# Patient Record
Sex: MALE | NOT HISPANIC OR LATINO | URBAN - METROPOLITAN AREA
[De-identification: names, ages, dates, MRNs, and addresses within clinical notes are randomized per-mention and may not be internally consistent; named-entity substitution may affect disease eponyms.]

---

## 2021-01-01 ENCOUNTER — INPATIENT (INPATIENT)
Facility: HOSPITAL | Age: 86
LOS: 13 days | DRG: 870 | End: 2021-12-15
Attending: INTERNAL MEDICINE | Admitting: INTERNAL MEDICINE
Payer: MEDICARE

## 2021-01-01 VITALS
DIASTOLIC BLOOD PRESSURE: 82 MMHG | SYSTOLIC BLOOD PRESSURE: 169 MMHG | RESPIRATION RATE: 26 BRPM | TEMPERATURE: 95 F | WEIGHT: 186.51 LBS | HEART RATE: 140 BPM | OXYGEN SATURATION: 83 %

## 2021-01-01 VITALS
HEART RATE: 98 BPM | RESPIRATION RATE: 17 BRPM | OXYGEN SATURATION: 76 % | DIASTOLIC BLOOD PRESSURE: 38 MMHG | SYSTOLIC BLOOD PRESSURE: 61 MMHG

## 2021-01-01 DIAGNOSIS — U07.1 COVID-19: ICD-10-CM

## 2021-01-01 DIAGNOSIS — N17.9 ACUTE KIDNEY FAILURE, UNSPECIFIED: ICD-10-CM

## 2021-01-01 DIAGNOSIS — Z71.89 OTHER SPECIFIED COUNSELING: ICD-10-CM

## 2021-01-01 DIAGNOSIS — J96.01 ACUTE RESPIRATORY FAILURE WITH HYPOXIA: ICD-10-CM

## 2021-01-01 DIAGNOSIS — I96 GANGRENE, NOT ELSEWHERE CLASSIFIED: ICD-10-CM

## 2021-01-01 DIAGNOSIS — R06.00 DYSPNEA, UNSPECIFIED: ICD-10-CM

## 2021-01-01 DIAGNOSIS — R53.2 FUNCTIONAL QUADRIPLEGIA: ICD-10-CM

## 2021-01-01 DIAGNOSIS — Z51.5 ENCOUNTER FOR PALLIATIVE CARE: ICD-10-CM

## 2021-01-01 LAB
A1C WITH ESTIMATED AVERAGE GLUCOSE RESULT: 5.8 % — HIGH (ref 4–5.6)
ALBUMIN SERPL ELPH-MCNC: 2.3 G/DL — LOW (ref 3.3–5)
ALBUMIN SERPL ELPH-MCNC: 2.4 G/DL — LOW (ref 3.3–5)
ALBUMIN SERPL ELPH-MCNC: 2.4 G/DL — LOW (ref 3.3–5)
ALBUMIN SERPL ELPH-MCNC: 2.5 G/DL — LOW (ref 3.3–5)
ALBUMIN SERPL ELPH-MCNC: 2.6 G/DL — LOW (ref 3.3–5)
ALBUMIN SERPL ELPH-MCNC: 2.7 G/DL — LOW (ref 3.3–5)
ALBUMIN SERPL ELPH-MCNC: 2.8 G/DL — LOW (ref 3.3–5)
ALBUMIN SERPL ELPH-MCNC: 2.9 G/DL — LOW (ref 3.3–5)
ALBUMIN SERPL ELPH-MCNC: 3.2 G/DL — LOW (ref 3.3–5)
ALP SERPL-CCNC: 100 U/L — SIGNIFICANT CHANGE UP (ref 40–120)
ALP SERPL-CCNC: 101 U/L — SIGNIFICANT CHANGE UP (ref 40–120)
ALP SERPL-CCNC: 109 U/L — SIGNIFICANT CHANGE UP (ref 40–120)
ALP SERPL-CCNC: 111 U/L — SIGNIFICANT CHANGE UP (ref 40–120)
ALP SERPL-CCNC: 115 U/L — SIGNIFICANT CHANGE UP (ref 40–120)
ALP SERPL-CCNC: 124 U/L — HIGH (ref 40–120)
ALP SERPL-CCNC: 127 U/L — HIGH (ref 40–120)
ALP SERPL-CCNC: 130 U/L — HIGH (ref 40–120)
ALP SERPL-CCNC: 157 U/L — HIGH (ref 40–120)
ALP SERPL-CCNC: 88 U/L — SIGNIFICANT CHANGE UP (ref 40–120)
ALP SERPL-CCNC: 93 U/L — SIGNIFICANT CHANGE UP (ref 40–120)
ALP SERPL-CCNC: 95 U/L — SIGNIFICANT CHANGE UP (ref 40–120)
ALP SERPL-CCNC: 96 U/L — SIGNIFICANT CHANGE UP (ref 40–120)
ALP SERPL-CCNC: 98 U/L — SIGNIFICANT CHANGE UP (ref 40–120)
ALT FLD-CCNC: 105 U/L — HIGH (ref 10–45)
ALT FLD-CCNC: 118 U/L — HIGH (ref 10–45)
ALT FLD-CCNC: 120 U/L — HIGH (ref 10–45)
ALT FLD-CCNC: 141 U/L — HIGH (ref 10–45)
ALT FLD-CCNC: 151 U/L — HIGH (ref 10–45)
ALT FLD-CCNC: 188 U/L — HIGH (ref 10–45)
ALT FLD-CCNC: 211 U/L — HIGH (ref 10–45)
ALT FLD-CCNC: 28 U/L — SIGNIFICANT CHANGE UP (ref 10–45)
ALT FLD-CCNC: 295 U/L — HIGH (ref 10–45)
ALT FLD-CCNC: 380 U/L — HIGH (ref 10–45)
ALT FLD-CCNC: 385 U/L — HIGH (ref 10–45)
ALT FLD-CCNC: 395 U/L — HIGH (ref 10–45)
ALT FLD-CCNC: 489 U/L — HIGH (ref 10–45)
ALT FLD-CCNC: 95 U/L — HIGH (ref 10–45)
ANION GAP SERPL CALC-SCNC: 10 MMOL/L — SIGNIFICANT CHANGE UP (ref 5–17)
ANION GAP SERPL CALC-SCNC: 13 MMOL/L — SIGNIFICANT CHANGE UP (ref 5–17)
ANION GAP SERPL CALC-SCNC: 14 MMOL/L — SIGNIFICANT CHANGE UP (ref 5–17)
ANION GAP SERPL CALC-SCNC: 14 MMOL/L — SIGNIFICANT CHANGE UP (ref 5–17)
ANION GAP SERPL CALC-SCNC: 15 MMOL/L — SIGNIFICANT CHANGE UP (ref 5–17)
ANION GAP SERPL CALC-SCNC: 15 MMOL/L — SIGNIFICANT CHANGE UP (ref 5–17)
ANION GAP SERPL CALC-SCNC: 23 MMOL/L — HIGH (ref 5–17)
ANION GAP SERPL CALC-SCNC: 6 MMOL/L — SIGNIFICANT CHANGE UP (ref 5–17)
ANION GAP SERPL CALC-SCNC: 7 MMOL/L — SIGNIFICANT CHANGE UP (ref 5–17)
ANION GAP SERPL CALC-SCNC: 7 MMOL/L — SIGNIFICANT CHANGE UP (ref 5–17)
ANION GAP SERPL CALC-SCNC: 8 MMOL/L — SIGNIFICANT CHANGE UP (ref 5–17)
ANION GAP SERPL CALC-SCNC: 9 MMOL/L — SIGNIFICANT CHANGE UP (ref 5–17)
ANISOCYTOSIS BLD QL: SLIGHT — SIGNIFICANT CHANGE UP
APAP SERPL-MCNC: <5 UG/ML — LOW (ref 10–30)
APPEARANCE UR: CLEAR — SIGNIFICANT CHANGE UP
APPEARANCE UR: CLEAR — SIGNIFICANT CHANGE UP
APTT BLD: 110 SEC — HIGH (ref 27.5–35.5)
APTT BLD: 25.3 SEC — LOW (ref 27.5–35.5)
APTT BLD: 28 SEC — SIGNIFICANT CHANGE UP (ref 27.5–35.5)
APTT BLD: 62 SEC — HIGH (ref 27.5–35.5)
APTT BLD: 79.1 SEC — HIGH (ref 27.5–35.5)
APTT BLD: 84.2 SEC — HIGH (ref 27.5–35.5)
APTT BLD: 85.2 SEC — HIGH (ref 27.5–35.5)
APTT BLD: 88.5 SEC — HIGH (ref 27.5–35.5)
AST SERPL-CCNC: 103 U/L — HIGH (ref 10–40)
AST SERPL-CCNC: 107 U/L — HIGH (ref 10–40)
AST SERPL-CCNC: 124 U/L — HIGH (ref 10–40)
AST SERPL-CCNC: 125 U/L — HIGH (ref 10–40)
AST SERPL-CCNC: 128 U/L — HIGH (ref 10–40)
AST SERPL-CCNC: 174 U/L — HIGH (ref 10–40)
AST SERPL-CCNC: 180 U/L — HIGH (ref 10–40)
AST SERPL-CCNC: 220 U/L — HIGH (ref 10–40)
AST SERPL-CCNC: 553 U/L — HIGH (ref 10–40)
AST SERPL-CCNC: 769 U/L — HIGH (ref 10–40)
AST SERPL-CCNC: 85 U/L — HIGH (ref 10–40)
AST SERPL-CCNC: 89 U/L — HIGH (ref 10–40)
AST SERPL-CCNC: 93 U/L — HIGH (ref 10–40)
AST SERPL-CCNC: 94 U/L — HIGH (ref 10–40)
B-OH-BUTYR SERPL-SCNC: 0.3 MMOL/L — SIGNIFICANT CHANGE UP
BACTERIA # UR AUTO: ABNORMAL /HPF
BACTERIA # UR AUTO: PRESENT /HPF
BASE EXCESS BLDA CALC-SCNC: -13.6 MMOL/L — LOW (ref -2–3)
BASE EXCESS BLDA CALC-SCNC: -2.7 MMOL/L — LOW (ref -2–3)
BASE EXCESS BLDA CALC-SCNC: -3.7 MMOL/L — LOW (ref -2–3)
BASE EXCESS BLDA CALC-SCNC: -5.7 MMOL/L — LOW (ref -2–3)
BASE EXCESS BLDA CALC-SCNC: -6.7 MMOL/L — LOW (ref -2–3)
BASE EXCESS BLDA CALC-SCNC: -7.6 MMOL/L — LOW (ref -2–3)
BASE EXCESS BLDA CALC-SCNC: -7.9 MMOL/L — LOW (ref -2–3)
BASE EXCESS BLDA CALC-SCNC: -8 MMOL/L — LOW (ref -2–3)
BASE EXCESS BLDA CALC-SCNC: -8.1 MMOL/L — LOW (ref -2–3)
BASE EXCESS BLDA CALC-SCNC: -8.2 MMOL/L — LOW (ref -2–3)
BASE EXCESS BLDA CALC-SCNC: -8.3 MMOL/L — LOW (ref -2–3)
BASE EXCESS BLDA CALC-SCNC: -8.4 MMOL/L — LOW (ref -2–3)
BASE EXCESS BLDA CALC-SCNC: -8.6 MMOL/L — LOW (ref -2–3)
BASE EXCESS BLDA CALC-SCNC: -8.7 MMOL/L — LOW (ref -2–3)
BASE EXCESS BLDA CALC-SCNC: 3 MMOL/L — SIGNIFICANT CHANGE UP (ref -2–3)
BASE EXCESS BLDA CALC-SCNC: 5.9 MMOL/L — HIGH (ref -2–3)
BASE EXCESS BLDV CALC-SCNC: -7.4 MMOL/L — LOW (ref -2–3)
BASOPHILS # BLD AUTO: 0 K/UL — SIGNIFICANT CHANGE UP (ref 0–0.2)
BASOPHILS # BLD AUTO: 0.01 K/UL — SIGNIFICANT CHANGE UP (ref 0–0.2)
BASOPHILS # BLD AUTO: 0.02 K/UL — SIGNIFICANT CHANGE UP (ref 0–0.2)
BASOPHILS # BLD AUTO: 0.03 K/UL — SIGNIFICANT CHANGE UP (ref 0–0.2)
BASOPHILS # BLD AUTO: 0.03 K/UL — SIGNIFICANT CHANGE UP (ref 0–0.2)
BASOPHILS # BLD AUTO: 0.07 K/UL — SIGNIFICANT CHANGE UP (ref 0–0.2)
BASOPHILS # BLD AUTO: 0.07 K/UL — SIGNIFICANT CHANGE UP (ref 0–0.2)
BASOPHILS # BLD AUTO: 0.15 K/UL — SIGNIFICANT CHANGE UP (ref 0–0.2)
BASOPHILS NFR BLD AUTO: 0 % — SIGNIFICANT CHANGE UP (ref 0–2)
BASOPHILS NFR BLD AUTO: 0.1 % — SIGNIFICANT CHANGE UP (ref 0–2)
BASOPHILS NFR BLD AUTO: 0.2 % — SIGNIFICANT CHANGE UP (ref 0–2)
BASOPHILS NFR BLD AUTO: 0.2 % — SIGNIFICANT CHANGE UP (ref 0–2)
BASOPHILS NFR BLD AUTO: 0.3 % — SIGNIFICANT CHANGE UP (ref 0–2)
BASOPHILS NFR BLD AUTO: 0.4 % — SIGNIFICANT CHANGE UP (ref 0–2)
BASOPHILS NFR BLD AUTO: 0.5 % — SIGNIFICANT CHANGE UP (ref 0–2)
BASOPHILS NFR BLD AUTO: 0.9 % — SIGNIFICANT CHANGE UP (ref 0–2)
BILIRUB DIRECT SERPL-MCNC: 1.5 MG/DL — HIGH (ref 0–0.3)
BILIRUB INDIRECT FLD-MCNC: 0.2 MG/DL — SIGNIFICANT CHANGE UP (ref 0.2–1)
BILIRUB SERPL-MCNC: 0.4 MG/DL — SIGNIFICANT CHANGE UP (ref 0.2–1.2)
BILIRUB SERPL-MCNC: 0.4 MG/DL — SIGNIFICANT CHANGE UP (ref 0.2–1.2)
BILIRUB SERPL-MCNC: 0.5 MG/DL — SIGNIFICANT CHANGE UP (ref 0.2–1.2)
BILIRUB SERPL-MCNC: 0.6 MG/DL — SIGNIFICANT CHANGE UP (ref 0.2–1.2)
BILIRUB SERPL-MCNC: 0.7 MG/DL — SIGNIFICANT CHANGE UP (ref 0.2–1.2)
BILIRUB SERPL-MCNC: 0.8 MG/DL — SIGNIFICANT CHANGE UP (ref 0.2–1.2)
BILIRUB SERPL-MCNC: 0.9 MG/DL — SIGNIFICANT CHANGE UP (ref 0.2–1.2)
BILIRUB SERPL-MCNC: 1 MG/DL — SIGNIFICANT CHANGE UP (ref 0.2–1.2)
BILIRUB SERPL-MCNC: 1.7 MG/DL — HIGH (ref 0.2–1.2)
BILIRUB UR-MCNC: NEGATIVE — SIGNIFICANT CHANGE UP
BILIRUB UR-MCNC: NEGATIVE — SIGNIFICANT CHANGE UP
BUN SERPL-MCNC: 17 MG/DL — SIGNIFICANT CHANGE UP (ref 7–23)
BUN SERPL-MCNC: 27 MG/DL — HIGH (ref 7–23)
BUN SERPL-MCNC: 35 MG/DL — HIGH (ref 7–23)
BUN SERPL-MCNC: 39 MG/DL — HIGH (ref 7–23)
BUN SERPL-MCNC: 39 MG/DL — HIGH (ref 7–23)
BUN SERPL-MCNC: 41 MG/DL — HIGH (ref 7–23)
BUN SERPL-MCNC: 42 MG/DL — HIGH (ref 7–23)
BUN SERPL-MCNC: 42 MG/DL — HIGH (ref 7–23)
BUN SERPL-MCNC: 44 MG/DL — HIGH (ref 7–23)
BUN SERPL-MCNC: 45 MG/DL — HIGH (ref 7–23)
BUN SERPL-MCNC: 49 MG/DL — HIGH (ref 7–23)
BUN SERPL-MCNC: 51 MG/DL — HIGH (ref 7–23)
BUN SERPL-MCNC: 52 MG/DL — HIGH (ref 7–23)
BUN SERPL-MCNC: 53 MG/DL — HIGH (ref 7–23)
BUN SERPL-MCNC: 55 MG/DL — HIGH (ref 7–23)
BUN SERPL-MCNC: 56 MG/DL — HIGH (ref 7–23)
BUN SERPL-MCNC: 56 MG/DL — HIGH (ref 7–23)
BUN SERPL-MCNC: 57 MG/DL — HIGH (ref 7–23)
BUN SERPL-MCNC: 57 MG/DL — HIGH (ref 7–23)
BUN SERPL-MCNC: 60 MG/DL — HIGH (ref 7–23)
BURR CELLS BLD QL SMEAR: PRESENT — SIGNIFICANT CHANGE UP
BURR CELLS BLD QL SMEAR: PRESENT — SIGNIFICANT CHANGE UP
CA-I SERPL-SCNC: 1.11 MMOL/L — LOW (ref 1.15–1.33)
CALCIUM SERPL-MCNC: 7.6 MG/DL — LOW (ref 8.4–10.5)
CALCIUM SERPL-MCNC: 7.6 MG/DL — LOW (ref 8.4–10.5)
CALCIUM SERPL-MCNC: 7.7 MG/DL — LOW (ref 8.4–10.5)
CALCIUM SERPL-MCNC: 7.7 MG/DL — LOW (ref 8.4–10.5)
CALCIUM SERPL-MCNC: 8 MG/DL — LOW (ref 8.4–10.5)
CALCIUM SERPL-MCNC: 8.1 MG/DL — LOW (ref 8.4–10.5)
CALCIUM SERPL-MCNC: 8.2 MG/DL — LOW (ref 8.4–10.5)
CALCIUM SERPL-MCNC: 8.3 MG/DL — LOW (ref 8.4–10.5)
CALCIUM SERPL-MCNC: 8.3 MG/DL — LOW (ref 8.4–10.5)
CALCIUM SERPL-MCNC: 8.4 MG/DL — SIGNIFICANT CHANGE UP (ref 8.4–10.5)
CALCIUM SERPL-MCNC: 8.4 MG/DL — SIGNIFICANT CHANGE UP (ref 8.4–10.5)
CALCIUM SERPL-MCNC: 8.5 MG/DL — SIGNIFICANT CHANGE UP (ref 8.4–10.5)
CALCIUM SERPL-MCNC: 8.6 MG/DL — SIGNIFICANT CHANGE UP (ref 8.4–10.5)
CALCIUM SERPL-MCNC: 8.8 MG/DL — SIGNIFICANT CHANGE UP (ref 8.4–10.5)
CALCIUM SERPL-MCNC: 8.8 MG/DL — SIGNIFICANT CHANGE UP (ref 8.4–10.5)
CALCIUM SERPL-MCNC: 9 MG/DL — SIGNIFICANT CHANGE UP (ref 8.4–10.5)
CHLORIDE SERPL-SCNC: 104 MMOL/L — SIGNIFICANT CHANGE UP (ref 96–108)
CHLORIDE SERPL-SCNC: 104 MMOL/L — SIGNIFICANT CHANGE UP (ref 96–108)
CHLORIDE SERPL-SCNC: 105 MMOL/L — SIGNIFICANT CHANGE UP (ref 96–108)
CHLORIDE SERPL-SCNC: 108 MMOL/L — SIGNIFICANT CHANGE UP (ref 96–108)
CHLORIDE SERPL-SCNC: 108 MMOL/L — SIGNIFICANT CHANGE UP (ref 96–108)
CHLORIDE SERPL-SCNC: 109 MMOL/L — HIGH (ref 96–108)
CHLORIDE SERPL-SCNC: 110 MMOL/L — HIGH (ref 96–108)
CHLORIDE SERPL-SCNC: 110 MMOL/L — HIGH (ref 96–108)
CHLORIDE SERPL-SCNC: 112 MMOL/L — HIGH (ref 96–108)
CHLORIDE SERPL-SCNC: 112 MMOL/L — HIGH (ref 96–108)
CHLORIDE SERPL-SCNC: 113 MMOL/L — HIGH (ref 96–108)
CHLORIDE SERPL-SCNC: 114 MMOL/L — HIGH (ref 96–108)
CHLORIDE SERPL-SCNC: 115 MMOL/L — HIGH (ref 96–108)
CHLORIDE SERPL-SCNC: 118 MMOL/L — HIGH (ref 96–108)
CHLORIDE SERPL-SCNC: 119 MMOL/L — HIGH (ref 96–108)
CHLORIDE SERPL-SCNC: 96 MMOL/L — SIGNIFICANT CHANGE UP (ref 96–108)
CK MB CFR SERPL CALC: 5.9 NG/ML — SIGNIFICANT CHANGE UP (ref 0–6.7)
CK MB CFR SERPL CALC: 7 NG/ML — HIGH (ref 0–6.7)
CK SERPL-CCNC: 1018 U/L — HIGH (ref 30–200)
CK SERPL-CCNC: 1227 U/L — HIGH (ref 30–200)
CO2 BLDA-SCNC: 13 MMOL/L — LOW (ref 19–24)
CO2 BLDA-SCNC: 20 MMOL/L — SIGNIFICANT CHANGE UP (ref 19–24)
CO2 BLDA-SCNC: 21 MMOL/L — SIGNIFICANT CHANGE UP (ref 19–24)
CO2 BLDA-SCNC: 21 MMOL/L — SIGNIFICANT CHANGE UP (ref 19–24)
CO2 BLDA-SCNC: 22 MMOL/L — SIGNIFICANT CHANGE UP (ref 19–24)
CO2 BLDA-SCNC: 24 MMOL/L — SIGNIFICANT CHANGE UP (ref 19–24)
CO2 BLDA-SCNC: 24 MMOL/L — SIGNIFICANT CHANGE UP (ref 19–24)
CO2 BLDA-SCNC: 28 MMOL/L — HIGH (ref 19–24)
CO2 BLDA-SCNC: 29 MMOL/L — HIGH (ref 19–24)
CO2 BLDV-SCNC: 22.4 MMOL/L — SIGNIFICANT CHANGE UP (ref 22–26)
CO2 SERPL-SCNC: 18 MMOL/L — LOW (ref 22–31)
CO2 SERPL-SCNC: 19 MMOL/L — LOW (ref 22–31)
CO2 SERPL-SCNC: 21 MMOL/L — LOW (ref 22–31)
CO2 SERPL-SCNC: 23 MMOL/L — SIGNIFICANT CHANGE UP (ref 22–31)
CO2 SERPL-SCNC: 24 MMOL/L — SIGNIFICANT CHANGE UP (ref 22–31)
CO2 SERPL-SCNC: 25 MMOL/L — SIGNIFICANT CHANGE UP (ref 22–31)
CO2 SERPL-SCNC: 26 MMOL/L — SIGNIFICANT CHANGE UP (ref 22–31)
CO2 SERPL-SCNC: 27 MMOL/L — SIGNIFICANT CHANGE UP (ref 22–31)
CO2 SERPL-SCNC: 28 MMOL/L — SIGNIFICANT CHANGE UP (ref 22–31)
COLOR SPEC: YELLOW — SIGNIFICANT CHANGE UP
COLOR SPEC: YELLOW — SIGNIFICANT CHANGE UP
COVID-19 NUCLEOCAPSID GAM AB INTERP: POSITIVE
COVID-19 NUCLEOCAPSID TOTAL GAM ANTIBODY RESULT: 31.9 INDEX — HIGH
COVID-19 SPIKE DOMAIN AB INTERP: POSITIVE
COVID-19 SPIKE DOMAIN ANTIBODY RESULT: >250 U/ML — HIGH
CREAT SERPL-MCNC: 0.93 MG/DL — SIGNIFICANT CHANGE UP (ref 0.5–1.3)
CREAT SERPL-MCNC: 0.99 MG/DL — SIGNIFICANT CHANGE UP (ref 0.5–1.3)
CREAT SERPL-MCNC: 0.99 MG/DL — SIGNIFICANT CHANGE UP (ref 0.5–1.3)
CREAT SERPL-MCNC: 1.01 MG/DL — SIGNIFICANT CHANGE UP (ref 0.5–1.3)
CREAT SERPL-MCNC: 1.04 MG/DL — SIGNIFICANT CHANGE UP (ref 0.5–1.3)
CREAT SERPL-MCNC: 1.05 MG/DL — SIGNIFICANT CHANGE UP (ref 0.5–1.3)
CREAT SERPL-MCNC: 1.06 MG/DL — SIGNIFICANT CHANGE UP (ref 0.5–1.3)
CREAT SERPL-MCNC: 1.07 MG/DL — SIGNIFICANT CHANGE UP (ref 0.5–1.3)
CREAT SERPL-MCNC: 1.09 MG/DL — SIGNIFICANT CHANGE UP (ref 0.5–1.3)
CREAT SERPL-MCNC: 1.1 MG/DL — SIGNIFICANT CHANGE UP (ref 0.5–1.3)
CREAT SERPL-MCNC: 1.12 MG/DL — SIGNIFICANT CHANGE UP (ref 0.5–1.3)
CREAT SERPL-MCNC: 1.13 MG/DL — SIGNIFICANT CHANGE UP (ref 0.5–1.3)
CREAT SERPL-MCNC: 1.14 MG/DL — SIGNIFICANT CHANGE UP (ref 0.5–1.3)
CREAT SERPL-MCNC: 1.32 MG/DL — HIGH (ref 0.5–1.3)
CREAT SERPL-MCNC: 1.57 MG/DL — HIGH (ref 0.5–1.3)
CREAT SERPL-MCNC: 1.6 MG/DL — HIGH (ref 0.5–1.3)
CREAT SERPL-MCNC: 1.77 MG/DL — HIGH (ref 0.5–1.3)
CREAT SERPL-MCNC: 2.05 MG/DL — HIGH (ref 0.5–1.3)
CREAT SERPL-MCNC: 2.25 MG/DL — HIGH (ref 0.5–1.3)
CREAT SERPL-MCNC: 2.34 MG/DL — HIGH (ref 0.5–1.3)
CULTURE RESULTS: NO GROWTH — SIGNIFICANT CHANGE UP
CULTURE RESULTS: SIGNIFICANT CHANGE UP
DACRYOCYTES BLD QL SMEAR: SLIGHT — SIGNIFICANT CHANGE UP
DACRYOCYTES BLD QL SMEAR: SLIGHT — SIGNIFICANT CHANGE UP
DIFF PNL FLD: ABNORMAL
DIFF PNL FLD: ABNORMAL
EOSINOPHIL # BLD AUTO: 0 K/UL — SIGNIFICANT CHANGE UP (ref 0–0.5)
EOSINOPHIL # BLD AUTO: 0.03 K/UL — SIGNIFICANT CHANGE UP (ref 0–0.5)
EOSINOPHIL # BLD AUTO: 0.17 K/UL — SIGNIFICANT CHANGE UP (ref 0–0.5)
EOSINOPHIL # BLD AUTO: 0.58 K/UL — HIGH (ref 0–0.5)
EOSINOPHIL # BLD AUTO: 0.58 K/UL — HIGH (ref 0–0.5)
EOSINOPHIL # BLD AUTO: 0.6 K/UL — HIGH (ref 0–0.5)
EOSINOPHIL NFR BLD AUTO: 0 % — SIGNIFICANT CHANGE UP (ref 0–6)
EOSINOPHIL NFR BLD AUTO: 0.2 % — SIGNIFICANT CHANGE UP (ref 0–6)
EOSINOPHIL NFR BLD AUTO: 1.3 % — SIGNIFICANT CHANGE UP (ref 0–6)
EOSINOPHIL NFR BLD AUTO: 3.7 % — SIGNIFICANT CHANGE UP (ref 0–6)
EOSINOPHIL NFR BLD AUTO: 4.2 % — SIGNIFICANT CHANGE UP (ref 0–6)
EOSINOPHIL NFR BLD AUTO: 4.3 % — SIGNIFICANT CHANGE UP (ref 0–6)
EPI CELLS # UR: SIGNIFICANT CHANGE UP /HPF (ref 0–5)
EPI CELLS # UR: SIGNIFICANT CHANGE UP /HPF (ref 0–5)
ESTIMATED AVERAGE GLUCOSE: 120 MG/DL — HIGH (ref 68–114)
ETHANOL SERPL-MCNC: <10 MG/DL — SIGNIFICANT CHANGE UP (ref 0–10)
FUNGITELL: <31 PG/ML — SIGNIFICANT CHANGE UP
GAS PNL BLDA: SIGNIFICANT CHANGE UP
GAS PNL BLDV: 131 MMOL/L — LOW (ref 136–145)
GAS PNL BLDV: SIGNIFICANT CHANGE UP
GIANT PLATELETS BLD QL SMEAR: PRESENT — SIGNIFICANT CHANGE UP
GLUCOSE BLDC GLUCOMTR-MCNC: 103 MG/DL — HIGH (ref 70–99)
GLUCOSE BLDC GLUCOMTR-MCNC: 109 MG/DL — HIGH (ref 70–99)
GLUCOSE BLDC GLUCOMTR-MCNC: 109 MG/DL — HIGH (ref 70–99)
GLUCOSE BLDC GLUCOMTR-MCNC: 110 MG/DL — HIGH (ref 70–99)
GLUCOSE BLDC GLUCOMTR-MCNC: 113 MG/DL — HIGH (ref 70–99)
GLUCOSE BLDC GLUCOMTR-MCNC: 117 MG/DL — HIGH (ref 70–99)
GLUCOSE BLDC GLUCOMTR-MCNC: 119 MG/DL — HIGH (ref 70–99)
GLUCOSE BLDC GLUCOMTR-MCNC: 123 MG/DL — HIGH (ref 70–99)
GLUCOSE BLDC GLUCOMTR-MCNC: 123 MG/DL — HIGH (ref 70–99)
GLUCOSE BLDC GLUCOMTR-MCNC: 125 MG/DL — HIGH (ref 70–99)
GLUCOSE BLDC GLUCOMTR-MCNC: 125 MG/DL — HIGH (ref 70–99)
GLUCOSE BLDC GLUCOMTR-MCNC: 128 MG/DL — HIGH (ref 70–99)
GLUCOSE BLDC GLUCOMTR-MCNC: 129 MG/DL — HIGH (ref 70–99)
GLUCOSE BLDC GLUCOMTR-MCNC: 129 MG/DL — HIGH (ref 70–99)
GLUCOSE BLDC GLUCOMTR-MCNC: 130 MG/DL — HIGH (ref 70–99)
GLUCOSE BLDC GLUCOMTR-MCNC: 132 MG/DL — HIGH (ref 70–99)
GLUCOSE BLDC GLUCOMTR-MCNC: 133 MG/DL — HIGH (ref 70–99)
GLUCOSE BLDC GLUCOMTR-MCNC: 135 MG/DL — HIGH (ref 70–99)
GLUCOSE BLDC GLUCOMTR-MCNC: 137 MG/DL — HIGH (ref 70–99)
GLUCOSE BLDC GLUCOMTR-MCNC: 139 MG/DL — HIGH (ref 70–99)
GLUCOSE BLDC GLUCOMTR-MCNC: 142 MG/DL — HIGH (ref 70–99)
GLUCOSE BLDC GLUCOMTR-MCNC: 145 MG/DL — HIGH (ref 70–99)
GLUCOSE BLDC GLUCOMTR-MCNC: 145 MG/DL — HIGH (ref 70–99)
GLUCOSE BLDC GLUCOMTR-MCNC: 149 MG/DL — HIGH (ref 70–99)
GLUCOSE BLDC GLUCOMTR-MCNC: 153 MG/DL — HIGH (ref 70–99)
GLUCOSE BLDC GLUCOMTR-MCNC: 156 MG/DL — HIGH (ref 70–99)
GLUCOSE BLDC GLUCOMTR-MCNC: 156 MG/DL — HIGH (ref 70–99)
GLUCOSE BLDC GLUCOMTR-MCNC: 158 MG/DL — HIGH (ref 70–99)
GLUCOSE BLDC GLUCOMTR-MCNC: 162 MG/DL — HIGH (ref 70–99)
GLUCOSE BLDC GLUCOMTR-MCNC: 164 MG/DL — HIGH (ref 70–99)
GLUCOSE BLDC GLUCOMTR-MCNC: 164 MG/DL — HIGH (ref 70–99)
GLUCOSE BLDC GLUCOMTR-MCNC: 169 MG/DL — HIGH (ref 70–99)
GLUCOSE BLDC GLUCOMTR-MCNC: 177 MG/DL — HIGH (ref 70–99)
GLUCOSE BLDC GLUCOMTR-MCNC: 182 MG/DL — HIGH (ref 70–99)
GLUCOSE BLDC GLUCOMTR-MCNC: 190 MG/DL — HIGH (ref 70–99)
GLUCOSE BLDC GLUCOMTR-MCNC: 192 MG/DL — HIGH (ref 70–99)
GLUCOSE BLDC GLUCOMTR-MCNC: 193 MG/DL — HIGH (ref 70–99)
GLUCOSE BLDC GLUCOMTR-MCNC: 198 MG/DL — HIGH (ref 70–99)
GLUCOSE BLDC GLUCOMTR-MCNC: 198 MG/DL — HIGH (ref 70–99)
GLUCOSE BLDC GLUCOMTR-MCNC: 206 MG/DL — HIGH (ref 70–99)
GLUCOSE BLDC GLUCOMTR-MCNC: 49 MG/DL — CRITICAL LOW (ref 70–99)
GLUCOSE BLDC GLUCOMTR-MCNC: 89 MG/DL — SIGNIFICANT CHANGE UP (ref 70–99)
GLUCOSE BLDC GLUCOMTR-MCNC: 89 MG/DL — SIGNIFICANT CHANGE UP (ref 70–99)
GLUCOSE BLDC GLUCOMTR-MCNC: 95 MG/DL — SIGNIFICANT CHANGE UP (ref 70–99)
GLUCOSE BLDC GLUCOMTR-MCNC: 96 MG/DL — SIGNIFICANT CHANGE UP (ref 70–99)
GLUCOSE BLDC GLUCOMTR-MCNC: 96 MG/DL — SIGNIFICANT CHANGE UP (ref 70–99)
GLUCOSE BLDC GLUCOMTR-MCNC: 97 MG/DL — SIGNIFICANT CHANGE UP (ref 70–99)
GLUCOSE SERPL-MCNC: 101 MG/DL — HIGH (ref 70–99)
GLUCOSE SERPL-MCNC: 106 MG/DL — HIGH (ref 70–99)
GLUCOSE SERPL-MCNC: 115 MG/DL — HIGH (ref 70–99)
GLUCOSE SERPL-MCNC: 116 MG/DL — HIGH (ref 70–99)
GLUCOSE SERPL-MCNC: 124 MG/DL — HIGH (ref 70–99)
GLUCOSE SERPL-MCNC: 128 MG/DL — HIGH (ref 70–99)
GLUCOSE SERPL-MCNC: 129 MG/DL — HIGH (ref 70–99)
GLUCOSE SERPL-MCNC: 139 MG/DL — HIGH (ref 70–99)
GLUCOSE SERPL-MCNC: 147 MG/DL — HIGH (ref 70–99)
GLUCOSE SERPL-MCNC: 147 MG/DL — HIGH (ref 70–99)
GLUCOSE SERPL-MCNC: 156 MG/DL — HIGH (ref 70–99)
GLUCOSE SERPL-MCNC: 163 MG/DL — HIGH (ref 70–99)
GLUCOSE SERPL-MCNC: 166 MG/DL — HIGH (ref 70–99)
GLUCOSE SERPL-MCNC: 169 MG/DL — HIGH (ref 70–99)
GLUCOSE SERPL-MCNC: 181 MG/DL — HIGH (ref 70–99)
GLUCOSE SERPL-MCNC: 190 MG/DL — HIGH (ref 70–99)
GLUCOSE SERPL-MCNC: 243 MG/DL — HIGH (ref 70–99)
GLUCOSE UR QL: NEGATIVE — SIGNIFICANT CHANGE UP
GLUCOSE UR QL: NEGATIVE — SIGNIFICANT CHANGE UP
GRAM STN FLD: SIGNIFICANT CHANGE UP
GRAM STN FLD: SIGNIFICANT CHANGE UP
HCO3 BLDA-SCNC: 12 MMOL/L — LOW (ref 21–28)
HCO3 BLDA-SCNC: 18 MMOL/L — LOW (ref 21–28)
HCO3 BLDA-SCNC: 19 MMOL/L — LOW (ref 21–28)
HCO3 BLDA-SCNC: 20 MMOL/L — LOW (ref 21–28)
HCO3 BLDA-SCNC: 21 MMOL/L — SIGNIFICANT CHANGE UP (ref 21–28)
HCO3 BLDA-SCNC: 21 MMOL/L — SIGNIFICANT CHANGE UP (ref 21–28)
HCO3 BLDA-SCNC: 23 MMOL/L — SIGNIFICANT CHANGE UP (ref 21–28)
HCO3 BLDA-SCNC: 23 MMOL/L — SIGNIFICANT CHANGE UP (ref 21–28)
HCO3 BLDA-SCNC: 27 MMOL/L — SIGNIFICANT CHANGE UP (ref 21–28)
HCO3 BLDA-SCNC: 28 MMOL/L — SIGNIFICANT CHANGE UP (ref 21–28)
HCO3 BLDV-SCNC: 21 MMOL/L — LOW (ref 22–29)
HCT VFR BLD CALC: 33.5 % — LOW (ref 39–50)
HCT VFR BLD CALC: 34 % — LOW (ref 39–50)
HCT VFR BLD CALC: 34.7 % — LOW (ref 39–50)
HCT VFR BLD CALC: 34.8 % — LOW (ref 39–50)
HCT VFR BLD CALC: 35.3 % — LOW (ref 39–50)
HCT VFR BLD CALC: 36.1 % — LOW (ref 39–50)
HCT VFR BLD CALC: 36.2 % — LOW (ref 39–50)
HCT VFR BLD CALC: 36.5 % — LOW (ref 39–50)
HCT VFR BLD CALC: 37.5 % — LOW (ref 39–50)
HCT VFR BLD CALC: 37.5 % — LOW (ref 39–50)
HCT VFR BLD CALC: 37.8 % — LOW (ref 39–50)
HCT VFR BLD CALC: 38.5 % — LOW (ref 39–50)
HCT VFR BLD CALC: 42 % — SIGNIFICANT CHANGE UP (ref 39–50)
HGB BLD-MCNC: 10.7 G/DL — LOW (ref 13–17)
HGB BLD-MCNC: 10.8 G/DL — LOW (ref 13–17)
HGB BLD-MCNC: 11.1 G/DL — LOW (ref 13–17)
HGB BLD-MCNC: 11.4 G/DL — LOW (ref 13–17)
HGB BLD-MCNC: 11.5 G/DL — LOW (ref 13–17)
HGB BLD-MCNC: 11.5 G/DL — LOW (ref 13–17)
HGB BLD-MCNC: 11.8 G/DL — LOW (ref 13–17)
HGB BLD-MCNC: 11.9 G/DL — LOW (ref 13–17)
HGB BLD-MCNC: 12 G/DL — LOW (ref 13–17)
HGB BLD-MCNC: 12.1 G/DL — LOW (ref 13–17)
HGB BLD-MCNC: 13.8 G/DL — SIGNIFICANT CHANGE UP (ref 13–17)
HYPOCHROMIA BLD QL: SLIGHT — SIGNIFICANT CHANGE UP
IMM GRANULOCYTES NFR BLD AUTO: 1.2 % — SIGNIFICANT CHANGE UP (ref 0–1.5)
IMM GRANULOCYTES NFR BLD AUTO: 1.6 % — HIGH (ref 0–1.5)
IMM GRANULOCYTES NFR BLD AUTO: 1.8 % — HIGH (ref 0–1.5)
IMM GRANULOCYTES NFR BLD AUTO: 1.9 % — HIGH (ref 0–1.5)
IMM GRANULOCYTES NFR BLD AUTO: 2.2 % — HIGH (ref 0–1.5)
IMM GRANULOCYTES NFR BLD AUTO: 3.8 % — HIGH (ref 0–1.5)
INR BLD: 1.2 — HIGH (ref 0.88–1.16)
INR BLD: 1.93 — HIGH (ref 0.88–1.16)
KETONES UR-MCNC: NEGATIVE — SIGNIFICANT CHANGE UP
KETONES UR-MCNC: NEGATIVE — SIGNIFICANT CHANGE UP
LACTATE SERPL-SCNC: 1.5 MMOL/L — SIGNIFICANT CHANGE UP (ref 0.5–2)
LACTATE SERPL-SCNC: 1.7 MMOL/L — SIGNIFICANT CHANGE UP (ref 0.5–2)
LEUKOCYTE ESTERASE UR-ACNC: NEGATIVE — SIGNIFICANT CHANGE UP
LEUKOCYTE ESTERASE UR-ACNC: NEGATIVE — SIGNIFICANT CHANGE UP
LIDOCAIN IGE QN: 18 U/L — SIGNIFICANT CHANGE UP (ref 7–60)
LYMPHOCYTES # BLD AUTO: 0.29 K/UL — LOW (ref 1–3.3)
LYMPHOCYTES # BLD AUTO: 0.39 K/UL — LOW (ref 1–3.3)
LYMPHOCYTES # BLD AUTO: 0.39 K/UL — LOW (ref 1–3.3)
LYMPHOCYTES # BLD AUTO: 0.42 K/UL — LOW (ref 1–3.3)
LYMPHOCYTES # BLD AUTO: 0.46 K/UL — LOW (ref 1–3.3)
LYMPHOCYTES # BLD AUTO: 0.5 K/UL — LOW (ref 1–3.3)
LYMPHOCYTES # BLD AUTO: 0.54 K/UL — LOW (ref 1–3.3)
LYMPHOCYTES # BLD AUTO: 0.55 K/UL — LOW (ref 1–3.3)
LYMPHOCYTES # BLD AUTO: 0.56 K/UL — LOW (ref 1–3.3)
LYMPHOCYTES # BLD AUTO: 0.64 K/UL — LOW (ref 1–3.3)
LYMPHOCYTES # BLD AUTO: 0.97 K/UL — LOW (ref 1–3.3)
LYMPHOCYTES # BLD AUTO: 1.34 K/UL — SIGNIFICANT CHANGE UP (ref 1–3.3)
LYMPHOCYTES # BLD AUTO: 1.8 % — LOW (ref 13–44)
LYMPHOCYTES # BLD AUTO: 2.6 % — LOW (ref 13–44)
LYMPHOCYTES # BLD AUTO: 3.8 % — LOW (ref 13–44)
LYMPHOCYTES # BLD AUTO: 4 % — LOW (ref 13–44)
LYMPHOCYTES # BLD AUTO: 4.1 % — LOW (ref 13–44)
LYMPHOCYTES # BLD AUTO: 4.2 % — LOW (ref 13–44)
LYMPHOCYTES # BLD AUTO: 4.4 % — LOW (ref 13–44)
LYMPHOCYTES # BLD AUTO: 4.4 % — LOW (ref 13–44)
LYMPHOCYTES # BLD AUTO: 4.8 % — LOW (ref 13–44)
LYMPHOCYTES # BLD AUTO: 4.9 % — LOW (ref 13–44)
LYMPHOCYTES # BLD AUTO: 7 % — LOW (ref 13–44)
LYMPHOCYTES # BLD AUTO: 7.4 % — LOW (ref 13–44)
MACROCYTES BLD QL: SLIGHT — SIGNIFICANT CHANGE UP
MAGNESIUM SERPL-MCNC: 1.7 MG/DL — SIGNIFICANT CHANGE UP (ref 1.6–2.6)
MAGNESIUM SERPL-MCNC: 1.8 MG/DL — SIGNIFICANT CHANGE UP (ref 1.6–2.6)
MAGNESIUM SERPL-MCNC: 2.1 MG/DL — SIGNIFICANT CHANGE UP (ref 1.6–2.6)
MAGNESIUM SERPL-MCNC: 2.1 MG/DL — SIGNIFICANT CHANGE UP (ref 1.6–2.6)
MAGNESIUM SERPL-MCNC: 2.3 MG/DL — SIGNIFICANT CHANGE UP (ref 1.6–2.6)
MAGNESIUM SERPL-MCNC: 2.3 MG/DL — SIGNIFICANT CHANGE UP (ref 1.6–2.6)
MAGNESIUM SERPL-MCNC: 2.4 MG/DL — SIGNIFICANT CHANGE UP (ref 1.6–2.6)
MAGNESIUM SERPL-MCNC: 2.4 MG/DL — SIGNIFICANT CHANGE UP (ref 1.6–2.6)
MAGNESIUM SERPL-MCNC: 2.5 MG/DL — SIGNIFICANT CHANGE UP (ref 1.6–2.6)
MAGNESIUM SERPL-MCNC: 2.6 MG/DL — SIGNIFICANT CHANGE UP (ref 1.6–2.6)
MAGNESIUM SERPL-MCNC: 2.8 MG/DL — HIGH (ref 1.6–2.6)
MAGNESIUM SERPL-MCNC: 2.9 MG/DL — HIGH (ref 1.6–2.6)
MANUAL SMEAR VERIFICATION: SIGNIFICANT CHANGE UP
MCHC RBC-ENTMCNC: 27.2 PG — SIGNIFICANT CHANGE UP (ref 27–34)
MCHC RBC-ENTMCNC: 27.3 PG — SIGNIFICANT CHANGE UP (ref 27–34)
MCHC RBC-ENTMCNC: 27.4 PG — SIGNIFICANT CHANGE UP (ref 27–34)
MCHC RBC-ENTMCNC: 27.5 PG — SIGNIFICANT CHANGE UP (ref 27–34)
MCHC RBC-ENTMCNC: 27.6 PG — SIGNIFICANT CHANGE UP (ref 27–34)
MCHC RBC-ENTMCNC: 27.6 PG — SIGNIFICANT CHANGE UP (ref 27–34)
MCHC RBC-ENTMCNC: 27.9 PG — SIGNIFICANT CHANGE UP (ref 27–34)
MCHC RBC-ENTMCNC: 28 PG — SIGNIFICANT CHANGE UP (ref 27–34)
MCHC RBC-ENTMCNC: 28.1 PG — SIGNIFICANT CHANGE UP (ref 27–34)
MCHC RBC-ENTMCNC: 28.2 PG — SIGNIFICANT CHANGE UP (ref 27–34)
MCHC RBC-ENTMCNC: 28.3 PG — SIGNIFICANT CHANGE UP (ref 27–34)
MCHC RBC-ENTMCNC: 28.6 PG — SIGNIFICANT CHANGE UP (ref 27–34)
MCHC RBC-ENTMCNC: 28.9 PG — SIGNIFICANT CHANGE UP (ref 27–34)
MCHC RBC-ENTMCNC: 30.7 GM/DL — LOW (ref 32–36)
MCHC RBC-ENTMCNC: 31.4 GM/DL — LOW (ref 32–36)
MCHC RBC-ENTMCNC: 31.5 GM/DL — LOW (ref 32–36)
MCHC RBC-ENTMCNC: 31.7 GM/DL — LOW (ref 32–36)
MCHC RBC-ENTMCNC: 31.7 GM/DL — LOW (ref 32–36)
MCHC RBC-ENTMCNC: 31.8 GM/DL — LOW (ref 32–36)
MCHC RBC-ENTMCNC: 32.2 GM/DL — SIGNIFICANT CHANGE UP (ref 32–36)
MCHC RBC-ENTMCNC: 32.3 GM/DL — SIGNIFICANT CHANGE UP (ref 32–36)
MCHC RBC-ENTMCNC: 32.6 GM/DL — SIGNIFICANT CHANGE UP (ref 32–36)
MCHC RBC-ENTMCNC: 32.9 GM/DL — SIGNIFICANT CHANGE UP (ref 32–36)
MCHC RBC-ENTMCNC: 32.9 GM/DL — SIGNIFICANT CHANGE UP (ref 32–36)
MCHC RBC-ENTMCNC: 33.1 GM/DL — SIGNIFICANT CHANGE UP (ref 32–36)
MCHC RBC-ENTMCNC: 33.2 GM/DL — SIGNIFICANT CHANGE UP (ref 32–36)
MCV RBC AUTO: 84.8 FL — SIGNIFICANT CHANGE UP (ref 80–100)
MCV RBC AUTO: 84.9 FL — SIGNIFICANT CHANGE UP (ref 80–100)
MCV RBC AUTO: 85 FL — SIGNIFICANT CHANGE UP (ref 80–100)
MCV RBC AUTO: 85.3 FL — SIGNIFICANT CHANGE UP (ref 80–100)
MCV RBC AUTO: 85.7 FL — SIGNIFICANT CHANGE UP (ref 80–100)
MCV RBC AUTO: 85.9 FL — SIGNIFICANT CHANGE UP (ref 80–100)
MCV RBC AUTO: 86.8 FL — SIGNIFICANT CHANGE UP (ref 80–100)
MCV RBC AUTO: 87.1 FL — SIGNIFICANT CHANGE UP (ref 80–100)
MCV RBC AUTO: 87.4 FL — SIGNIFICANT CHANGE UP (ref 80–100)
MCV RBC AUTO: 87.9 FL — SIGNIFICANT CHANGE UP (ref 80–100)
MCV RBC AUTO: 88.4 FL — SIGNIFICANT CHANGE UP (ref 80–100)
MCV RBC AUTO: 89.2 FL — SIGNIFICANT CHANGE UP (ref 80–100)
MCV RBC AUTO: 89.4 FL — SIGNIFICANT CHANGE UP (ref 80–100)
METAMYELOCYTES # FLD: 0.8 % — HIGH (ref 0–0)
METAMYELOCYTES # FLD: 0.9 % — HIGH (ref 0–0)
METAMYELOCYTES # FLD: 0.9 % — HIGH (ref 0–0)
MICROCYTES BLD QL: SLIGHT — SIGNIFICANT CHANGE UP
MONOCYTES # BLD AUTO: 0.08 K/UL — SIGNIFICANT CHANGE UP (ref 0–0.9)
MONOCYTES # BLD AUTO: 0.39 K/UL — SIGNIFICANT CHANGE UP (ref 0–0.9)
MONOCYTES # BLD AUTO: 0.44 K/UL — SIGNIFICANT CHANGE UP (ref 0–0.9)
MONOCYTES # BLD AUTO: 0.52 K/UL — SIGNIFICANT CHANGE UP (ref 0–0.9)
MONOCYTES # BLD AUTO: 0.56 K/UL — SIGNIFICANT CHANGE UP (ref 0–0.9)
MONOCYTES # BLD AUTO: 0.58 K/UL — SIGNIFICANT CHANGE UP (ref 0–0.9)
MONOCYTES # BLD AUTO: 0.64 K/UL — SIGNIFICANT CHANGE UP (ref 0–0.9)
MONOCYTES # BLD AUTO: 0.66 K/UL — SIGNIFICANT CHANGE UP (ref 0–0.9)
MONOCYTES # BLD AUTO: 0.77 K/UL — SIGNIFICANT CHANGE UP (ref 0–0.9)
MONOCYTES # BLD AUTO: 1.06 K/UL — HIGH (ref 0–0.9)
MONOCYTES # BLD AUTO: 1.29 K/UL — HIGH (ref 0–0.9)
MONOCYTES # BLD AUTO: 1.48 K/UL — HIGH (ref 0–0.9)
MONOCYTES NFR BLD AUTO: 0.9 % — LOW (ref 2–14)
MONOCYTES NFR BLD AUTO: 3.5 % — SIGNIFICANT CHANGE UP (ref 2–14)
MONOCYTES NFR BLD AUTO: 3.5 % — SIGNIFICANT CHANGE UP (ref 2–14)
MONOCYTES NFR BLD AUTO: 4.2 % — SIGNIFICANT CHANGE UP (ref 2–14)
MONOCYTES NFR BLD AUTO: 4.3 % — SIGNIFICANT CHANGE UP (ref 2–14)
MONOCYTES NFR BLD AUTO: 4.3 % — SIGNIFICANT CHANGE UP (ref 2–14)
MONOCYTES NFR BLD AUTO: 4.4 % — SIGNIFICANT CHANGE UP (ref 2–14)
MONOCYTES NFR BLD AUTO: 5.7 % — SIGNIFICANT CHANGE UP (ref 2–14)
MONOCYTES NFR BLD AUTO: 7 % — SIGNIFICANT CHANGE UP (ref 2–14)
MONOCYTES NFR BLD AUTO: 7.1 % — SIGNIFICANT CHANGE UP (ref 2–14)
MONOCYTES NFR BLD AUTO: 7.7 % — SIGNIFICANT CHANGE UP (ref 2–14)
MONOCYTES NFR BLD AUTO: 9.1 % — SIGNIFICANT CHANGE UP (ref 2–14)
MYELOCYTES NFR BLD: 0.9 % — HIGH (ref 0–0)
MYELOCYTES NFR BLD: 0.9 % — HIGH (ref 0–0)
MYELOCYTES NFR BLD: 1 % — HIGH (ref 0–0)
MYELOCYTES NFR BLD: 1.7 % — HIGH (ref 0–0)
MYELOCYTES NFR BLD: 1.8 % — HIGH (ref 0–0)
NEUTROPHILS # BLD AUTO: 10.18 K/UL — HIGH (ref 1.8–7.4)
NEUTROPHILS # BLD AUTO: 10.93 K/UL — HIGH (ref 1.8–7.4)
NEUTROPHILS # BLD AUTO: 11.43 K/UL — HIGH (ref 1.8–7.4)
NEUTROPHILS # BLD AUTO: 11.53 K/UL — HIGH (ref 1.8–7.4)
NEUTROPHILS # BLD AUTO: 11.68 K/UL — HIGH (ref 1.8–7.4)
NEUTROPHILS # BLD AUTO: 13.05 K/UL — HIGH (ref 1.8–7.4)
NEUTROPHILS # BLD AUTO: 13.15 K/UL — HIGH (ref 1.8–7.4)
NEUTROPHILS # BLD AUTO: 13.86 K/UL — HIGH (ref 1.8–7.4)
NEUTROPHILS # BLD AUTO: 15.17 K/UL — HIGH (ref 1.8–7.4)
NEUTROPHILS # BLD AUTO: 8.1 K/UL — HIGH (ref 1.8–7.4)
NEUTROPHILS # BLD AUTO: 8.26 K/UL — HIGH (ref 1.8–7.4)
NEUTROPHILS # BLD AUTO: 9.3 K/UL — HIGH (ref 1.8–7.4)
NEUTROPHILS NFR BLD AUTO: 79.4 % — HIGH (ref 43–77)
NEUTROPHILS NFR BLD AUTO: 80.9 % — HIGH (ref 43–77)
NEUTROPHILS NFR BLD AUTO: 83.3 % — HIGH (ref 43–77)
NEUTROPHILS NFR BLD AUTO: 84.2 % — HIGH (ref 43–77)
NEUTROPHILS NFR BLD AUTO: 85.2 % — HIGH (ref 43–77)
NEUTROPHILS NFR BLD AUTO: 87.8 % — HIGH (ref 43–77)
NEUTROPHILS NFR BLD AUTO: 88.2 % — HIGH (ref 43–77)
NEUTROPHILS NFR BLD AUTO: 88.6 % — HIGH (ref 43–77)
NEUTROPHILS NFR BLD AUTO: 89.4 % — HIGH (ref 43–77)
NEUTROPHILS NFR BLD AUTO: 91.2 % — HIGH (ref 43–77)
NEUTROPHILS NFR BLD AUTO: 92.1 % — HIGH (ref 43–77)
NEUTROPHILS NFR BLD AUTO: 93.3 % — HIGH (ref 43–77)
NEUTS BAND # BLD: 0.9 % — SIGNIFICANT CHANGE UP (ref 0–8)
NITRITE UR-MCNC: NEGATIVE — SIGNIFICANT CHANGE UP
NITRITE UR-MCNC: NEGATIVE — SIGNIFICANT CHANGE UP
NRBC # BLD: 0 /100 WBCS — SIGNIFICANT CHANGE UP (ref 0–0)
NRBC # BLD: 2 /100 WBCS — HIGH (ref 0–0)
NRBC # BLD: 2 /100 — HIGH (ref 0–0)
NRBC # BLD: 3 /100 WBCS — HIGH (ref 0–0)
NRBC # BLD: 3 /100 — HIGH (ref 0–0)
NRBC # BLD: 4 /100 — HIGH (ref 0–0)
NRBC # BLD: SIGNIFICANT CHANGE UP /100 WBCS (ref 0–0)
NRBC # BLD: SIGNIFICANT CHANGE UP /100 WBCS (ref 0–0)
NT-PROBNP SERPL-SCNC: 3709 PG/ML — HIGH (ref 0–300)
OVALOCYTES BLD QL SMEAR: SLIGHT — SIGNIFICANT CHANGE UP
PCO2 BLDA: 29 MMHG — LOW (ref 35–48)
PCO2 BLDA: 32 MMHG — LOW (ref 35–48)
PCO2 BLDA: 39 MMHG — SIGNIFICANT CHANGE UP (ref 35–48)
PCO2 BLDA: 40 MMHG — SIGNIFICANT CHANGE UP (ref 35–48)
PCO2 BLDA: 40 MMHG — SIGNIFICANT CHANGE UP (ref 35–48)
PCO2 BLDA: 42 MMHG — SIGNIFICANT CHANGE UP (ref 35–48)
PCO2 BLDA: 43 MMHG — SIGNIFICANT CHANGE UP (ref 35–48)
PCO2 BLDA: 43 MMHG — SIGNIFICANT CHANGE UP (ref 35–48)
PCO2 BLDA: 44 MMHG — SIGNIFICANT CHANGE UP (ref 35–48)
PCO2 BLDA: 44 MMHG — SIGNIFICANT CHANGE UP (ref 35–48)
PCO2 BLDA: 45 MMHG — SIGNIFICANT CHANGE UP (ref 35–48)
PCO2 BLDA: 45 MMHG — SIGNIFICANT CHANGE UP (ref 35–48)
PCO2 BLDA: 46 MMHG — SIGNIFICANT CHANGE UP (ref 35–48)
PCO2 BLDA: 47 MMHG — SIGNIFICANT CHANGE UP (ref 35–48)
PCO2 BLDA: 48 MMHG — SIGNIFICANT CHANGE UP (ref 35–48)
PCO2 BLDA: 51 MMHG — HIGH (ref 35–48)
PCO2 BLDV: 52 MMHG — SIGNIFICANT CHANGE UP (ref 42–55)
PH BLDA: 7.2 — CRITICAL LOW (ref 7.35–7.45)
PH BLDA: 7.21 — LOW (ref 7.35–7.45)
PH BLDA: 7.23 — LOW (ref 7.35–7.45)
PH BLDA: 7.23 — LOW (ref 7.35–7.45)
PH BLDA: 7.24 — LOW (ref 7.35–7.45)
PH BLDA: 7.24 — LOW (ref 7.35–7.45)
PH BLDA: 7.25 — LOW (ref 7.35–7.45)
PH BLDA: 7.26 — LOW (ref 7.35–7.45)
PH BLDA: 7.27 — LOW (ref 7.35–7.45)
PH BLDA: 7.29 — LOW (ref 7.35–7.45)
PH BLDA: 7.31 — LOW (ref 7.35–7.45)
PH BLDA: 7.36 — SIGNIFICANT CHANGE UP (ref 7.35–7.45)
PH BLDA: 7.45 — SIGNIFICANT CHANGE UP (ref 7.35–7.45)
PH BLDA: 7.55 — HIGH (ref 7.35–7.45)
PH BLDV: 7.21 — LOW (ref 7.32–7.43)
PH UR: 5.5 — SIGNIFICANT CHANGE UP (ref 5–8)
PH UR: 7 — SIGNIFICANT CHANGE UP (ref 5–8)
PHOSPHATE SERPL-MCNC: 3 MG/DL — SIGNIFICANT CHANGE UP (ref 2.5–4.5)
PHOSPHATE SERPL-MCNC: 3.1 MG/DL — SIGNIFICANT CHANGE UP (ref 2.5–4.5)
PHOSPHATE SERPL-MCNC: 3.1 MG/DL — SIGNIFICANT CHANGE UP (ref 2.5–4.5)
PHOSPHATE SERPL-MCNC: 3.3 MG/DL — SIGNIFICANT CHANGE UP (ref 2.5–4.5)
PHOSPHATE SERPL-MCNC: 3.7 MG/DL — SIGNIFICANT CHANGE UP (ref 2.5–4.5)
PHOSPHATE SERPL-MCNC: 4.4 MG/DL — SIGNIFICANT CHANGE UP (ref 2.5–4.5)
PHOSPHATE SERPL-MCNC: 4.5 MG/DL — SIGNIFICANT CHANGE UP (ref 2.5–4.5)
PHOSPHATE SERPL-MCNC: 5 MG/DL — HIGH (ref 2.5–4.5)
PHOSPHATE SERPL-MCNC: 5.4 MG/DL — HIGH (ref 2.5–4.5)
PHOSPHATE SERPL-MCNC: 6.9 MG/DL — HIGH (ref 2.5–4.5)
PLAT MORPH BLD: ABNORMAL
PLAT MORPH BLD: NORMAL — SIGNIFICANT CHANGE UP
PLATELET # BLD AUTO: 196 K/UL — SIGNIFICANT CHANGE UP (ref 150–400)
PLATELET # BLD AUTO: 206 K/UL — SIGNIFICANT CHANGE UP (ref 150–400)
PLATELET # BLD AUTO: 207 K/UL — SIGNIFICANT CHANGE UP (ref 150–400)
PLATELET # BLD AUTO: 215 K/UL — SIGNIFICANT CHANGE UP (ref 150–400)
PLATELET # BLD AUTO: 235 K/UL — SIGNIFICANT CHANGE UP (ref 150–400)
PLATELET # BLD AUTO: 257 K/UL — SIGNIFICANT CHANGE UP (ref 150–400)
PLATELET # BLD AUTO: 258 K/UL — SIGNIFICANT CHANGE UP (ref 150–400)
PLATELET # BLD AUTO: 260 K/UL — SIGNIFICANT CHANGE UP (ref 150–400)
PLATELET # BLD AUTO: 267 K/UL — SIGNIFICANT CHANGE UP (ref 150–400)
PLATELET # BLD AUTO: 273 K/UL — SIGNIFICANT CHANGE UP (ref 150–400)
PLATELET # BLD AUTO: 301 K/UL — SIGNIFICANT CHANGE UP (ref 150–400)
PLATELET # BLD AUTO: 307 K/UL — SIGNIFICANT CHANGE UP (ref 150–400)
PLATELET # BLD AUTO: 330 K/UL — SIGNIFICANT CHANGE UP (ref 150–400)
PO2 BLDA: 100 MMHG — SIGNIFICANT CHANGE UP (ref 83–108)
PO2 BLDA: 107 MMHG — SIGNIFICANT CHANGE UP (ref 83–108)
PO2 BLDA: 118 MMHG — HIGH (ref 83–108)
PO2 BLDA: 146 MMHG — HIGH (ref 83–108)
PO2 BLDA: 167 MMHG — HIGH (ref 83–108)
PO2 BLDA: 222 MMHG — HIGH (ref 83–108)
PO2 BLDA: 61 MMHG — LOW (ref 83–108)
PO2 BLDA: 63 MMHG — LOW (ref 83–108)
PO2 BLDA: 69 MMHG — LOW (ref 83–108)
PO2 BLDA: 72 MMHG — LOW (ref 83–108)
PO2 BLDA: 74 MMHG — LOW (ref 83–108)
PO2 BLDA: 79 MMHG — LOW (ref 83–108)
PO2 BLDA: 79 MMHG — LOW (ref 83–108)
PO2 BLDA: 82 MMHG — LOW (ref 83–108)
PO2 BLDA: 88 MMHG — SIGNIFICANT CHANGE UP (ref 83–108)
PO2 BLDA: 98 MMHG — SIGNIFICANT CHANGE UP (ref 83–108)
PO2 BLDV: 21 MMHG — LOW (ref 25–45)
POIKILOCYTOSIS BLD QL AUTO: SLIGHT — SIGNIFICANT CHANGE UP
POLYCHROMASIA BLD QL SMEAR: SIGNIFICANT CHANGE UP
POLYCHROMASIA BLD QL SMEAR: SLIGHT — SIGNIFICANT CHANGE UP
POTASSIUM BLDV-SCNC: 4.3 MMOL/L — SIGNIFICANT CHANGE UP (ref 3.5–5.1)
POTASSIUM SERPL-MCNC: 3.8 MMOL/L — SIGNIFICANT CHANGE UP (ref 3.5–5.3)
POTASSIUM SERPL-MCNC: 3.8 MMOL/L — SIGNIFICANT CHANGE UP (ref 3.5–5.3)
POTASSIUM SERPL-MCNC: 3.9 MMOL/L — SIGNIFICANT CHANGE UP (ref 3.5–5.3)
POTASSIUM SERPL-MCNC: 4 MMOL/L — SIGNIFICANT CHANGE UP (ref 3.5–5.3)
POTASSIUM SERPL-MCNC: 4.1 MMOL/L — SIGNIFICANT CHANGE UP (ref 3.5–5.3)
POTASSIUM SERPL-MCNC: 4.2 MMOL/L — SIGNIFICANT CHANGE UP (ref 3.5–5.3)
POTASSIUM SERPL-MCNC: 4.4 MMOL/L — SIGNIFICANT CHANGE UP (ref 3.5–5.3)
POTASSIUM SERPL-MCNC: 4.5 MMOL/L — SIGNIFICANT CHANGE UP (ref 3.5–5.3)
POTASSIUM SERPL-MCNC: 4.6 MMOL/L — SIGNIFICANT CHANGE UP (ref 3.5–5.3)
POTASSIUM SERPL-MCNC: 4.7 MMOL/L — SIGNIFICANT CHANGE UP (ref 3.5–5.3)
POTASSIUM SERPL-MCNC: 4.8 MMOL/L — SIGNIFICANT CHANGE UP (ref 3.5–5.3)
POTASSIUM SERPL-MCNC: 4.9 MMOL/L — SIGNIFICANT CHANGE UP (ref 3.5–5.3)
POTASSIUM SERPL-MCNC: 5.1 MMOL/L — SIGNIFICANT CHANGE UP (ref 3.5–5.3)
POTASSIUM SERPL-MCNC: 5.5 MMOL/L — HIGH (ref 3.5–5.3)
POTASSIUM SERPL-MCNC: 5.6 MMOL/L — HIGH (ref 3.5–5.3)
POTASSIUM SERPL-MCNC: 5.8 MMOL/L — HIGH (ref 3.5–5.3)
POTASSIUM SERPL-SCNC: 3.8 MMOL/L — SIGNIFICANT CHANGE UP (ref 3.5–5.3)
POTASSIUM SERPL-SCNC: 3.8 MMOL/L — SIGNIFICANT CHANGE UP (ref 3.5–5.3)
POTASSIUM SERPL-SCNC: 3.9 MMOL/L — SIGNIFICANT CHANGE UP (ref 3.5–5.3)
POTASSIUM SERPL-SCNC: 4 MMOL/L — SIGNIFICANT CHANGE UP (ref 3.5–5.3)
POTASSIUM SERPL-SCNC: 4.1 MMOL/L — SIGNIFICANT CHANGE UP (ref 3.5–5.3)
POTASSIUM SERPL-SCNC: 4.2 MMOL/L — SIGNIFICANT CHANGE UP (ref 3.5–5.3)
POTASSIUM SERPL-SCNC: 4.4 MMOL/L — SIGNIFICANT CHANGE UP (ref 3.5–5.3)
POTASSIUM SERPL-SCNC: 4.5 MMOL/L — SIGNIFICANT CHANGE UP (ref 3.5–5.3)
POTASSIUM SERPL-SCNC: 4.6 MMOL/L — SIGNIFICANT CHANGE UP (ref 3.5–5.3)
POTASSIUM SERPL-SCNC: 4.7 MMOL/L — SIGNIFICANT CHANGE UP (ref 3.5–5.3)
POTASSIUM SERPL-SCNC: 4.8 MMOL/L — SIGNIFICANT CHANGE UP (ref 3.5–5.3)
POTASSIUM SERPL-SCNC: 4.9 MMOL/L — SIGNIFICANT CHANGE UP (ref 3.5–5.3)
POTASSIUM SERPL-SCNC: 5.1 MMOL/L — SIGNIFICANT CHANGE UP (ref 3.5–5.3)
POTASSIUM SERPL-SCNC: 5.5 MMOL/L — HIGH (ref 3.5–5.3)
POTASSIUM SERPL-SCNC: 5.6 MMOL/L — HIGH (ref 3.5–5.3)
POTASSIUM SERPL-SCNC: 5.8 MMOL/L — HIGH (ref 3.5–5.3)
PROMYELOCYTES # FLD: 0.9 % — HIGH (ref 0–0)
PROT SERPL-MCNC: 4.8 G/DL — LOW (ref 6–8.3)
PROT SERPL-MCNC: 4.9 G/DL — LOW (ref 6–8.3)
PROT SERPL-MCNC: 5.1 G/DL — LOW (ref 6–8.3)
PROT SERPL-MCNC: 5.2 G/DL — LOW (ref 6–8.3)
PROT SERPL-MCNC: 5.2 G/DL — LOW (ref 6–8.3)
PROT SERPL-MCNC: 5.3 G/DL — LOW (ref 6–8.3)
PROT SERPL-MCNC: 5.4 G/DL — LOW (ref 6–8.3)
PROT SERPL-MCNC: 5.5 G/DL — LOW (ref 6–8.3)
PROT SERPL-MCNC: 5.6 G/DL — LOW (ref 6–8.3)
PROT SERPL-MCNC: 5.6 G/DL — LOW (ref 6–8.3)
PROT SERPL-MCNC: 5.8 G/DL — LOW (ref 6–8.3)
PROT SERPL-MCNC: 7.4 G/DL — SIGNIFICANT CHANGE UP (ref 6–8.3)
PROT UR-MCNC: 100 MG/DL
PROT UR-MCNC: NEGATIVE MG/DL — SIGNIFICANT CHANGE UP
PROTHROM AB SERPL-ACNC: 14.3 SEC — HIGH (ref 10.6–13.6)
PROTHROM AB SERPL-ACNC: 22.4 SEC — HIGH (ref 10.6–13.6)
RBC # BLD: 3.9 M/UL — LOW (ref 4.2–5.8)
RBC # BLD: 3.95 M/UL — LOW (ref 4.2–5.8)
RBC # BLD: 3.95 M/UL — LOW (ref 4.2–5.8)
RBC # BLD: 3.96 M/UL — LOW (ref 4.2–5.8)
RBC # BLD: 4.07 M/UL — LOW (ref 4.2–5.8)
RBC # BLD: 4.17 M/UL — LOW (ref 4.2–5.8)
RBC # BLD: 4.19 M/UL — LOW (ref 4.2–5.8)
RBC # BLD: 4.24 M/UL — SIGNIFICANT CHANGE UP (ref 4.2–5.8)
RBC # BLD: 4.25 M/UL — SIGNIFICANT CHANGE UP (ref 4.2–5.8)
RBC # BLD: 4.29 M/UL — SIGNIFICANT CHANGE UP (ref 4.2–5.8)
RBC # BLD: 4.38 M/UL — SIGNIFICANT CHANGE UP (ref 4.2–5.8)
RBC # BLD: 4.41 M/UL — SIGNIFICANT CHANGE UP (ref 4.2–5.8)
RBC # BLD: 4.94 M/UL — SIGNIFICANT CHANGE UP (ref 4.2–5.8)
RBC # FLD: 13.6 % — SIGNIFICANT CHANGE UP (ref 10.3–14.5)
RBC # FLD: 14.1 % — SIGNIFICANT CHANGE UP (ref 10.3–14.5)
RBC # FLD: 14.8 % — HIGH (ref 10.3–14.5)
RBC # FLD: 15 % — HIGH (ref 10.3–14.5)
RBC # FLD: 15.4 % — HIGH (ref 10.3–14.5)
RBC # FLD: 15.4 % — HIGH (ref 10.3–14.5)
RBC # FLD: 16.1 % — HIGH (ref 10.3–14.5)
RBC # FLD: 17.1 % — HIGH (ref 10.3–14.5)
RBC # FLD: 17.2 % — HIGH (ref 10.3–14.5)
RBC # FLD: 17.3 % — HIGH (ref 10.3–14.5)
RBC # FLD: 18 % — HIGH (ref 10.3–14.5)
RBC # FLD: 18 % — HIGH (ref 10.3–14.5)
RBC # FLD: 18.3 % — HIGH (ref 10.3–14.5)
RBC BLD AUTO: ABNORMAL
RBC CASTS # UR COMP ASSIST: ABNORMAL /HPF
RBC CASTS # UR COMP ASSIST: ABNORMAL /HPF
SALICYLATES SERPL-MCNC: <0.3 MG/DL — LOW (ref 2.8–20)
SAO2 % BLDA: 90.5 % — LOW (ref 94–98)
SAO2 % BLDA: 92.7 % — LOW (ref 94–98)
SAO2 % BLDA: 95.3 % — SIGNIFICANT CHANGE UP (ref 94–98)
SAO2 % BLDA: 95.3 % — SIGNIFICANT CHANGE UP (ref 94–98)
SAO2 % BLDA: 95.9 % — SIGNIFICANT CHANGE UP (ref 94–98)
SAO2 % BLDA: 96.3 % — SIGNIFICANT CHANGE UP (ref 94–98)
SAO2 % BLDA: 96.3 % — SIGNIFICANT CHANGE UP (ref 94–98)
SAO2 % BLDA: 97 % — SIGNIFICANT CHANGE UP (ref 94–98)
SAO2 % BLDA: 97.3 % — SIGNIFICANT CHANGE UP (ref 94–98)
SAO2 % BLDA: 98 % — SIGNIFICANT CHANGE UP (ref 94–98)
SAO2 % BLDA: 98.4 % — HIGH (ref 94–98)
SAO2 % BLDA: 98.5 % — HIGH (ref 94–98)
SAO2 % BLDA: 99 % — HIGH (ref 94–98)
SAO2 % BLDA: 99 % — HIGH (ref 94–98)
SAO2 % BLDA: 99.5 % — HIGH (ref 94–98)
SAO2 % BLDA: 99.8 % — HIGH (ref 94–98)
SAO2 % BLDV: 23.8 % — LOW (ref 67–88)
SARS-COV-2 IGG+IGM SERPL QL IA: 31.9 INDEX — HIGH
SARS-COV-2 IGG+IGM SERPL QL IA: >250 U/ML — HIGH
SARS-COV-2 IGG+IGM SERPL QL IA: POSITIVE
SARS-COV-2 IGG+IGM SERPL QL IA: POSITIVE
SARS-COV-2 RNA SPEC QL NAA+PROBE: DETECTED
SARS-COV-2 RNA SPEC QL NAA+PROBE: POSITIVE
SMUDGE CELLS # BLD: PRESENT — SIGNIFICANT CHANGE UP
SODIUM SERPL-SCNC: 137 MMOL/L — SIGNIFICANT CHANGE UP (ref 135–145)
SODIUM SERPL-SCNC: 138 MMOL/L — SIGNIFICANT CHANGE UP (ref 135–145)
SODIUM SERPL-SCNC: 141 MMOL/L — SIGNIFICANT CHANGE UP (ref 135–145)
SODIUM SERPL-SCNC: 142 MMOL/L — SIGNIFICANT CHANGE UP (ref 135–145)
SODIUM SERPL-SCNC: 143 MMOL/L — SIGNIFICANT CHANGE UP (ref 135–145)
SODIUM SERPL-SCNC: 145 MMOL/L — SIGNIFICANT CHANGE UP (ref 135–145)
SODIUM SERPL-SCNC: 145 MMOL/L — SIGNIFICANT CHANGE UP (ref 135–145)
SODIUM SERPL-SCNC: 146 MMOL/L — HIGH (ref 135–145)
SODIUM SERPL-SCNC: 148 MMOL/L — HIGH (ref 135–145)
SODIUM SERPL-SCNC: 149 MMOL/L — HIGH (ref 135–145)
SODIUM SERPL-SCNC: 150 MMOL/L — HIGH (ref 135–145)
SODIUM SERPL-SCNC: 151 MMOL/L — HIGH (ref 135–145)
SODIUM SERPL-SCNC: 153 MMOL/L — HIGH (ref 135–145)
SODIUM SERPL-SCNC: 154 MMOL/L — HIGH (ref 135–145)
SP GR SPEC: 1.01 — SIGNIFICANT CHANGE UP (ref 1–1.03)
SP GR SPEC: 1.02 — SIGNIFICANT CHANGE UP (ref 1–1.03)
SPECIMEN SOURCE: SIGNIFICANT CHANGE UP
SPHEROCYTES BLD QL SMEAR: SLIGHT — SIGNIFICANT CHANGE UP
TRIGL SERPL-MCNC: 167 MG/DL — HIGH
TROPONIN T SERPL-MCNC: 0.51 NG/ML — CRITICAL HIGH (ref 0–0.01)
TROPONIN T SERPL-MCNC: 1.25 NG/ML — CRITICAL HIGH (ref 0–0.01)
TROPONIN T SERPL-MCNC: 1.36 NG/ML — CRITICAL HIGH (ref 0–0.01)
UROBILINOGEN FLD QL: 0.2 E.U./DL — SIGNIFICANT CHANGE UP
UROBILINOGEN FLD QL: 1 E.U./DL — SIGNIFICANT CHANGE UP
VANCOMYCIN TROUGH SERPL-MCNC: 24.5 UG/ML — HIGH (ref 10–20)
VANCOMYCIN TROUGH SERPL-MCNC: 9.8 UG/ML — LOW (ref 10–20)
VARIANT LYMPHS # BLD: 0.9 % — SIGNIFICANT CHANGE UP (ref 0–6)
VARIANT LYMPHS # BLD: 0.9 % — SIGNIFICANT CHANGE UP (ref 0–6)
WBC # BLD: 11.04 K/UL — HIGH (ref 3.8–10.5)
WBC # BLD: 11.59 K/UL — HIGH (ref 3.8–10.5)
WBC # BLD: 11.65 K/UL — HIGH (ref 3.8–10.5)
WBC # BLD: 12.53 K/UL — HIGH (ref 3.8–10.5)
WBC # BLD: 13.17 K/UL — HIGH (ref 3.8–10.5)
WBC # BLD: 13.54 K/UL — HIGH (ref 3.8–10.5)
WBC # BLD: 13.78 K/UL — HIGH (ref 3.8–10.5)
WBC # BLD: 14.6 K/UL — HIGH (ref 3.8–10.5)
WBC # BLD: 14.9 K/UL — HIGH (ref 3.8–10.5)
WBC # BLD: 16.26 K/UL — HIGH (ref 3.8–10.5)
WBC # BLD: 18.2 K/UL — HIGH (ref 3.8–10.5)
WBC # BLD: 8.68 K/UL — SIGNIFICANT CHANGE UP (ref 3.8–10.5)
WBC # BLD: 9.36 K/UL — SIGNIFICANT CHANGE UP (ref 3.8–10.5)
WBC # FLD AUTO: 11.04 K/UL — HIGH (ref 3.8–10.5)
WBC # FLD AUTO: 11.59 K/UL — HIGH (ref 3.8–10.5)
WBC # FLD AUTO: 11.65 K/UL — HIGH (ref 3.8–10.5)
WBC # FLD AUTO: 12.53 K/UL — HIGH (ref 3.8–10.5)
WBC # FLD AUTO: 13.17 K/UL — HIGH (ref 3.8–10.5)
WBC # FLD AUTO: 13.54 K/UL — HIGH (ref 3.8–10.5)
WBC # FLD AUTO: 13.78 K/UL — HIGH (ref 3.8–10.5)
WBC # FLD AUTO: 14.6 K/UL — HIGH (ref 3.8–10.5)
WBC # FLD AUTO: 14.9 K/UL — HIGH (ref 3.8–10.5)
WBC # FLD AUTO: 16.26 K/UL — HIGH (ref 3.8–10.5)
WBC # FLD AUTO: 18.2 K/UL — HIGH (ref 3.8–10.5)
WBC # FLD AUTO: 8.68 K/UL — SIGNIFICANT CHANGE UP (ref 3.8–10.5)
WBC # FLD AUTO: 9.36 K/UL — SIGNIFICANT CHANGE UP (ref 3.8–10.5)
WBC UR QL: < 5 /HPF — SIGNIFICANT CHANGE UP
WBC UR QL: < 5 /HPF — SIGNIFICANT CHANGE UP

## 2021-01-01 PROCEDURE — 99291 CRITICAL CARE FIRST HOUR: CPT | Mod: CS,25

## 2021-01-01 PROCEDURE — 99497 ADVNCD CARE PLAN 30 MIN: CPT | Mod: 25

## 2021-01-01 PROCEDURE — 71045 X-RAY EXAM CHEST 1 VIEW: CPT | Mod: 26

## 2021-01-01 PROCEDURE — 99291 CRITICAL CARE FIRST HOUR: CPT

## 2021-01-01 PROCEDURE — 72125 CT NECK SPINE W/O DYE: CPT | Mod: 26,MD

## 2021-01-01 PROCEDURE — 74177 CT ABD & PELVIS W/CONTRAST: CPT | Mod: 26,MA

## 2021-01-01 PROCEDURE — 99291 CRITICAL CARE FIRST HOUR: CPT | Mod: 25

## 2021-01-01 PROCEDURE — 99233 SBSQ HOSP IP/OBS HIGH 50: CPT | Mod: GC

## 2021-01-01 PROCEDURE — 99358 PROLONG SERVICE W/O CONTACT: CPT | Mod: NC

## 2021-01-01 PROCEDURE — 36556 INSERT NON-TUNNEL CV CATH: CPT

## 2021-01-01 PROCEDURE — 43752 NASAL/OROGASTRIC W/TUBE PLMT: CPT

## 2021-01-01 PROCEDURE — 36000 PLACE NEEDLE IN VEIN: CPT

## 2021-01-01 PROCEDURE — 71045 X-RAY EXAM CHEST 1 VIEW: CPT | Mod: 26,77

## 2021-01-01 PROCEDURE — 36620 INSERTION CATHETER ARTERY: CPT | Mod: GC

## 2021-01-01 PROCEDURE — 99221 1ST HOSP IP/OBS SF/LOW 40: CPT

## 2021-01-01 PROCEDURE — 73120 X-RAY EXAM OF HAND: CPT | Mod: 26,50

## 2021-01-01 PROCEDURE — 36620 INSERTION CATHETER ARTERY: CPT

## 2021-01-01 PROCEDURE — 93010 ELECTROCARDIOGRAM REPORT: CPT | Mod: 59

## 2021-01-01 PROCEDURE — 70450 CT HEAD/BRAIN W/O DYE: CPT | Mod: 26,MB

## 2021-01-01 PROCEDURE — 71275 CT ANGIOGRAPHY CHEST: CPT | Mod: 26,MA

## 2021-01-01 PROCEDURE — 99233 SBSQ HOSP IP/OBS HIGH 50: CPT

## 2021-01-01 PROCEDURE — 31500 INSERT EMERGENCY AIRWAY: CPT

## 2021-01-01 PROCEDURE — 93010 ELECTROCARDIOGRAM REPORT: CPT

## 2021-01-01 PROCEDURE — 36010 PLACE CATHETER IN VEIN: CPT

## 2021-01-01 PROCEDURE — 76937 US GUIDE VASCULAR ACCESS: CPT | Mod: 26

## 2021-01-01 PROCEDURE — 31624 DX BRONCHOSCOPE/LAVAGE: CPT | Mod: GC

## 2021-01-01 PROCEDURE — 99223 1ST HOSP IP/OBS HIGH 75: CPT

## 2021-01-01 RX ORDER — LEVOTHYROXINE SODIUM 125 MCG
37.5 TABLET ORAL AT BEDTIME
Refills: 0 | Status: DISCONTINUED | OUTPATIENT
Start: 2021-01-01 | End: 2021-01-01

## 2021-01-01 RX ORDER — ACETAMINOPHEN 500 MG
650 TABLET ORAL EVERY 6 HOURS
Refills: 0 | Status: DISCONTINUED | OUTPATIENT
Start: 2021-01-01 | End: 2021-01-01

## 2021-01-01 RX ORDER — HYDROMORPHONE HYDROCHLORIDE 2 MG/ML
4 INJECTION INTRAMUSCULAR; INTRAVENOUS; SUBCUTANEOUS
Refills: 0 | Status: DISCONTINUED | OUTPATIENT
Start: 2021-01-01 | End: 2021-01-01

## 2021-01-01 RX ORDER — HEPARIN SODIUM 5000 [USP'U]/ML
1500 INJECTION INTRAVENOUS; SUBCUTANEOUS
Qty: 25000 | Refills: 0 | Status: DISCONTINUED | OUTPATIENT
Start: 2021-01-01 | End: 2021-01-01

## 2021-01-01 RX ORDER — FENTANYL CITRATE 50 UG/ML
50 INJECTION INTRAVENOUS
Refills: 0 | Status: DISCONTINUED | OUTPATIENT
Start: 2021-01-01 | End: 2021-01-01

## 2021-01-01 RX ORDER — VASOPRESSIN 20 [USP'U]/ML
0.04 INJECTION INTRAVENOUS
Qty: 50 | Refills: 0 | Status: DISCONTINUED | OUTPATIENT
Start: 2021-01-01 | End: 2021-01-01

## 2021-01-01 RX ORDER — REMDESIVIR 5 MG/ML
INJECTION INTRAVENOUS
Refills: 0 | Status: DISCONTINUED | OUTPATIENT
Start: 2021-01-01 | End: 2021-01-01

## 2021-01-01 RX ORDER — SODIUM CHLORIDE 9 MG/ML
1000 INJECTION, SOLUTION INTRAVENOUS
Refills: 0 | Status: DISCONTINUED | OUTPATIENT
Start: 2021-01-01 | End: 2021-01-01

## 2021-01-01 RX ORDER — INSULIN HUMAN 100 [IU]/ML
10 INJECTION, SOLUTION SUBCUTANEOUS ONCE
Refills: 0 | Status: COMPLETED | OUTPATIENT
Start: 2021-01-01 | End: 2021-01-01

## 2021-01-01 RX ORDER — DEXTROSE 50 % IN WATER 50 %
25 SYRINGE (ML) INTRAVENOUS ONCE
Refills: 0 | Status: DISCONTINUED | OUTPATIENT
Start: 2021-01-01 | End: 2021-01-01

## 2021-01-01 RX ORDER — PROPOFOL 10 MG/ML
10 INJECTION, EMULSION INTRAVENOUS
Qty: 1000 | Refills: 0 | Status: DISCONTINUED | OUTPATIENT
Start: 2021-01-01 | End: 2021-01-01

## 2021-01-01 RX ORDER — ENOXAPARIN SODIUM 100 MG/ML
40 INJECTION SUBCUTANEOUS EVERY 24 HOURS
Refills: 0 | Status: DISCONTINUED | OUTPATIENT
Start: 2021-01-01 | End: 2021-01-01

## 2021-01-01 RX ORDER — PROPOFOL 10 MG/ML
5 INJECTION, EMULSION INTRAVENOUS
Qty: 1000 | Refills: 0 | Status: DISCONTINUED | OUTPATIENT
Start: 2021-01-01 | End: 2021-01-01

## 2021-01-01 RX ORDER — MEROPENEM 1 G/30ML
1000 INJECTION INTRAVENOUS EVERY 8 HOURS
Refills: 0 | Status: DISCONTINUED | OUTPATIENT
Start: 2021-01-01 | End: 2021-01-01

## 2021-01-01 RX ORDER — DEXMEDETOMIDINE HYDROCHLORIDE IN 0.9% SODIUM CHLORIDE 4 UG/ML
0.2 INJECTION INTRAVENOUS
Qty: 400 | Refills: 0 | Status: DISCONTINUED | OUTPATIENT
Start: 2021-01-01 | End: 2021-01-01

## 2021-01-01 RX ORDER — SODIUM CHLORIDE 9 MG/ML
1000 INJECTION INTRAMUSCULAR; INTRAVENOUS; SUBCUTANEOUS ONCE
Refills: 0 | Status: COMPLETED | OUTPATIENT
Start: 2021-01-01 | End: 2021-01-01

## 2021-01-01 RX ORDER — FENTANYL CITRATE 50 UG/ML
0.5 INJECTION INTRAVENOUS
Qty: 2500 | Refills: 0 | Status: DISCONTINUED | OUTPATIENT
Start: 2021-01-01 | End: 2021-01-01

## 2021-01-01 RX ORDER — RIVAROXABAN 15 MG-20MG
0 KIT ORAL
Qty: 0 | Refills: 0 | DISCHARGE

## 2021-01-01 RX ORDER — POTASSIUM CHLORIDE 20 MEQ
20 PACKET (EA) ORAL ONCE
Refills: 0 | Status: COMPLETED | OUTPATIENT
Start: 2021-01-01 | End: 2021-01-01

## 2021-01-01 RX ORDER — INSULIN LISPRO 100/ML
VIAL (ML) SUBCUTANEOUS AT BEDTIME
Refills: 0 | Status: DISCONTINUED | OUTPATIENT
Start: 2021-01-01 | End: 2021-01-01

## 2021-01-01 RX ORDER — HEPARIN SODIUM 5000 [USP'U]/ML
1300 INJECTION INTRAVENOUS; SUBCUTANEOUS
Qty: 25000 | Refills: 0 | Status: DISCONTINUED | OUTPATIENT
Start: 2021-01-01 | End: 2021-01-01

## 2021-01-01 RX ORDER — DEXAMETHASONE 0.5 MG/5ML
6 ELIXIR ORAL ONCE
Refills: 0 | Status: COMPLETED | OUTPATIENT
Start: 2021-01-01 | End: 2021-01-01

## 2021-01-01 RX ORDER — PIPERACILLIN AND TAZOBACTAM 4; .5 G/20ML; G/20ML
3.38 INJECTION, POWDER, LYOPHILIZED, FOR SOLUTION INTRAVENOUS ONCE
Refills: 0 | Status: COMPLETED | OUTPATIENT
Start: 2021-01-01 | End: 2021-01-01

## 2021-01-01 RX ORDER — INSULIN LISPRO 100/ML
VIAL (ML) SUBCUTANEOUS EVERY 6 HOURS
Refills: 0 | Status: DISCONTINUED | OUTPATIENT
Start: 2021-01-01 | End: 2021-01-01

## 2021-01-01 RX ORDER — MIDAZOLAM HYDROCHLORIDE 1 MG/ML
4 INJECTION, SOLUTION INTRAMUSCULAR; INTRAVENOUS ONCE
Refills: 0 | Status: DISCONTINUED | OUTPATIENT
Start: 2021-01-01 | End: 2021-01-01

## 2021-01-01 RX ORDER — MIDAZOLAM HYDROCHLORIDE 1 MG/ML
2 INJECTION, SOLUTION INTRAMUSCULAR; INTRAVENOUS ONCE
Refills: 0 | Status: DISCONTINUED | OUTPATIENT
Start: 2021-01-01 | End: 2021-01-01

## 2021-01-01 RX ORDER — VANCOMYCIN HCL 1 G
1500 VIAL (EA) INTRAVENOUS ONCE
Refills: 0 | Status: COMPLETED | OUTPATIENT
Start: 2021-01-01 | End: 2021-01-01

## 2021-01-01 RX ORDER — HYDROMORPHONE HYDROCHLORIDE 2 MG/ML
5 INJECTION INTRAMUSCULAR; INTRAVENOUS; SUBCUTANEOUS
Refills: 0 | Status: DISCONTINUED | OUTPATIENT
Start: 2021-01-01 | End: 2021-01-01

## 2021-01-01 RX ORDER — REMDESIVIR 5 MG/ML
100 INJECTION INTRAVENOUS EVERY 24 HOURS
Refills: 0 | Status: DISCONTINUED | OUTPATIENT
Start: 2021-01-01 | End: 2021-01-01

## 2021-01-01 RX ORDER — FENTANYL CITRATE 50 UG/ML
100 INJECTION INTRAVENOUS ONCE
Refills: 0 | Status: DISCONTINUED | OUTPATIENT
Start: 2021-01-01 | End: 2021-01-01

## 2021-01-01 RX ORDER — ROBINUL 0.2 MG/ML
0.4 INJECTION INTRAMUSCULAR; INTRAVENOUS EVERY 4 HOURS
Refills: 0 | Status: DISCONTINUED | OUTPATIENT
Start: 2021-01-01 | End: 2021-01-01

## 2021-01-01 RX ORDER — FUROSEMIDE 40 MG
40 TABLET ORAL ONCE
Refills: 0 | Status: COMPLETED | OUTPATIENT
Start: 2021-01-01 | End: 2021-01-01

## 2021-01-01 RX ORDER — NOREPINEPHRINE BITARTRATE/D5W 8 MG/250ML
0.05 PLASTIC BAG, INJECTION (ML) INTRAVENOUS
Qty: 8 | Refills: 0 | Status: DISCONTINUED | OUTPATIENT
Start: 2021-01-01 | End: 2021-01-01

## 2021-01-01 RX ORDER — TOCILIZUMAB 20 MG/ML
700 INJECTION, SOLUTION, CONCENTRATE INTRAVENOUS ONCE
Refills: 0 | Status: COMPLETED | OUTPATIENT
Start: 2021-01-01 | End: 2021-01-01

## 2021-01-01 RX ORDER — SODIUM ZIRCONIUM CYCLOSILICATE 10 G/10G
10 POWDER, FOR SUSPENSION ORAL EVERY 8 HOURS
Refills: 0 | Status: COMPLETED | OUTPATIENT
Start: 2021-01-01 | End: 2021-01-01

## 2021-01-01 RX ORDER — GLUCAGON INJECTION, SOLUTION 0.5 MG/.1ML
1 INJECTION, SOLUTION SUBCUTANEOUS ONCE
Refills: 0 | Status: DISCONTINUED | OUTPATIENT
Start: 2021-01-01 | End: 2021-01-01

## 2021-01-01 RX ORDER — IPRATROPIUM/ALBUTEROL SULFATE 18-103MCG
3 AEROSOL WITH ADAPTER (GRAM) INHALATION EVERY 6 HOURS
Refills: 0 | Status: DISCONTINUED | OUTPATIENT
Start: 2021-01-01 | End: 2021-01-01

## 2021-01-01 RX ORDER — PROPOFOL 10 MG/ML
10 INJECTION, EMULSION INTRAVENOUS
Qty: 500 | Refills: 0 | Status: DISCONTINUED | OUTPATIENT
Start: 2021-01-01 | End: 2021-01-01

## 2021-01-01 RX ORDER — CHLORHEXIDINE GLUCONATE 213 G/1000ML
1 SOLUTION TOPICAL
Refills: 0 | Status: DISCONTINUED | OUTPATIENT
Start: 2021-01-01 | End: 2021-01-01

## 2021-01-01 RX ORDER — ATORVASTATIN CALCIUM 80 MG/1
0 TABLET, FILM COATED ORAL
Qty: 0 | Refills: 0 | DISCHARGE

## 2021-01-01 RX ORDER — CHLORHEXIDINE GLUCONATE 213 G/1000ML
15 SOLUTION TOPICAL EVERY 12 HOURS
Refills: 0 | Status: DISCONTINUED | OUTPATIENT
Start: 2021-01-01 | End: 2021-01-01

## 2021-01-01 RX ORDER — LISINOPRIL 2.5 MG/1
10 TABLET ORAL EVERY 24 HOURS
Refills: 0 | Status: DISCONTINUED | OUTPATIENT
Start: 2021-01-01 | End: 2021-01-01

## 2021-01-01 RX ORDER — SCOPALAMINE 1 MG/3D
1 PATCH, EXTENDED RELEASE TRANSDERMAL ONCE
Refills: 0 | Status: COMPLETED | OUTPATIENT
Start: 2021-01-01 | End: 2021-01-01

## 2021-01-01 RX ORDER — LEVOTHYROXINE SODIUM 125 MCG
0 TABLET ORAL
Qty: 0 | Refills: 0 | DISCHARGE

## 2021-01-01 RX ORDER — SODIUM ZIRCONIUM CYCLOSILICATE 10 G/10G
10 POWDER, FOR SUSPENSION ORAL ONCE
Refills: 0 | Status: COMPLETED | OUTPATIENT
Start: 2021-01-01 | End: 2021-01-01

## 2021-01-01 RX ORDER — PANTOPRAZOLE SODIUM 20 MG/1
40 TABLET, DELAYED RELEASE ORAL EVERY 24 HOURS
Refills: 0 | Status: DISCONTINUED | OUTPATIENT
Start: 2021-01-01 | End: 2021-01-01

## 2021-01-01 RX ORDER — DEXTROSE 50 % IN WATER 50 %
12.5 SYRINGE (ML) INTRAVENOUS ONCE
Refills: 0 | Status: DISCONTINUED | OUTPATIENT
Start: 2021-01-01 | End: 2021-01-01

## 2021-01-01 RX ORDER — SUCCINYLCHOLINE CHLORIDE 100 MG/5ML
150 SYRINGE (ML) INTRAVENOUS ONCE
Refills: 0 | Status: COMPLETED | OUTPATIENT
Start: 2021-01-01 | End: 2021-01-01

## 2021-01-01 RX ORDER — FENTANYL CITRATE 50 UG/ML
50 INJECTION INTRAVENOUS ONCE
Refills: 0 | Status: DISCONTINUED | OUTPATIENT
Start: 2021-01-01 | End: 2021-01-01

## 2021-01-01 RX ORDER — NOREPINEPHRINE BITARTRATE/D5W 8 MG/250ML
0.05 PLASTIC BAG, INJECTION (ML) INTRAVENOUS
Qty: 16 | Refills: 0 | Status: DISCONTINUED | OUTPATIENT
Start: 2021-01-01 | End: 2021-01-01

## 2021-01-01 RX ORDER — CISATRACURIUM BESYLATE 2 MG/ML
10 INJECTION INTRAVENOUS ONCE
Refills: 0 | Status: COMPLETED | OUTPATIENT
Start: 2021-01-01 | End: 2021-01-01

## 2021-01-01 RX ORDER — MIDODRINE HYDROCHLORIDE 2.5 MG/1
5 TABLET ORAL EVERY 8 HOURS
Refills: 0 | Status: DISCONTINUED | OUTPATIENT
Start: 2021-01-01 | End: 2021-01-01

## 2021-01-01 RX ORDER — NOREPINEPHRINE BITARTRATE/D5W 8 MG/250ML
0.05 PLASTIC BAG, INJECTION (ML) INTRAVENOUS
Qty: 32 | Refills: 0 | Status: DISCONTINUED | OUTPATIENT
Start: 2021-01-01 | End: 2021-01-01

## 2021-01-01 RX ORDER — CISATRACURIUM BESYLATE 2 MG/ML
3 INJECTION INTRAVENOUS
Qty: 200 | Refills: 0 | Status: DISCONTINUED | OUTPATIENT
Start: 2021-01-01 | End: 2021-01-01

## 2021-01-01 RX ORDER — ETOMIDATE 2 MG/ML
30 INJECTION INTRAVENOUS ONCE
Refills: 0 | Status: COMPLETED | OUTPATIENT
Start: 2021-01-01 | End: 2021-01-01

## 2021-01-01 RX ORDER — SENNA PLUS 8.6 MG/1
2 TABLET ORAL AT BEDTIME
Refills: 0 | Status: DISCONTINUED | OUTPATIENT
Start: 2021-01-01 | End: 2021-01-01

## 2021-01-01 RX ORDER — DEXTROSE 50 % IN WATER 50 %
50 SYRINGE (ML) INTRAVENOUS ONCE
Refills: 0 | Status: COMPLETED | OUTPATIENT
Start: 2021-01-01 | End: 2021-01-01

## 2021-01-01 RX ORDER — MIDAZOLAM HYDROCHLORIDE 1 MG/ML
5 INJECTION, SOLUTION INTRAMUSCULAR; INTRAVENOUS ONCE
Refills: 0 | Status: DISCONTINUED | OUTPATIENT
Start: 2021-01-01 | End: 2021-01-01

## 2021-01-01 RX ORDER — VANCOMYCIN HCL 1 G
1000 VIAL (EA) INTRAVENOUS ONCE
Refills: 0 | Status: DISCONTINUED | OUTPATIENT
Start: 2021-01-01 | End: 2021-01-01

## 2021-01-01 RX ORDER — NOREPINEPHRINE BITARTRATE/D5W 8 MG/250ML
0.02 PLASTIC BAG, INJECTION (ML) INTRAVENOUS
Qty: 8 | Refills: 0 | Status: DISCONTINUED | OUTPATIENT
Start: 2021-01-01 | End: 2021-01-01

## 2021-01-01 RX ORDER — LABETALOL HCL 100 MG
10 TABLET ORAL ONCE
Refills: 0 | Status: COMPLETED | OUTPATIENT
Start: 2021-01-01 | End: 2021-01-01

## 2021-01-01 RX ORDER — PIPERACILLIN AND TAZOBACTAM 4; .5 G/20ML; G/20ML
4.5 INJECTION, POWDER, LYOPHILIZED, FOR SOLUTION INTRAVENOUS EVERY 6 HOURS
Refills: 0 | Status: DISCONTINUED | OUTPATIENT
Start: 2021-01-01 | End: 2021-01-01

## 2021-01-01 RX ORDER — VANCOMYCIN HCL 1 G
1250 VIAL (EA) INTRAVENOUS EVERY 24 HOURS
Refills: 0 | Status: COMPLETED | OUTPATIENT
Start: 2021-01-01 | End: 2021-01-01

## 2021-01-01 RX ORDER — PIPERACILLIN AND TAZOBACTAM 4; .5 G/20ML; G/20ML
3.38 INJECTION, POWDER, LYOPHILIZED, FOR SOLUTION INTRAVENOUS EVERY 8 HOURS
Refills: 0 | Status: DISCONTINUED | OUTPATIENT
Start: 2021-01-01 | End: 2021-01-01

## 2021-01-01 RX ORDER — PIPERACILLIN AND TAZOBACTAM 4; .5 G/20ML; G/20ML
3.38 INJECTION, POWDER, LYOPHILIZED, FOR SOLUTION INTRAVENOUS EVERY 6 HOURS
Refills: 0 | Status: DISCONTINUED | OUTPATIENT
Start: 2021-01-01 | End: 2021-01-01

## 2021-01-01 RX ORDER — DEXTROSE 50 % IN WATER 50 %
15 SYRINGE (ML) INTRAVENOUS ONCE
Refills: 0 | Status: DISCONTINUED | OUTPATIENT
Start: 2021-01-01 | End: 2021-01-01

## 2021-01-01 RX ORDER — LISINOPRIL 2.5 MG/1
0 TABLET ORAL
Qty: 0 | Refills: 0 | DISCHARGE

## 2021-01-01 RX ORDER — METOCLOPRAMIDE HCL 10 MG
5 TABLET ORAL EVERY 8 HOURS
Refills: 0 | Status: COMPLETED | OUTPATIENT
Start: 2021-01-01 | End: 2021-01-01

## 2021-01-01 RX ORDER — DEXAMETHASONE 0.5 MG/5ML
6 ELIXIR ORAL EVERY 24 HOURS
Refills: 0 | Status: COMPLETED | OUTPATIENT
Start: 2021-01-01 | End: 2021-01-01

## 2021-01-01 RX ORDER — PROPOFOL 10 MG/ML
10.01 INJECTION, EMULSION INTRAVENOUS
Qty: 1000 | Refills: 0 | Status: DISCONTINUED | OUTPATIENT
Start: 2021-01-01 | End: 2021-01-01

## 2021-01-01 RX ORDER — ALLOPURINOL 300 MG
0 TABLET ORAL
Qty: 0 | Refills: 0 | DISCHARGE

## 2021-01-01 RX ORDER — REMDESIVIR 5 MG/ML
200 INJECTION INTRAVENOUS EVERY 24 HOURS
Refills: 0 | Status: COMPLETED | OUTPATIENT
Start: 2021-01-01 | End: 2021-01-01

## 2021-01-01 RX ORDER — SCOPALAMINE 1 MG/3D
1 PATCH, EXTENDED RELEASE TRANSDERMAL ONCE
Refills: 0 | Status: DISCONTINUED | OUTPATIENT
Start: 2021-01-01 | End: 2021-01-01

## 2021-01-01 RX ORDER — CARVEDILOL PHOSPHATE 80 MG/1
6.25 CAPSULE, EXTENDED RELEASE ORAL EVERY 12 HOURS
Refills: 0 | Status: DISCONTINUED | OUTPATIENT
Start: 2021-01-01 | End: 2021-01-01

## 2021-01-01 RX ORDER — VANCOMYCIN HCL 1 G
1500 VIAL (EA) INTRAVENOUS EVERY 12 HOURS
Refills: 0 | Status: DISCONTINUED | OUTPATIENT
Start: 2021-01-01 | End: 2021-01-01

## 2021-01-01 RX ORDER — HYDROMORPHONE HYDROCHLORIDE 2 MG/ML
2.5 INJECTION INTRAMUSCULAR; INTRAVENOUS; SUBCUTANEOUS
Qty: 100 | Refills: 0 | Status: DISCONTINUED | OUTPATIENT
Start: 2021-01-01 | End: 2021-01-01

## 2021-01-01 RX ORDER — CALCIUM GLUCONATE 100 MG/ML
2 VIAL (ML) INTRAVENOUS ONCE
Refills: 0 | Status: COMPLETED | OUTPATIENT
Start: 2021-01-01 | End: 2021-01-01

## 2021-01-01 RX ADMIN — Medication 2: at 17:52

## 2021-01-01 RX ADMIN — PROPOFOL 5.08 MICROGRAM(S)/KG/MIN: 10 INJECTION, EMULSION INTRAVENOUS at 09:28

## 2021-01-01 RX ADMIN — HEPARIN SODIUM 13 UNIT(S)/HR: 5000 INJECTION INTRAVENOUS; SUBCUTANEOUS at 07:36

## 2021-01-01 RX ADMIN — PROPOFOL 2.54 MICROGRAM(S)/KG/MIN: 10 INJECTION, EMULSION INTRAVENOUS at 14:00

## 2021-01-01 RX ADMIN — CHLORHEXIDINE GLUCONATE 15 MILLILITER(S): 213 SOLUTION TOPICAL at 22:39

## 2021-01-01 RX ADMIN — FENTANYL CITRATE 50 MICROGRAM(S): 50 INJECTION INTRAVENOUS at 03:50

## 2021-01-01 RX ADMIN — CHLORHEXIDINE GLUCONATE 15 MILLILITER(S): 213 SOLUTION TOPICAL at 22:32

## 2021-01-01 RX ADMIN — PIPERACILLIN AND TAZOBACTAM 200 GRAM(S): 4; .5 INJECTION, POWDER, LYOPHILIZED, FOR SOLUTION INTRAVENOUS at 22:57

## 2021-01-01 RX ADMIN — CHLORHEXIDINE GLUCONATE 15 MILLILITER(S): 213 SOLUTION TOPICAL at 10:38

## 2021-01-01 RX ADMIN — Medication 3 MILLILITER(S): at 13:25

## 2021-01-01 RX ADMIN — CHLORHEXIDINE GLUCONATE 15 MILLILITER(S): 213 SOLUTION TOPICAL at 22:58

## 2021-01-01 RX ADMIN — MEROPENEM 100 MILLIGRAM(S): 1 INJECTION INTRAVENOUS at 13:17

## 2021-01-01 RX ADMIN — FENTANYL CITRATE 50 MICROGRAM(S): 50 INJECTION INTRAVENOUS at 19:28

## 2021-01-01 RX ADMIN — SODIUM CHLORIDE 150 MILLILITER(S): 9 INJECTION, SOLUTION INTRAVENOUS at 12:46

## 2021-01-01 RX ADMIN — PROPOFOL 2.54 MICROGRAM(S)/KG/MIN: 10 INJECTION, EMULSION INTRAVENOUS at 18:05

## 2021-01-01 RX ADMIN — LISINOPRIL 10 MILLIGRAM(S): 2.5 TABLET ORAL at 11:39

## 2021-01-01 RX ADMIN — PROPOFOL 5.08 MICROGRAM(S)/KG/MIN: 10 INJECTION, EMULSION INTRAVENOUS at 06:02

## 2021-01-01 RX ADMIN — Medication 2: at 11:12

## 2021-01-01 RX ADMIN — Medication 2: at 00:19

## 2021-01-01 RX ADMIN — FENTANYL CITRATE 50 MICROGRAM(S): 50 INJECTION INTRAVENOUS at 16:59

## 2021-01-01 RX ADMIN — DEXMEDETOMIDINE HYDROCHLORIDE IN 0.9% SODIUM CHLORIDE 4.23 MICROGRAM(S)/KG/HR: 4 INJECTION INTRAVENOUS at 19:10

## 2021-01-01 RX ADMIN — FENTANYL CITRATE 4.23 MICROGRAM(S)/KG/HR: 50 INJECTION INTRAVENOUS at 21:02

## 2021-01-01 RX ADMIN — CHLORHEXIDINE GLUCONATE 15 MILLILITER(S): 213 SOLUTION TOPICAL at 23:37

## 2021-01-01 RX ADMIN — MEROPENEM 100 MILLIGRAM(S): 1 INJECTION INTRAVENOUS at 22:15

## 2021-01-01 RX ADMIN — CHLORHEXIDINE GLUCONATE 15 MILLILITER(S): 213 SOLUTION TOPICAL at 21:59

## 2021-01-01 RX ADMIN — Medication 2: at 00:40

## 2021-01-01 RX ADMIN — SODIUM CHLORIDE 1000 MILLILITER(S): 9 INJECTION INTRAMUSCULAR; INTRAVENOUS; SUBCUTANEOUS at 13:12

## 2021-01-01 RX ADMIN — ENOXAPARIN SODIUM 40 MILLIGRAM(S): 100 INJECTION SUBCUTANEOUS at 21:56

## 2021-01-01 RX ADMIN — TOCILIZUMAB 100 MILLIGRAM(S): 20 INJECTION, SOLUTION, CONCENTRATE INTRAVENOUS at 19:59

## 2021-01-01 RX ADMIN — HEPARIN SODIUM 13 UNIT(S)/HR: 5000 INJECTION INTRAVENOUS; SUBCUTANEOUS at 15:43

## 2021-01-01 RX ADMIN — PIPERACILLIN AND TAZOBACTAM 200 GRAM(S): 4; .5 INJECTION, POWDER, LYOPHILIZED, FOR SOLUTION INTRAVENOUS at 11:09

## 2021-01-01 RX ADMIN — Medication 6 MILLIGRAM(S): at 12:57

## 2021-01-01 RX ADMIN — FENTANYL CITRATE 4.23 MICROGRAM(S)/KG/HR: 50 INJECTION INTRAVENOUS at 08:52

## 2021-01-01 RX ADMIN — MEROPENEM 100 MILLIGRAM(S): 1 INJECTION INTRAVENOUS at 22:32

## 2021-01-01 RX ADMIN — Medication 2: at 00:00

## 2021-01-01 RX ADMIN — Medication 3.17 MICROGRAM(S)/KG/MIN: at 06:00

## 2021-01-01 RX ADMIN — Medication 3 MILLILITER(S): at 06:31

## 2021-01-01 RX ADMIN — FENTANYL CITRATE 4.23 MICROGRAM(S)/KG/HR: 50 INJECTION INTRAVENOUS at 19:46

## 2021-01-01 RX ADMIN — SCOPALAMINE 1 PATCH: 1 PATCH, EXTENDED RELEASE TRANSDERMAL at 22:41

## 2021-01-01 RX ADMIN — CHLORHEXIDINE GLUCONATE 15 MILLILITER(S): 213 SOLUTION TOPICAL at 22:00

## 2021-01-01 RX ADMIN — Medication 37.5 MICROGRAM(S): at 21:04

## 2021-01-01 RX ADMIN — PROPOFOL 5.08 MICROGRAM(S)/KG/MIN: 10 INJECTION, EMULSION INTRAVENOUS at 22:34

## 2021-01-01 RX ADMIN — CARVEDILOL PHOSPHATE 6.25 MILLIGRAM(S): 80 CAPSULE, EXTENDED RELEASE ORAL at 11:23

## 2021-01-01 RX ADMIN — CHLORHEXIDINE GLUCONATE 15 MILLILITER(S): 213 SOLUTION TOPICAL at 12:50

## 2021-01-01 RX ADMIN — Medication 650 MILLIGRAM(S): at 07:31

## 2021-01-01 RX ADMIN — Medication 7.93 MICROGRAM(S)/KG/MIN: at 02:30

## 2021-01-01 RX ADMIN — HYDROMORPHONE HYDROCHLORIDE 4 MILLIGRAM(S): 2 INJECTION INTRAMUSCULAR; INTRAVENOUS; SUBCUTANEOUS at 11:49

## 2021-01-01 RX ADMIN — Medication 6 MILLIGRAM(S): at 12:27

## 2021-01-01 RX ADMIN — Medication 650 MILLIGRAM(S): at 14:30

## 2021-01-01 RX ADMIN — FENTANYL CITRATE 4.23 MICROGRAM(S)/KG/HR: 50 INJECTION INTRAVENOUS at 08:27

## 2021-01-01 RX ADMIN — HEPARIN SODIUM 13 UNIT(S)/HR: 5000 INJECTION INTRAVENOUS; SUBCUTANEOUS at 22:33

## 2021-01-01 RX ADMIN — ENOXAPARIN SODIUM 40 MILLIGRAM(S): 100 INJECTION SUBCUTANEOUS at 22:00

## 2021-01-01 RX ADMIN — PROPOFOL 2.54 MICROGRAM(S)/KG/MIN: 10 INJECTION, EMULSION INTRAVENOUS at 00:19

## 2021-01-01 RX ADMIN — MEROPENEM 100 MILLIGRAM(S): 1 INJECTION INTRAVENOUS at 13:07

## 2021-01-01 RX ADMIN — MEROPENEM 100 MILLIGRAM(S): 1 INJECTION INTRAVENOUS at 13:05

## 2021-01-01 RX ADMIN — PROPOFOL 5.08 MICROGRAM(S)/KG/MIN: 10 INJECTION, EMULSION INTRAVENOUS at 22:10

## 2021-01-01 RX ADMIN — Medication 40 MILLIGRAM(S): at 16:19

## 2021-01-01 RX ADMIN — Medication 3 MILLILITER(S): at 11:29

## 2021-01-01 RX ADMIN — PIPERACILLIN AND TAZOBACTAM 200 GRAM(S): 4; .5 INJECTION, POWDER, LYOPHILIZED, FOR SOLUTION INTRAVENOUS at 23:41

## 2021-01-01 RX ADMIN — Medication 3 MILLILITER(S): at 17:00

## 2021-01-01 RX ADMIN — CHLORHEXIDINE GLUCONATE 1 APPLICATION(S): 213 SOLUTION TOPICAL at 06:03

## 2021-01-01 RX ADMIN — CHLORHEXIDINE GLUCONATE 1 APPLICATION(S): 213 SOLUTION TOPICAL at 06:20

## 2021-01-01 RX ADMIN — CHLORHEXIDINE GLUCONATE 1 APPLICATION(S): 213 SOLUTION TOPICAL at 06:04

## 2021-01-01 RX ADMIN — CHLORHEXIDINE GLUCONATE 15 MILLILITER(S): 213 SOLUTION TOPICAL at 21:11

## 2021-01-01 RX ADMIN — CHLORHEXIDINE GLUCONATE 1 APPLICATION(S): 213 SOLUTION TOPICAL at 07:04

## 2021-01-01 RX ADMIN — Medication 40 MILLIGRAM(S): at 09:18

## 2021-01-01 RX ADMIN — PROPOFOL 2.54 MICROGRAM(S)/KG/MIN: 10 INJECTION, EMULSION INTRAVENOUS at 13:54

## 2021-01-01 RX ADMIN — PIPERACILLIN AND TAZOBACTAM 200 GRAM(S): 4; .5 INJECTION, POWDER, LYOPHILIZED, FOR SOLUTION INTRAVENOUS at 17:42

## 2021-01-01 RX ADMIN — DEXMEDETOMIDINE HYDROCHLORIDE IN 0.9% SODIUM CHLORIDE 4.23 MICROGRAM(S)/KG/HR: 4 INJECTION INTRAVENOUS at 18:40

## 2021-01-01 RX ADMIN — PIPERACILLIN AND TAZOBACTAM 200 GRAM(S): 4; .5 INJECTION, POWDER, LYOPHILIZED, FOR SOLUTION INTRAVENOUS at 19:59

## 2021-01-01 RX ADMIN — PROPOFOL 5.08 MICROGRAM(S)/KG/MIN: 10 INJECTION, EMULSION INTRAVENOUS at 18:59

## 2021-01-01 RX ADMIN — SENNA PLUS 2 TABLET(S): 8.6 TABLET ORAL at 22:32

## 2021-01-01 RX ADMIN — PIPERACILLIN AND TAZOBACTAM 200 GRAM(S): 4; .5 INJECTION, POWDER, LYOPHILIZED, FOR SOLUTION INTRAVENOUS at 00:26

## 2021-01-01 RX ADMIN — DEXMEDETOMIDINE HYDROCHLORIDE IN 0.9% SODIUM CHLORIDE 4.23 MICROGRAM(S)/KG/HR: 4 INJECTION INTRAVENOUS at 15:42

## 2021-01-01 RX ADMIN — PROPOFOL 2.54 MICROGRAM(S)/KG/MIN: 10 INJECTION, EMULSION INTRAVENOUS at 09:14

## 2021-01-01 RX ADMIN — Medication 3 MILLILITER(S): at 06:38

## 2021-01-01 RX ADMIN — PIPERACILLIN AND TAZOBACTAM 200 GRAM(S): 4; .5 INJECTION, POWDER, LYOPHILIZED, FOR SOLUTION INTRAVENOUS at 11:03

## 2021-01-01 RX ADMIN — PROPOFOL 5.08 MICROGRAM(S)/KG/MIN: 10 INJECTION, EMULSION INTRAVENOUS at 21:13

## 2021-01-01 RX ADMIN — ENOXAPARIN SODIUM 40 MILLIGRAM(S): 100 INJECTION SUBCUTANEOUS at 21:03

## 2021-01-01 RX ADMIN — PANTOPRAZOLE SODIUM 40 MILLIGRAM(S): 20 TABLET, DELAYED RELEASE ORAL at 00:26

## 2021-01-01 RX ADMIN — PROPOFOL 5.08 MICROGRAM(S)/KG/MIN: 10 INJECTION, EMULSION INTRAVENOUS at 23:58

## 2021-01-01 RX ADMIN — SENNA PLUS 2 TABLET(S): 8.6 TABLET ORAL at 22:00

## 2021-01-01 RX ADMIN — PROPOFOL 2.54 MICROGRAM(S)/KG/MIN: 10 INJECTION, EMULSION INTRAVENOUS at 23:42

## 2021-01-01 RX ADMIN — CHLORHEXIDINE GLUCONATE 15 MILLILITER(S): 213 SOLUTION TOPICAL at 11:29

## 2021-01-01 RX ADMIN — MEROPENEM 100 MILLIGRAM(S): 1 INJECTION INTRAVENOUS at 13:54

## 2021-01-01 RX ADMIN — PROPOFOL 5.08 MICROGRAM(S)/KG/MIN: 10 INJECTION, EMULSION INTRAVENOUS at 15:33

## 2021-01-01 RX ADMIN — FENTANYL CITRATE 4.23 MICROGRAM(S)/KG/HR: 50 INJECTION INTRAVENOUS at 04:05

## 2021-01-01 RX ADMIN — PANTOPRAZOLE SODIUM 40 MILLIGRAM(S): 20 TABLET, DELAYED RELEASE ORAL at 22:15

## 2021-01-01 RX ADMIN — SODIUM ZIRCONIUM CYCLOSILICATE 10 GRAM(S): 10 POWDER, FOR SUSPENSION ORAL at 09:47

## 2021-01-01 RX ADMIN — Medication 37.5 MICROGRAM(S): at 22:00

## 2021-01-01 RX ADMIN — CHLORHEXIDINE GLUCONATE 1 APPLICATION(S): 213 SOLUTION TOPICAL at 06:19

## 2021-01-01 RX ADMIN — CHLORHEXIDINE GLUCONATE 15 MILLILITER(S): 213 SOLUTION TOPICAL at 09:47

## 2021-01-01 RX ADMIN — FENTANYL CITRATE 50 MICROGRAM(S): 50 INJECTION INTRAVENOUS at 16:37

## 2021-01-01 RX ADMIN — Medication 37.5 MICROGRAM(S): at 22:28

## 2021-01-01 RX ADMIN — ENOXAPARIN SODIUM 40 MILLIGRAM(S): 100 INJECTION SUBCUTANEOUS at 21:36

## 2021-01-01 RX ADMIN — FENTANYL CITRATE 50 MICROGRAM(S): 50 INJECTION INTRAVENOUS at 02:10

## 2021-01-01 RX ADMIN — Medication 6 MILLIGRAM(S): at 13:31

## 2021-01-01 RX ADMIN — Medication 2: at 00:37

## 2021-01-01 RX ADMIN — Medication 2: at 17:41

## 2021-01-01 RX ADMIN — Medication 2: at 17:23

## 2021-01-01 RX ADMIN — CHLORHEXIDINE GLUCONATE 15 MILLILITER(S): 213 SOLUTION TOPICAL at 21:37

## 2021-01-01 RX ADMIN — FENTANYL CITRATE 50 MICROGRAM(S): 50 INJECTION INTRAVENOUS at 10:41

## 2021-01-01 RX ADMIN — Medication 650 MILLIGRAM(S): at 13:06

## 2021-01-01 RX ADMIN — CHLORHEXIDINE GLUCONATE 1 APPLICATION(S): 213 SOLUTION TOPICAL at 07:33

## 2021-01-01 RX ADMIN — SCOPALAMINE 1 PATCH: 1 PATCH, EXTENDED RELEASE TRANSDERMAL at 19:29

## 2021-01-01 RX ADMIN — CHLORHEXIDINE GLUCONATE 15 MILLILITER(S): 213 SOLUTION TOPICAL at 11:00

## 2021-01-01 RX ADMIN — PANTOPRAZOLE SODIUM 40 MILLIGRAM(S): 20 TABLET, DELAYED RELEASE ORAL at 22:27

## 2021-01-01 RX ADMIN — Medication 6 MILLIGRAM(S): at 11:07

## 2021-01-01 RX ADMIN — Medication 37.5 MICROGRAM(S): at 21:37

## 2021-01-01 RX ADMIN — CHLORHEXIDINE GLUCONATE 15 MILLILITER(S): 213 SOLUTION TOPICAL at 05:54

## 2021-01-01 RX ADMIN — PIPERACILLIN AND TAZOBACTAM 200 GRAM(S): 4; .5 INJECTION, POWDER, LYOPHILIZED, FOR SOLUTION INTRAVENOUS at 11:24

## 2021-01-01 RX ADMIN — PROPOFOL 2.54 MICROGRAM(S)/KG/MIN: 10 INJECTION, EMULSION INTRAVENOUS at 10:59

## 2021-01-01 RX ADMIN — ENOXAPARIN SODIUM 40 MILLIGRAM(S): 100 INJECTION SUBCUTANEOUS at 21:40

## 2021-01-01 RX ADMIN — Medication 650 MILLIGRAM(S): at 11:23

## 2021-01-01 RX ADMIN — PROPOFOL 5.08 MICROGRAM(S)/KG/MIN: 10 INJECTION, EMULSION INTRAVENOUS at 11:07

## 2021-01-01 RX ADMIN — PIPERACILLIN AND TAZOBACTAM 200 GRAM(S): 4; .5 INJECTION, POWDER, LYOPHILIZED, FOR SOLUTION INTRAVENOUS at 11:39

## 2021-01-01 RX ADMIN — MIDAZOLAM HYDROCHLORIDE 5 MILLIGRAM(S): 1 INJECTION, SOLUTION INTRAMUSCULAR; INTRAVENOUS at 13:29

## 2021-01-01 RX ADMIN — Medication 37.5 MICROGRAM(S): at 21:56

## 2021-01-01 RX ADMIN — MEROPENEM 100 MILLIGRAM(S): 1 INJECTION INTRAVENOUS at 05:15

## 2021-01-01 RX ADMIN — Medication 10 MILLIGRAM(S): at 07:32

## 2021-01-01 RX ADMIN — PROPOFOL 5.08 MICROGRAM(S)/KG/MIN: 10 INJECTION, EMULSION INTRAVENOUS at 05:44

## 2021-01-01 RX ADMIN — FENTANYL CITRATE 4.23 MICROGRAM(S)/KG/HR: 50 INJECTION INTRAVENOUS at 21:32

## 2021-01-01 RX ADMIN — CHLORHEXIDINE GLUCONATE 15 MILLILITER(S): 213 SOLUTION TOPICAL at 09:33

## 2021-01-01 RX ADMIN — Medication 3 MILLILITER(S): at 01:05

## 2021-01-01 RX ADMIN — CHLORHEXIDINE GLUCONATE 15 MILLILITER(S): 213 SOLUTION TOPICAL at 09:21

## 2021-01-01 RX ADMIN — Medication 2: at 18:01

## 2021-01-01 RX ADMIN — Medication 650 MILLIGRAM(S): at 08:34

## 2021-01-01 RX ADMIN — FENTANYL CITRATE 50 MICROGRAM(S): 50 INJECTION INTRAVENOUS at 02:47

## 2021-01-01 RX ADMIN — SODIUM ZIRCONIUM CYCLOSILICATE 10 GRAM(S): 10 POWDER, FOR SUSPENSION ORAL at 01:51

## 2021-01-01 RX ADMIN — Medication 2: at 17:27

## 2021-01-01 RX ADMIN — MIDAZOLAM HYDROCHLORIDE 2 MILLIGRAM(S): 1 INJECTION, SOLUTION INTRAMUSCULAR; INTRAVENOUS at 13:25

## 2021-01-01 RX ADMIN — PIPERACILLIN AND TAZOBACTAM 200 GRAM(S): 4; .5 INJECTION, POWDER, LYOPHILIZED, FOR SOLUTION INTRAVENOUS at 12:57

## 2021-01-01 RX ADMIN — SODIUM CHLORIDE 150 MILLILITER(S): 9 INJECTION, SOLUTION INTRAVENOUS at 10:56

## 2021-01-01 RX ADMIN — Medication 300 MILLIGRAM(S): at 03:56

## 2021-01-01 RX ADMIN — MEROPENEM 100 MILLIGRAM(S): 1 INJECTION INTRAVENOUS at 13:27

## 2021-01-01 RX ADMIN — FENTANYL CITRATE 4.23 MICROGRAM(S)/KG/HR: 50 INJECTION INTRAVENOUS at 23:12

## 2021-01-01 RX ADMIN — PROPOFOL 2.54 MICROGRAM(S)/KG/MIN: 10 INJECTION, EMULSION INTRAVENOUS at 14:13

## 2021-01-01 RX ADMIN — FENTANYL CITRATE 50 MICROGRAM(S): 50 INJECTION INTRAVENOUS at 00:59

## 2021-01-01 RX ADMIN — SODIUM CHLORIDE 150 MILLILITER(S): 9 INJECTION, SOLUTION INTRAVENOUS at 00:27

## 2021-01-01 RX ADMIN — MEROPENEM 100 MILLIGRAM(S): 1 INJECTION INTRAVENOUS at 13:58

## 2021-01-01 RX ADMIN — Medication 6 MILLIGRAM(S): at 11:04

## 2021-01-01 RX ADMIN — CARVEDILOL PHOSPHATE 6.25 MILLIGRAM(S): 80 CAPSULE, EXTENDED RELEASE ORAL at 22:01

## 2021-01-01 RX ADMIN — Medication 166.67 MILLIGRAM(S): at 12:19

## 2021-01-01 RX ADMIN — CHLORHEXIDINE GLUCONATE 15 MILLILITER(S): 213 SOLUTION TOPICAL at 09:14

## 2021-01-01 RX ADMIN — FENTANYL CITRATE 4.23 MICROGRAM(S)/KG/HR: 50 INJECTION INTRAVENOUS at 13:51

## 2021-01-01 RX ADMIN — PANTOPRAZOLE SODIUM 40 MILLIGRAM(S): 20 TABLET, DELAYED RELEASE ORAL at 01:03

## 2021-01-01 RX ADMIN — PROPOFOL 2.54 MICROGRAM(S)/KG/MIN: 10 INJECTION, EMULSION INTRAVENOUS at 18:38

## 2021-01-01 RX ADMIN — Medication 37.5 MICROGRAM(S): at 21:11

## 2021-01-01 RX ADMIN — FENTANYL CITRATE 4.23 MICROGRAM(S)/KG/HR: 50 INJECTION INTRAVENOUS at 01:07

## 2021-01-01 RX ADMIN — PANTOPRAZOLE SODIUM 40 MILLIGRAM(S): 20 TABLET, DELAYED RELEASE ORAL at 21:36

## 2021-01-01 RX ADMIN — Medication 5 MILLIGRAM(S): at 08:59

## 2021-01-01 RX ADMIN — Medication 5 MILLIGRAM(S): at 15:42

## 2021-01-01 RX ADMIN — MIDAZOLAM HYDROCHLORIDE 2 MILLIGRAM(S): 1 INJECTION, SOLUTION INTRAMUSCULAR; INTRAVENOUS at 11:32

## 2021-01-01 RX ADMIN — MEROPENEM 100 MILLIGRAM(S): 1 INJECTION INTRAVENOUS at 22:00

## 2021-01-01 RX ADMIN — HYDROMORPHONE HYDROCHLORIDE 2 MG/HR: 2 INJECTION INTRAMUSCULAR; INTRAVENOUS; SUBCUTANEOUS at 11:58

## 2021-01-01 RX ADMIN — CHLORHEXIDINE GLUCONATE 15 MILLILITER(S): 213 SOLUTION TOPICAL at 09:19

## 2021-01-01 RX ADMIN — PIPERACILLIN AND TAZOBACTAM 200 GRAM(S): 4; .5 INJECTION, POWDER, LYOPHILIZED, FOR SOLUTION INTRAVENOUS at 05:19

## 2021-01-01 RX ADMIN — CHLORHEXIDINE GLUCONATE 1 APPLICATION(S): 213 SOLUTION TOPICAL at 05:22

## 2021-01-01 RX ADMIN — MEROPENEM 100 MILLIGRAM(S): 1 INJECTION INTRAVENOUS at 21:36

## 2021-01-01 RX ADMIN — FENTANYL CITRATE 50 MICROGRAM(S): 50 INJECTION INTRAVENOUS at 03:20

## 2021-01-01 RX ADMIN — PANTOPRAZOLE SODIUM 40 MILLIGRAM(S): 20 TABLET, DELAYED RELEASE ORAL at 23:40

## 2021-01-01 RX ADMIN — CHLORHEXIDINE GLUCONATE 15 MILLILITER(S): 213 SOLUTION TOPICAL at 17:49

## 2021-01-01 RX ADMIN — SCOPALAMINE 1 PATCH: 1 PATCH, EXTENDED RELEASE TRANSDERMAL at 07:23

## 2021-01-01 RX ADMIN — PROPOFOL 2.54 MICROGRAM(S)/KG/MIN: 10 INJECTION, EMULSION INTRAVENOUS at 06:00

## 2021-01-01 RX ADMIN — FENTANYL CITRATE 4.23 MICROGRAM(S)/KG/HR: 50 INJECTION INTRAVENOUS at 04:03

## 2021-01-01 RX ADMIN — Medication 300 MILLIGRAM(S): at 15:43

## 2021-01-01 RX ADMIN — Medication 650 MILLIGRAM(S): at 16:15

## 2021-01-01 RX ADMIN — Medication 6 MILLIGRAM(S): at 12:51

## 2021-01-01 RX ADMIN — Medication 650 MILLIGRAM(S): at 19:36

## 2021-01-01 RX ADMIN — ENOXAPARIN SODIUM 40 MILLIGRAM(S): 100 INJECTION SUBCUTANEOUS at 21:11

## 2021-01-01 RX ADMIN — PIPERACILLIN AND TAZOBACTAM 200 GRAM(S): 4; .5 INJECTION, POWDER, LYOPHILIZED, FOR SOLUTION INTRAVENOUS at 17:45

## 2021-01-01 RX ADMIN — Medication 37.5 MICROGRAM(S): at 22:02

## 2021-01-01 RX ADMIN — MIDAZOLAM HYDROCHLORIDE 5 MILLIGRAM(S): 1 INJECTION, SOLUTION INTRAMUSCULAR; INTRAVENOUS at 14:07

## 2021-01-01 RX ADMIN — DEXMEDETOMIDINE HYDROCHLORIDE IN 0.9% SODIUM CHLORIDE 4.23 MICROGRAM(S)/KG/HR: 4 INJECTION INTRAVENOUS at 06:03

## 2021-01-01 RX ADMIN — Medication 37.5 MICROGRAM(S): at 22:19

## 2021-01-01 RX ADMIN — Medication 650 MILLIGRAM(S): at 12:20

## 2021-01-01 RX ADMIN — Medication 166.67 MILLIGRAM(S): at 12:27

## 2021-01-01 RX ADMIN — FENTANYL CITRATE 50 MICROGRAM(S): 50 INJECTION INTRAVENOUS at 19:59

## 2021-01-01 RX ADMIN — Medication 650 MILLIGRAM(S): at 22:54

## 2021-01-01 RX ADMIN — PIPERACILLIN AND TAZOBACTAM 200 GRAM(S): 4; .5 INJECTION, POWDER, LYOPHILIZED, FOR SOLUTION INTRAVENOUS at 00:59

## 2021-01-01 RX ADMIN — Medication 650 MILLIGRAM(S): at 14:00

## 2021-01-01 RX ADMIN — PROPOFOL 5.08 MICROGRAM(S)/KG/MIN: 10 INJECTION, EMULSION INTRAVENOUS at 06:28

## 2021-01-01 RX ADMIN — Medication 3.97 MICROGRAM(S)/KG/MIN: at 18:59

## 2021-01-01 RX ADMIN — DEXMEDETOMIDINE HYDROCHLORIDE IN 0.9% SODIUM CHLORIDE 4.23 MICROGRAM(S)/KG/HR: 4 INJECTION INTRAVENOUS at 01:06

## 2021-01-01 RX ADMIN — PROPOFOL 5.08 MICROGRAM(S)/KG/MIN: 10 INJECTION, EMULSION INTRAVENOUS at 01:47

## 2021-01-01 RX ADMIN — PANTOPRAZOLE SODIUM 40 MILLIGRAM(S): 20 TABLET, DELAYED RELEASE ORAL at 22:20

## 2021-01-01 RX ADMIN — FENTANYL CITRATE 100 MICROGRAM(S): 50 INJECTION INTRAVENOUS at 12:45

## 2021-01-01 RX ADMIN — Medication 40 MILLIGRAM(S): at 17:49

## 2021-01-01 RX ADMIN — SENNA PLUS 2 TABLET(S): 8.6 TABLET ORAL at 21:37

## 2021-01-01 RX ADMIN — FENTANYL CITRATE 50 MICROGRAM(S): 50 INJECTION INTRAVENOUS at 15:30

## 2021-01-01 RX ADMIN — Medication 20 MILLIEQUIVALENT(S): at 07:11

## 2021-01-01 RX ADMIN — Medication 3.17 MICROGRAM(S)/KG/MIN: at 01:07

## 2021-01-01 RX ADMIN — CHLORHEXIDINE GLUCONATE 15 MILLILITER(S): 213 SOLUTION TOPICAL at 22:15

## 2021-01-01 RX ADMIN — PIPERACILLIN AND TAZOBACTAM 200 GRAM(S): 4; .5 INJECTION, POWDER, LYOPHILIZED, FOR SOLUTION INTRAVENOUS at 07:36

## 2021-01-01 RX ADMIN — PROPOFOL 5.08 MICROGRAM(S)/KG/MIN: 10 INJECTION, EMULSION INTRAVENOUS at 13:51

## 2021-01-01 RX ADMIN — SCOPALAMINE 1 PATCH: 1 PATCH, EXTENDED RELEASE TRANSDERMAL at 06:46

## 2021-01-01 RX ADMIN — PROPOFOL 5.08 MICROGRAM(S)/KG/MIN: 10 INJECTION, EMULSION INTRAVENOUS at 19:00

## 2021-01-01 RX ADMIN — PROPOFOL 5.08 MICROGRAM(S)/KG/MIN: 10 INJECTION, EMULSION INTRAVENOUS at 21:40

## 2021-01-01 RX ADMIN — VASOPRESSIN 2.4 UNIT(S)/MIN: 20 INJECTION INTRAVENOUS at 09:24

## 2021-01-01 RX ADMIN — Medication 650 MILLIGRAM(S): at 23:00

## 2021-01-01 RX ADMIN — CISATRACURIUM BESYLATE 10 MILLIGRAM(S): 2 INJECTION INTRAVENOUS at 11:31

## 2021-01-01 RX ADMIN — FENTANYL CITRATE 50 MICROGRAM(S): 50 INJECTION INTRAVENOUS at 16:49

## 2021-01-01 RX ADMIN — PROPOFOL 2.54 MICROGRAM(S)/KG/MIN: 10 INJECTION, EMULSION INTRAVENOUS at 19:45

## 2021-01-01 RX ADMIN — PIPERACILLIN AND TAZOBACTAM 200 GRAM(S): 4; .5 INJECTION, POWDER, LYOPHILIZED, FOR SOLUTION INTRAVENOUS at 06:04

## 2021-01-01 RX ADMIN — FENTANYL CITRATE 50 MICROGRAM(S): 50 INJECTION INTRAVENOUS at 05:20

## 2021-01-01 RX ADMIN — PIPERACILLIN AND TAZOBACTAM 200 GRAM(S): 4; .5 INJECTION, POWDER, LYOPHILIZED, FOR SOLUTION INTRAVENOUS at 06:18

## 2021-01-01 RX ADMIN — PANTOPRAZOLE SODIUM 40 MILLIGRAM(S): 20 TABLET, DELAYED RELEASE ORAL at 22:57

## 2021-01-01 RX ADMIN — FENTANYL CITRATE 50 MICROGRAM(S): 50 INJECTION INTRAVENOUS at 05:06

## 2021-01-01 RX ADMIN — Medication 2: at 18:00

## 2021-01-01 RX ADMIN — FENTANYL CITRATE 50 MICROGRAM(S): 50 INJECTION INTRAVENOUS at 16:22

## 2021-01-01 RX ADMIN — PROPOFOL 5.08 MICROGRAM(S)/KG/MIN: 10 INJECTION, EMULSION INTRAVENOUS at 02:14

## 2021-01-01 RX ADMIN — FENTANYL CITRATE 50 MICROGRAM(S): 50 INJECTION INTRAVENOUS at 22:50

## 2021-01-01 RX ADMIN — PANTOPRAZOLE SODIUM 40 MILLIGRAM(S): 20 TABLET, DELAYED RELEASE ORAL at 23:11

## 2021-01-01 RX ADMIN — MEROPENEM 100 MILLIGRAM(S): 1 INJECTION INTRAVENOUS at 07:05

## 2021-01-01 RX ADMIN — SENNA PLUS 2 TABLET(S): 8.6 TABLET ORAL at 22:40

## 2021-01-01 RX ADMIN — Medication 37.5 MICROGRAM(S): at 21:42

## 2021-01-01 RX ADMIN — PROPOFOL 2.54 MICROGRAM(S)/KG/MIN: 10 INJECTION, EMULSION INTRAVENOUS at 15:51

## 2021-01-01 RX ADMIN — PROPOFOL 2.54 MICROGRAM(S)/KG/MIN: 10 INJECTION, EMULSION INTRAVENOUS at 21:40

## 2021-01-01 RX ADMIN — HEPARIN SODIUM 13 UNIT(S)/HR: 5000 INJECTION INTRAVENOUS; SUBCUTANEOUS at 06:25

## 2021-01-01 RX ADMIN — Medication 3 MILLILITER(S): at 01:00

## 2021-01-01 RX ADMIN — HEPARIN SODIUM 15 UNIT(S)/HR: 5000 INJECTION INTRAVENOUS; SUBCUTANEOUS at 13:56

## 2021-01-01 RX ADMIN — Medication 4: at 12:49

## 2021-01-01 RX ADMIN — FENTANYL CITRATE 100 MICROGRAM(S): 50 INJECTION INTRAVENOUS at 15:24

## 2021-01-01 RX ADMIN — PIPERACILLIN AND TAZOBACTAM 200 GRAM(S): 4; .5 INJECTION, POWDER, LYOPHILIZED, FOR SOLUTION INTRAVENOUS at 17:14

## 2021-01-01 RX ADMIN — PIPERACILLIN AND TAZOBACTAM 200 GRAM(S): 4; .5 INJECTION, POWDER, LYOPHILIZED, FOR SOLUTION INTRAVENOUS at 07:18

## 2021-01-01 RX ADMIN — PANTOPRAZOLE SODIUM 40 MILLIGRAM(S): 20 TABLET, DELAYED RELEASE ORAL at 22:32

## 2021-01-01 RX ADMIN — CHLORHEXIDINE GLUCONATE 15 MILLILITER(S): 213 SOLUTION TOPICAL at 21:56

## 2021-01-01 RX ADMIN — Medication 37.5 MICROGRAM(S): at 22:53

## 2021-01-01 RX ADMIN — Medication 2: at 18:28

## 2021-01-01 RX ADMIN — PIPERACILLIN AND TAZOBACTAM 200 GRAM(S): 4; .5 INJECTION, POWDER, LYOPHILIZED, FOR SOLUTION INTRAVENOUS at 17:09

## 2021-01-01 RX ADMIN — PIPERACILLIN AND TAZOBACTAM 200 GRAM(S): 4; .5 INJECTION, POWDER, LYOPHILIZED, FOR SOLUTION INTRAVENOUS at 23:12

## 2021-01-01 RX ADMIN — Medication 3.97 MICROGRAM(S)/KG/MIN: at 08:27

## 2021-01-01 RX ADMIN — FENTANYL CITRATE 4.23 MICROGRAM(S)/KG/HR: 50 INJECTION INTRAVENOUS at 18:59

## 2021-01-01 RX ADMIN — Medication 3.17 MICROGRAM(S)/KG/MIN: at 14:09

## 2021-01-01 RX ADMIN — CHLORHEXIDINE GLUCONATE 15 MILLILITER(S): 213 SOLUTION TOPICAL at 10:09

## 2021-01-01 RX ADMIN — MIDAZOLAM HYDROCHLORIDE 4 MILLIGRAM(S): 1 INJECTION, SOLUTION INTRAMUSCULAR; INTRAVENOUS at 19:00

## 2021-01-01 RX ADMIN — FENTANYL CITRATE 4.23 MICROGRAM(S)/KG/HR: 50 INJECTION INTRAVENOUS at 21:10

## 2021-01-01 RX ADMIN — PANTOPRAZOLE SODIUM 40 MILLIGRAM(S): 20 TABLET, DELAYED RELEASE ORAL at 22:40

## 2021-01-01 RX ADMIN — PIPERACILLIN AND TAZOBACTAM 200 GRAM(S): 4; .5 INJECTION, POWDER, LYOPHILIZED, FOR SOLUTION INTRAVENOUS at 01:06

## 2021-01-01 RX ADMIN — Medication 5 MILLIGRAM(S): at 22:14

## 2021-01-01 RX ADMIN — DEXMEDETOMIDINE HYDROCHLORIDE IN 0.9% SODIUM CHLORIDE 4.23 MICROGRAM(S)/KG/HR: 4 INJECTION INTRAVENOUS at 12:00

## 2021-01-01 RX ADMIN — FENTANYL CITRATE 50 MICROGRAM(S): 50 INJECTION INTRAVENOUS at 19:29

## 2021-01-01 RX ADMIN — CHLORHEXIDINE GLUCONATE 1 APPLICATION(S): 213 SOLUTION TOPICAL at 05:15

## 2021-01-01 RX ADMIN — CHLORHEXIDINE GLUCONATE 15 MILLILITER(S): 213 SOLUTION TOPICAL at 11:07

## 2021-01-01 RX ADMIN — Medication 3.97 MICROGRAM(S)/KG/MIN: at 21:29

## 2021-01-01 RX ADMIN — FENTANYL CITRATE 50 MICROGRAM(S): 50 INJECTION INTRAVENOUS at 11:54

## 2021-01-01 RX ADMIN — PIPERACILLIN AND TAZOBACTAM 200 GRAM(S): 4; .5 INJECTION, POWDER, LYOPHILIZED, FOR SOLUTION INTRAVENOUS at 17:50

## 2021-01-01 RX ADMIN — Medication 50 MILLILITER(S): at 11:39

## 2021-01-01 RX ADMIN — MEROPENEM 100 MILLIGRAM(S): 1 INJECTION INTRAVENOUS at 06:03

## 2021-01-01 RX ADMIN — PROPOFOL 2.54 MICROGRAM(S)/KG/MIN: 10 INJECTION, EMULSION INTRAVENOUS at 01:51

## 2021-01-01 RX ADMIN — SENNA PLUS 2 TABLET(S): 8.6 TABLET ORAL at 22:15

## 2021-01-01 RX ADMIN — ETOMIDATE 30 MILLIGRAM(S): 2 INJECTION INTRAVENOUS at 12:40

## 2021-01-01 RX ADMIN — FENTANYL CITRATE 4.23 MICROGRAM(S)/KG/HR: 50 INJECTION INTRAVENOUS at 18:23

## 2021-01-01 RX ADMIN — Medication 6 MILLIGRAM(S): at 11:23

## 2021-01-01 RX ADMIN — DEXMEDETOMIDINE HYDROCHLORIDE IN 0.9% SODIUM CHLORIDE 4.23 MICROGRAM(S)/KG/HR: 4 INJECTION INTRAVENOUS at 01:00

## 2021-01-01 RX ADMIN — PROPOFOL 2.54 MICROGRAM(S)/KG/MIN: 10 INJECTION, EMULSION INTRAVENOUS at 03:54

## 2021-01-01 RX ADMIN — Medication 3.17 MICROGRAM(S)/KG/MIN: at 12:27

## 2021-01-01 RX ADMIN — Medication 200 GRAM(S): at 11:40

## 2021-01-01 RX ADMIN — Medication 10 MILLIGRAM(S): at 22:46

## 2021-01-01 RX ADMIN — FENTANYL CITRATE 50 MICROGRAM(S): 50 INJECTION INTRAVENOUS at 10:25

## 2021-01-01 RX ADMIN — CHLORHEXIDINE GLUCONATE 1 APPLICATION(S): 213 SOLUTION TOPICAL at 05:45

## 2021-01-01 RX ADMIN — CHLORHEXIDINE GLUCONATE 1 APPLICATION(S): 213 SOLUTION TOPICAL at 06:01

## 2021-01-01 RX ADMIN — Medication 6 MILLIGRAM(S): at 11:42

## 2021-01-01 RX ADMIN — HEPARIN SODIUM 13 UNIT(S)/HR: 5000 INJECTION INTRAVENOUS; SUBCUTANEOUS at 14:13

## 2021-01-01 RX ADMIN — Medication 7.93 MICROGRAM(S)/KG/MIN: at 13:52

## 2021-01-01 RX ADMIN — MEROPENEM 100 MILLIGRAM(S): 1 INJECTION INTRAVENOUS at 06:10

## 2021-01-01 RX ADMIN — Medication 3 MILLILITER(S): at 23:48

## 2021-01-01 RX ADMIN — Medication 2: at 23:40

## 2021-01-01 RX ADMIN — CHLORHEXIDINE GLUCONATE 1 APPLICATION(S): 213 SOLUTION TOPICAL at 06:18

## 2021-01-01 RX ADMIN — ENOXAPARIN SODIUM 40 MILLIGRAM(S): 100 INJECTION SUBCUTANEOUS at 22:27

## 2021-01-01 RX ADMIN — Medication 3 MILLILITER(S): at 17:27

## 2021-01-01 RX ADMIN — PIPERACILLIN AND TAZOBACTAM 200 GRAM(S): 4; .5 INJECTION, POWDER, LYOPHILIZED, FOR SOLUTION INTRAVENOUS at 14:10

## 2021-01-01 RX ADMIN — PIPERACILLIN AND TAZOBACTAM 200 GRAM(S): 4; .5 INJECTION, POWDER, LYOPHILIZED, FOR SOLUTION INTRAVENOUS at 12:50

## 2021-01-01 RX ADMIN — PIPERACILLIN AND TAZOBACTAM 200 GRAM(S): 4; .5 INJECTION, POWDER, LYOPHILIZED, FOR SOLUTION INTRAVENOUS at 11:42

## 2021-01-01 RX ADMIN — SODIUM CHLORIDE 1000 MILLILITER(S): 9 INJECTION INTRAMUSCULAR; INTRAVENOUS; SUBCUTANEOUS at 13:20

## 2021-01-01 RX ADMIN — Medication 2: at 11:59

## 2021-01-01 RX ADMIN — CHLORHEXIDINE GLUCONATE 15 MILLILITER(S): 213 SOLUTION TOPICAL at 21:40

## 2021-01-01 RX ADMIN — FENTANYL CITRATE 4.23 MICROGRAM(S)/KG/HR: 50 INJECTION INTRAVENOUS at 21:49

## 2021-01-01 RX ADMIN — CHLORHEXIDINE GLUCONATE 15 MILLILITER(S): 213 SOLUTION TOPICAL at 11:23

## 2021-01-01 RX ADMIN — PROPOFOL 5.08 MICROGRAM(S)/KG/MIN: 10 INJECTION, EMULSION INTRAVENOUS at 18:03

## 2021-01-01 RX ADMIN — FENTANYL CITRATE 4.23 MICROGRAM(S)/KG/HR: 50 INJECTION INTRAVENOUS at 10:25

## 2021-01-01 RX ADMIN — Medication 37.5 MICROGRAM(S): at 00:59

## 2021-01-01 RX ADMIN — VASOPRESSIN 2.4 UNIT(S)/MIN: 20 INJECTION INTRAVENOUS at 19:00

## 2021-01-01 RX ADMIN — PROPOFOL 5.08 MICROGRAM(S)/KG/MIN: 10 INJECTION, EMULSION INTRAVENOUS at 05:20

## 2021-01-01 RX ADMIN — FENTANYL CITRATE 50 MICROGRAM(S): 50 INJECTION INTRAVENOUS at 15:03

## 2021-01-01 RX ADMIN — Medication 6 MILLIGRAM(S): at 11:39

## 2021-01-01 RX ADMIN — VASOPRESSIN 2.4 UNIT(S)/MIN: 20 INJECTION INTRAVENOUS at 16:00

## 2021-01-01 RX ADMIN — CHLORHEXIDINE GLUCONATE 1 APPLICATION(S): 213 SOLUTION TOPICAL at 05:11

## 2021-01-01 RX ADMIN — SCOPALAMINE 1 PATCH: 1 PATCH, EXTENDED RELEASE TRANSDERMAL at 17:50

## 2021-01-01 RX ADMIN — Medication 3.17 MICROGRAM(S)/KG/MIN: at 18:05

## 2021-01-01 RX ADMIN — PROPOFOL 5.08 MICROGRAM(S)/KG/MIN: 10 INJECTION, EMULSION INTRAVENOUS at 10:54

## 2021-01-01 RX ADMIN — PIPERACILLIN AND TAZOBACTAM 200 GRAM(S): 4; .5 INJECTION, POWDER, LYOPHILIZED, FOR SOLUTION INTRAVENOUS at 06:00

## 2021-01-01 RX ADMIN — Medication 37.5 MICROGRAM(S): at 23:28

## 2021-01-01 RX ADMIN — Medication 650 MILLIGRAM(S): at 13:37

## 2021-01-01 RX ADMIN — Medication 150 MILLIGRAM(S): at 12:41

## 2021-01-01 RX ADMIN — FENTANYL CITRATE 50 MICROGRAM(S): 50 INJECTION INTRAVENOUS at 21:36

## 2021-01-01 RX ADMIN — FENTANYL CITRATE 50 MICROGRAM(S): 50 INJECTION INTRAVENOUS at 10:56

## 2021-01-01 RX ADMIN — PIPERACILLIN AND TAZOBACTAM 200 GRAM(S): 4; .5 INJECTION, POWDER, LYOPHILIZED, FOR SOLUTION INTRAVENOUS at 23:50

## 2021-01-01 RX ADMIN — Medication 40 MILLIGRAM(S): at 11:02

## 2021-01-01 RX ADMIN — CHLORHEXIDINE GLUCONATE 15 MILLILITER(S): 213 SOLUTION TOPICAL at 11:02

## 2021-01-01 RX ADMIN — PROPOFOL 5.08 MICROGRAM(S)/KG/MIN: 10 INJECTION, EMULSION INTRAVENOUS at 11:45

## 2021-01-01 RX ADMIN — MEROPENEM 100 MILLIGRAM(S): 1 INJECTION INTRAVENOUS at 22:40

## 2021-01-01 RX ADMIN — Medication 6 MILLIGRAM(S): at 12:45

## 2021-01-01 RX ADMIN — Medication 650 MILLIGRAM(S): at 15:18

## 2021-01-01 RX ADMIN — SODIUM ZIRCONIUM CYCLOSILICATE 10 GRAM(S): 10 POWDER, FOR SUSPENSION ORAL at 18:14

## 2021-01-01 RX ADMIN — Medication 20 MILLIEQUIVALENT(S): at 23:20

## 2021-01-01 RX ADMIN — Medication 3 MILLILITER(S): at 12:27

## 2021-01-01 RX ADMIN — INSULIN HUMAN 10 UNIT(S): 100 INJECTION, SOLUTION SUBCUTANEOUS at 11:39

## 2021-01-01 RX ADMIN — REMDESIVIR 200 MILLIGRAM(S): 5 INJECTION INTRAVENOUS at 18:59

## 2021-01-01 RX ADMIN — Medication 650 MILLIGRAM(S): at 23:55

## 2021-01-01 RX ADMIN — Medication 3 MILLILITER(S): at 05:35

## 2021-01-01 RX ADMIN — FENTANYL CITRATE 50 MICROGRAM(S): 50 INJECTION INTRAVENOUS at 20:14

## 2021-01-01 RX ADMIN — SODIUM CHLORIDE 150 MILLILITER(S): 9 INJECTION, SOLUTION INTRAVENOUS at 05:44

## 2021-01-01 RX ADMIN — Medication 6 MILLIGRAM(S): at 12:00

## 2021-01-01 RX ADMIN — LISINOPRIL 10 MILLIGRAM(S): 2.5 TABLET ORAL at 09:21

## 2021-01-01 RX ADMIN — FENTANYL CITRATE 4.23 MICROGRAM(S)/KG/HR: 50 INJECTION INTRAVENOUS at 12:28

## 2021-01-01 RX ADMIN — MEROPENEM 100 MILLIGRAM(S): 1 INJECTION INTRAVENOUS at 05:45

## 2021-01-01 RX ADMIN — PIPERACILLIN AND TAZOBACTAM 200 GRAM(S): 4; .5 INJECTION, POWDER, LYOPHILIZED, FOR SOLUTION INTRAVENOUS at 17:10

## 2021-01-01 RX ADMIN — PANTOPRAZOLE SODIUM 40 MILLIGRAM(S): 20 TABLET, DELAYED RELEASE ORAL at 22:01

## 2021-12-01 NOTE — H&P ADULT - NSHPLABSRESULTS_GEN_ALL_CORE
LABS:                         13.8   14.60 )-----------( 330      ( 01 Dec 2021 13:02 )             42.0         137  |  96  |  17  ----------------------------<  169<H>  4.0   |  18<L>  |  1.07    Ca    9.0      01 Dec 2021 13:02  Mg     1.7         TPro  7.4  /  Alb  3.2<L>  /  TBili  1.0  /  DBili  x   /  AST  89<H>  /  ALT  28  /  AlkPhos  157<H>      PT/INR - ( 01 Dec 2021 13:02 )   PT: 22.4 sec;   INR: 1.93          PTT - ( 01 Dec 2021 13:02 )  PTT:28.0 sec  Urinalysis Basic - ( 01 Dec 2021 13:41 )    Color: Yellow / Appearance: Clear / S.025 / pH: x  Gluc: x / Ketone: NEGATIVE  / Bili: Negative / Urobili: 1.0 E.U./dL   Blood: x / Protein: 100 mg/dL / Nitrite: NEGATIVE   Leuk Esterase: NEGATIVE / RBC: 5-10 /HPF / WBC < 5 /HPF   Sq Epi: x / Non Sq Epi: 0-5 /HPF / Bacteria: Many /HPF    CARDIAC MARKERS ( 01 Dec 2021 13:02 )  x     / 0.51 ng/mL / x     / x     / x        Serum Pro-Brain Natriuretic Peptide: 3709 pg/mL ( @ 13:02)    Lactate, Blood: 1.7 mmol/L ( @ 15:37)  Lactate, Blood: 10.4 mmol/L ( @ 13:04)    RADIOLOGY, EKG & ADDITIONAL TESTS:

## 2021-12-01 NOTE — ED PROVIDER NOTE - CLINICAL SUMMARY MEDICAL DECISION MAKING FREE TEXT BOX
86 y/o M BIBEMS for respiratory distress with hypoxia and agitation.   ED Course: Pt intubated on ED arrival and placed on ventilator. Sepsis workup initiated with base IV fluids. Pt given broad spectrum IV abx and Decadron. chest xray noted with b/l diffuse infiltrates, ET tube pulled back after xray. EKG noted sinus tachycardia and nonspecific ST/T changes. Labs noted; Pt with elevated WBC 14 with shift and lactate of 10.4 . Pt is positive for Covid-19 in ED, also noted trop 0.51. Ct head and, chest, abdomen and pelvis obtained; will be followed by MICU team. MICU consulted in ED to see Pt, Pt to be admitted to MICU. 86 y/o M BIBEMS for respiratory distress with hypoxia and agitation.   ED Course: Pt intubated on ED arrival and placed on ventilator. Placed on Propofol and Fentanyl gtt. Sepsis workup initiated. Pt given broad spectrum IV abx and Decadron. chest xray noted with b/l diffuse infiltrates. ET tube pulled back after xray. EKG noted and with sinus tachycardia and nonspecific ST/T changes. Labs noted; Pt with elevated WBC 14 with shift and lactate of 10.4. Pt is positive for Covid-19 in ED, also noted trop 0.51. Ct head and, chest, abdomen and pelvis obtained and noted. Pt with hypotension in ED post intubation started on Levophed gtt. Propofol dcd and pt started on versed. MICU consulted and in ED to see Pt.  Pt admitted to MICU.

## 2021-12-01 NOTE — ED PROVIDER NOTE - OBJECTIVE STATEMENT
History obtained from Pt's assistant and EMS as Pt is confused.   86 y/o M, on "blood thinners", recent diagnosis of Covid-19 2 days ago, with unknown medical history. Per assistant who is at bedside, she was called by the building's super and Pt's wife as they were unable to get in touch with Pt. She arrived this morning at his apartment to find him naked on the floor with a broken vase next to him and overturned furniture. Pt was agitated and confused. EMS was called and brought Pt to the ED. Assistant thinks Pt is on a blood thinner but does not know which kind. She is unsure of his medical history but reports that he tested positive for Covid-19 2 days ago. She think he may be vaccinated for Covid-19 but is unsure. Pt's wife is staying in [facility] and is on her way back to [facility]. More history can be obtained from wife. Wife is aware of situation. In ED, Pt is agitated, pulling on his arms, and fighting EMS. He is extremely confused and is not oriented to person; cannot provide any history. Per EMS, Pt was very agitated and O2 sat was in 70's upon their arrival and he was hypertensive to 170's/80's. History obtained from Pt's assistant and EMS as Pt is in respiratory distress and is agitated.   86 y/o M, on "blood thinners", recent diagnosis of Covid-19, 2 days ago, with unknown medical history p/w respiratory distress and agitation. Per assistant who is at bedside states that she was called by the building's super and Pt's wife as they were unable to get in touch with Pt. She arrived this morning at his apartment to find him naked on the floor with a broken vase next to him and overturned furniture. Pt was agitated and confused. EMS was called and brought Pt to the ED. Assistant thinks Pt is on a blood thinner but does not know which kind. She is unsure of his medical history but reports that he tested positive for Covid-19 2 days ago. She think he may be vaccinated for Covid-19 but is unsure. Pt's wife is staying in [facility] and is on her way back to [facility]. Wife is aware of situation. In ED, Pt is agitated, pulling on his arms, and fighting EMS. He is extremely confused and is not oriented to person; cannot provide any history. Per EMS, Pt was very agitated and O2 sat was in 70's upon their arrival and he was hypertensive to 170's/80's.

## 2021-12-01 NOTE — H&P ADULT - ATTENDING COMMENTS
Acute hypoxemic respiratory failure 2/2 COVID 19 pneumonia in severe ARDS with shock. Patient vaccinated with Pfizer x 2. Prone ventilation; low tidal volumes. Broad spectrum abx. Pan culture. Will send for Omicron sequencing. Clarify need for blood thinners; will hold for now.

## 2021-12-01 NOTE — PATIENT PROFILE ADULT - MEDICATIONS/VISITS
Quality 226: Preventive Care And Screening: Tobacco Use: Screening And Cessation Intervention: Patient screened for tobacco and never smoked Quality 128: Preventive Care And Screening: Body Mass Index (Bmi) Screening And Follow-Up Plan: BMI is documented within normal parameters and no follow-up plan is required. Quality 431: Preventive Care And Screening: Unhealthy Alcohol Use - Screening: Patient screened for unhealthy alcohol use using a single question and scores less than 2 times per year Quality 110: Preventive Care And Screening: Influenza Immunization: Influenza immunization was not ordered or administered, reason not given Detail Level: Detailed Quality 130: Documentation Of Current Medications In The Medical Record: Current Medications Documented no

## 2021-12-01 NOTE — PATIENT PROFILE ADULT - FALL HARM RISK - HARM RISK INTERVENTIONS
Communicate Risk of Fall with Harm to all staff/Monitor for mental status changes/Reinforce activity limits and safety measures with patient and family/Review medications for side effects contributing to fall risk/Tailored Fall Risk Interventions/Visual Cue: Yellow wristband and red socks/Bed in lowest position, wheels locked, appropriate side rails in place/Call bell, personal items and telephone in reach/Instruct patient to call for assistance before getting out of bed or chair/Non-slip footwear when patient is out of bed/[place] to call system/Physically safe environment - no spills, clutter or unnecessary equipment/Purposeful Proactive Rounding/Room/bathroom lighting operational, light cord in reach/Unable to comprehend Communicate Risk of Fall with Harm to all staff/Reinforce activity limits and safety measures with patient and family/Tailored Fall Risk Interventions/Visual Cue: Yellow wristband and red socks/Bed in lowest position, wheels locked, appropriate side rails in place/Call bell, personal items and telephone in reach/Instruct patient to call for assistance before getting out of bed or chair/Non-slip footwear when patient is out of bed/[place] to call system/Physically safe environment - no spills, clutter or unnecessary equipment/Purposeful Proactive Rounding/Room/bathroom lighting operational, light cord in reach/Unable to comprehend

## 2021-12-01 NOTE — H&P ADULT - NSHPPHYSICALEXAM_GEN_ALL_CORE
------------------INCOMPLETE-----------------------  VITAL SIGNS:  T(C): 34.5 (12-01-21 @ 15:00), Max: 35.2 (12-01-21 @ 12:33)  T(F): 94.1 (12-01-21 @ 15:00), Max: 95.3 (12-01-21 @ 12:33)  HR: 65 (12-01-21 @ 16:32) (63 - 140)  BP: 118/71 (12-01-21 @ 16:32) (68/39 - 180/84)  BP(mean): 69 (12-01-21 @ 16:01) (69 - 69)  RR: 26 (12-01-21 @ 16:32) (18 - 26)  SpO2: 96% (12-01-21 @ 16:32) (83% - 96%)  Wt(kg): --    PHYSICAL EXAM:    Constitutional: WDWN resting comfortably in bed; NAD  HEENT: NC/AT, PERRL, EOMI, anicteric sclera, no nasal discharge; uvula midline, no oropharyngeal erythema or exudates, MMM; no thyromegaly or anterior/posterior or submental RIA; neck supple  Respiratory: CTA B/L; no W/R/R, no retractions  Cardiac: +S1/S2; RRR; no M/R/G appreciated  Gastrointestinal: abdomen soft, NT/ND, +BS, no rebound tenderness or guarding  Back: no CVAT B/L  Extremities: legs WWP, no clubbing or cyanosis; no peripheral edema  Vascular: 2+ distal pedal pulses B/L  Dermatologic: skin warm, dry and intact; no rashes, wounds, or scars  Neurologic: AAOx3; CNII-XII grossly intact; strength 5/5 and sensation intact in all 4 extremities; no focal deficits  Psychiatric: affect and characteristics of appearance, verbalizations, behaviors are appropriate

## 2021-12-01 NOTE — H&P ADULT - HISTORY OF PRESENT ILLNESS
88 y/o male with unknown pmh but known to be on "blood thinners", recent diagnosis of Covid-19 2 days ago, who presented after being found altered, naked, and confused in his apartment, admitted for AHRF with sats in the 70s, patient was hypotensive, placed on Levo and intubated and transferred to MICU. Per assistant who is at bedside, she was called by the building's super and Pt's wife as they were unable to get in touch with Pt. She arrived this morning at his apartment to find him naked on the floor with a broken vase next to him and overturned furniture. Pt was agitated and confused. EMS was called and brought Pt to the ED. Assistant thinks Pt is on a blood thinner but does not know which kind. According to outpatient pharmacy patient takes xarelto 20, allopurinol 300mg, statin 10 mg, synthroid 50 mcg, lisinopril 10mg. She is unsure of his medical history but reports that he tested positive for Covid-19 2 days ago. She think he may be vaccinated for Covid-19 but is unsure. In ED, Pt is agitated, pulling on his arms, and fighting EMS. He is extremely confused and is not oriented to person; cannot provide any history, O2 sat was in 70's upon their arrival and he was hypertensive to 170's/80's. ICU was consulted and patient was intubated and transferred to MICU for further monitoring.           - Abx  - remdesivir   - RVP  - Decadron   - DVT ppx  - Levophed  - Bcx. urine, sputum cx  - Prone  86 y/o male with unknown pmh but known to be on "blood thinners", recent diagnosis of Covid-19 2 days ago, who presented after being found altered, naked, and confused in his apartment, admitted for AHRF with sats in the 70s, patient was hypotensive, placed on Levo and intubated and transferred to MICU. Per assistant who is at bedside, she was called by the building's super and Pt's wife as they were unable to get in touch with Pt. She arrived this morning at his apartment to find him naked on the floor with a broken vase next to him and overturned furniture. Pt was agitated and confused. EMS was called and brought Pt to the ED. Assistant thinks Pt is on a blood thinner but does not know which kind. According to outpatient pharmacy patient takes xarelto 20, allopurinol 300mg, statin 10 mg, synthroid 50 mcg, lisinopril 10mg. She is unsure of his medical history but reports that he tested positive for Covid-19 2 days ago. She think he may be vaccinated for Covid-19 but is unsure. In ED, Pt is agitated, pulling on his arms, and fighting EMS. He is extremely confused and is not oriented to person; cannot provide any history, O2 sat was in 70's upon their arrival and he was hypertensive to 170's/80's. ICU was consulted and patient was intubated and transferred to MICU for further monitoring.     ED Vitals: 95.3, initialled SBPs in 180s--> 60s-80s, HR 140s--> 60s, 26 RR and 83% on nonrebreather  ED Labs: WBC 14.6, Hgb 13, Na and K wnl, Bicarb 18, AG 23, lactate 10.4--->1.7, Cr 1.1, mild transaminitis, 0.5 Troponin T, AB.31/39/113/20. CTH neg, CT chest with b/l pulmonary consolidations, CT A/P neg, CT PE neg   ED Interventions: Initially given 10 mg IV versed. Patient intubated and sedated, started on levophed and antibiotics, A line and central line placed and MICU consulted         - Abx  - remdesivir   - RVP  - Decadron   - DVT ppx  - Levophed  - Bcx. urine, sputum cx  - Prone  88 y/o male with unknown pmh but known to be on "blood thinners", recent diagnosis of Covid-19 2 days ago, who presented after being found altered, naked, and confused in his apartment, admitted for AHRF with sats in the 70s, patient was hypotensive, placed on Levo and intubated and transferred to MICU. Per assistant who is at bedside, she was called by the building's super and Pt's wife as they were unable to get in touch with Pt. She arrived this morning at his apartment to find him naked on the floor with a broken vase next to him and overturned furniture. Pt was agitated and confused. EMS was called and brought Pt to the ED. Assistant thinks Pt is on a blood thinner but does not know which kind. According to outpatient pharmacy patient takes xarelto 20, allopurinol 300mg, statin 10 mg, synthroid 50 mcg, lisinopril 10mg. She is unsure of his medical history but reports that he tested positive for Covid-19 2 days ago. She think he may be vaccinated for Covid-19 but is unsure. In ED, Pt is agitated, pulling on his arms, and fighting EMS. He is extremely confused and is not oriented to person; cannot provide any history, O2 sat was in 70's upon their arrival and he was hypertensive to 170's/80's. ICU was consulted and patient was intubated and transferred to MICU for further monitoring.     ED Vitals: 95.3, initialled SBPs in 180s--> 60s-80s, HR 140s--> 60s, 26 RR and 83% on nonrebreather  ED Labs: WBC 14.6, Hgb 13, Na and K wnl, Bicarb 18, AG 23, lactate 10.4--->1.7, Cr 1.1, mild transaminitis, 0.5 Troponin T, AB.31/39/113/20. CTH neg, CT chest with b/l pulmonary consolidations, CT A/P neg, CT PE neg   ED Interventions: Initially given 10 mg IV versed. Patient intubated and sedated, started on levophed and antibiotics, A line and central line placed and MICU consulted

## 2021-12-01 NOTE — ED ADULT NURSE NOTE - NSIMPLEMENTINTERV_GEN_ALL_ED
Implemented All Fall with Harm Risk Interventions:  [place] to call system. Call bell, personal items and telephone within reach. Instruct patient to call for assistance. Room bathroom lighting operational. Non-slip footwear when patient is off stretcher. Physically safe environment: no spills, clutter or unnecessary equipment. Stretcher in lowest position, wheels locked, appropriate side rails in place. Provide visual cue, wrist band, yellow gown, etc. Monitor gait and stability. Monitor for mental status changes and reorient to person, place, and time. Review medications for side effects contributing to fall risk. Reinforce activity limits and safety measures with patient and family. Provide visual clues: red socks.

## 2021-12-01 NOTE — H&P ADULT - ASSESSMENT
86 y/o male with unknown pmh but known to be on "blood thinners" and with a recent diagnosis of Covid-19 2 days ago who is presenting with acute altered mental status change and HD instability, found to have b/l pulmonary consolidations on CT scan, now intubated for respiratory distress, started on levophed and antibiotics    NEURO      CARDS      PULM    GI      RENAL      HEME    F: None   E: Replete as necessary K>4 Mg>2  N: DASH diet   DVT Prophylaxis: Lovenox 40mg daily   GI prophylaxis: None   CODE STATUS: FULL  88 y/o male with unknown pmh but known to be on "blood thinners" and with a recent diagnosis of Covid-19 2 days ago who is presenting with acute altered mental status change and HD instability, found to have b/l pulmonary consolidations on CT scan, now intubated for respiratory distress, started on levophed and antibiotics    NEURO  Patient AOx0 in the ED, aggitated, acute mental status change per coworkers  Patient initially hypertensive and tachycardic but later became profoundly hypotensive   Presumed to be due to sepsis in the setting of covid pneumonia and possible super-imposed bacterial infection  - Patient now intubated and sedated and on pressors     CARDS  #Shock 2/2 sepsis   Meeting 4/4 SIRS criteria in the ED  Hypotensive with MAPS in 50s  - Levo and vasopressin with MAP goals 60-65, wean as tolerated   - Holding home lisinopril     #Patient on blood thinners  Patient reportedly taking blood thinners, per out patient pharmacy on xarelto  - Will hold until we gain further collateral and in case he may need procedure     #HLD  - holding home statin    PULM  #AHRF in the setting of COVID PNA  CT scan with b/l consolidations  Hypoxic into the 70s, altered, meeting 4/4 sirs criteria  Intubated   Patient tested + 2 days ago and apparently is vaccinated   - RVP  - Prone  - Remdesivir  - Decadron   - CW Vanco/zosyn    GI  # Mild transaminitis   In the setting of sepsis  - ctm     RENAL  Renal function stable, Cr 1.1     HEME  No active issues    Endo  Takes home synthroid  - c/w this med     F: None   E: Replete as necessary K>4 Mg>2  N: DASH diet   DVT Prophylaxis: Lovenox 40mg daily   GI prophylaxis: None   CODE STATUS: FULL  88 y/o male with unknown pmh but known to be on "blood thinners" and with a recent diagnosis of Covid-19 2 days ago who is presenting with acute altered mental status change and HD instability, found to have b/l pulmonary consolidations on CT scan, now intubated for respiratory distress, started on levophed and antibiotics    NEURO  Patient AOx0 in the ED, aggitated, acute mental status change per coworkers  Patient initially hypertensive and tachycardic but later became profoundly hypotensive   Presumed to be due to sepsis in the setting of covid pneumonia and possible super-imposed bacterial infection  - Patient now intubated and sedated and on pressors     CARDS  #Shock 2/2 sepsis   Meeting 4/4 SIRS criteria in the ED  Hypotensive with MAPS in 50s  - Levo and vasopressin with MAP goals 60-65, wean as tolerated   - Holding home lisinopril     #Patient on blood thinners  Patient reportedly taking blood thinners, per out patient pharmacy on xarelto  - Will hold until we gain further collateral and in case he may need procedure     #HLD  - holding home statin    PULM  #AHRF in the setting of COVID PNA  CT scan with b/l consolidations  Hypoxic into the 70s, altered, meeting 4/4 sirs criteria  Intubated   Patient tested + 2 days ago and apparently is vaccinated   - RVP  - Prone  - Remdesivir  - Decadron   - CW Vanco/zosyn  - Toculizumab     GI  # Mild transaminitis   In the setting of sepsis  - ctm     RENAL  Renal function stable, Cr 1.1     HEME  No active issues    Endo  Takes home synthroid  - c/w this med     F: None   E: Replete as necessary K>4 Mg>2  N: NPO  DVT Prophylaxis: Lovenox 40mg daily   GI prophylaxis: None   CODE STATUS: FULL

## 2021-12-01 NOTE — ED ADULT NURSE REASSESSMENT NOTE - NS ED NURSE REASSESS COMMENT FT1
Patient intubated by Dr. Meehan. RT, RNx 2, ED Tech and pharmacist at bedside. ET size 7.5, 25 at lip line. Patient tolerated procedure.

## 2021-12-01 NOTE — ED ADULT NURSE REASSESSMENT NOTE - NS ED NURSE REASSESS COMMENT FT1
2 RN & respiratory therapist accompanied patient to CT scan.  CT done, repeat rectal temp 94.1 chetna hugger in place, handoff given to SAVANNAH Copeland RN, ICU team at the bedside.

## 2021-12-01 NOTE — ED ADULT TRIAGE NOTE - CHIEF COMPLAINT QUOTE
pt BIBA EMS for AMS, pt was found naked and confused on the floor by an employee today, last known normal yesterday evening, per employee pt vaccinated for covid but told her on Monday that he tested positive, FS en route 145, pt awake but combative and non verbal, placed in resus, Dr. Meehan at bedside pt BIBA EMS for AMS, pt was found naked and confused on the floor by an employee today, last known normal yesterday evening, per employee pt vaccinated for covid but told her on Monday that he tested positive, FS en route 145, pt awake but combative and non verbal, placed in resus, Dr. Meehan at bedside, employee info: Dai Moyer, [phone]

## 2021-12-01 NOTE — CONSULT NOTE ADULT - SUBJECTIVE AND OBJECTIVE BOX
Patient is a 87y old  Male who presents with a chief complaint of AMS    Consult reason: Hypoxic respiratory failure  ED vitals:   T 95.3    RR 26  /82  SpO2 83% on NRB  Labs: WBC 14.6, INR 1.93, Lactate 10.4, bicarb 18, AG 23, BUN 17, Cr 1.07, AST 89, trop 0.51, ABG   CXR: bilateral consolidations/opacities consistent with COVID  CT C/A/P w/ IV contrast: Large bilateral pulmonary groundglass and solid alveolar opacities throughout both lungs worst in the lower lobes. Patent bronchi. Small bilateral pleural effusions.   CT Head/c-spine: No acute hemorrhage or fracture. Advanced multilevel disc degeneration. Greatest spinal stenosis at C5-6.   EKG: Sinus Tachycardia  Interventions: S/p intubation, fentanyl gtt, propofol gtt, levophed gtt, vasopressin gtt, Vancomycin, Zosyn, dexamethasone 6mg IV, 2L NS  Consults: ICU    HPI:  87M w/ unknown PMHx, BIBEMS after being found naked and confused at home. Of note, patient tested positive for COVID on , although was vaccinated Pfizer x2 in May 2021, unknown if patient received booster dose.  History gathered from patient's , who reports that patient is on a blood-thinner but not sure the reason. Patient last known well yesterday. EMS noted patient to be awake but non-verbal and combative in transit.     ROS: Unable to obtain due to sedation.     PAST MEDICAL & SURGICAL HISTORY:  Unknown    Home Medications:  ALLOPURINOL  300 MG TABS:  (01 Dec 2021 15:42)  ATORVASTATIN CALCIUM  10 MG TABS:  (01 Dec 2021 15:42)  LEVOTHYROXINE SODIUM  50 MCG TABS:  (01 Dec 2021 15:42)  LISINOPRIL  10 MG TABS:  (01 Dec 2021 15:42)  XARELTO  20 MG TABS:  (01 Dec 2021 15:42)    MEDICATIONS  (STANDING):  chlorhexidine 4% Liquid 1 Application(s) Topical <User Schedule>  enoxaparin Injectable 40 milliGRAM(s) SubCutaneous at bedtime  fentaNYL   Infusion. 0.5 MICROgram(s)/kG/Hr (4.23 mL/Hr) IV Continuous <Continuous>  norepinephrine Infusion 0.05 MICROgram(s)/kG/Min (7.93 mL/Hr) IV Continuous <Continuous>  propofol Infusion 10 MICROgram(s)/kG/Min (5.08 mL/Hr) IV Continuous <Continuous>  remdesivir  IVPB   IV Intermittent   remdesivir  IVPB 200 milliGRAM(s) IV Intermittent every 24 hours  vasopressin Infusion 0.04 Unit(s)/Min (2.4 mL/Hr) IV Continuous <Continuous>    MEDICATIONS  (PRN):      Allergies    Allergy Status Unknown    Intolerances        SOCIAL HX:       FAMILY HISTORY:  FAMILY HISTORY:  :      PHYSICAL EXAM:    ICU Vital Signs Last 24 Hrs  T(C): 34.5 (01 Dec 2021 15:00), Max: 35.2 (01 Dec 2021 12:33)  T(F): 94.1 (01 Dec 2021 15:00), Max: 95.3 (01 Dec 2021 12:33)  HR: 67 (01 Dec 2021 16:01) (65 - 140)  BP: 90/56 (01 Dec 2021 16:01) (68/39 - 180/84)  BP(mean): 69 (01 Dec 2021 16:) (69 - 69)  ABP: --  ABP(mean): --  RR: 26 (01 Dec 2021 16:01) (18 - 26)  SpO2: 96% (01 Dec 2021 16:01) (83% - 96%)      General: NAD; +intubated and sedated  HEENT:  NC/AT, EOMI, sclera anicteric, PERRL       Lymphatic system: No LN  Lungs: Decreased breath sounds bilaterally  Cardiovascular: +Tachycardic, regular rhythm, nl s1, s2, no m/r/g appreciated  Gastrointestinal: abdomen soft, NTND, bowel sounds present  Musculoskeletal: No clubbing.  Skin: Warm, dry, intact. No rashes noted. +Skin pale  Neurological: +Sedated        LABS:                          13.8   14.60 )-----------( 330      ( 01 Dec 2021 13:02 )             42.0                                               12-    137  |  96  |  17  ----------------------------<  169<H>  4.0   |  18<L>  |  1.07    Ca    9.0      01 Dec 2021 13:02  Mg     1.7     12    TPro  7.4  /  Alb  3.2<L>  /  TBili  1.0  /  DBili  x   /  AST  89<H>  /  ALT  28  /  AlkPhos  157<H>  12-      PT/INR - ( 01 Dec 2021 13:02 )   PT: 22.4 sec;   INR: 1.93          PTT - ( 01 Dec 2021 13:02 )  PTT:28.0 sec                                       Urinalysis Basic - ( 01 Dec 2021 13:41 )    Color: Yellow / Appearance: Clear / S.025 / pH: x  Gluc: x / Ketone: NEGATIVE  / Bili: Negative / Urobili: 1.0 E.U./dL   Blood: x / Protein: 100 mg/dL / Nitrite: NEGATIVE   Leuk Esterase: NEGATIVE / RBC: 5-10 /HPF / WBC < 5 /HPF   Sq Epi: x / Non Sq Epi: 0-5 /HPF / Bacteria: Many /HPF        CARDIAC MARKERS ( 01 Dec 2021 13:02 )  x     / 0.51 ng/mL / x     / x     / x                                                LIVER FUNCTIONS - ( 01 Dec 2021 13:02 )  Alb: 3.2 g/dL / Pro: 7.4 g/dL / ALK PHOS: 157 U/L / ALT: 28 U/L / AST: 89 U/L / GGT: x                                                                                               Mode: AC/ CMV (Assist Control/ Continuous Mandatory Ventilation)  RR (machine): 16  TV (machine): 450  FiO2: 100  PEEP: 5  ITime: 1  MAP: 9  PIP: 20                                      ABG - ( 01 Dec 2021 15:36 )  pH, Arterial: 7.28  pH, Blood: x     /  pCO2: 46    /  pO2: 70    / HCO3: 22    / Base Excess: -5.2  /  SaO2: 93.8

## 2021-12-01 NOTE — ED ADULT NURSE REASSESSMENT NOTE - NS ED NURSE REASSESS COMMENT FT1
received pt from RN Binu @4438. BP 90/52, HR 67bpm, 96% on vent. zosyn and fluids running. pt intubated and sedated. propofol currently NOT running. levo running @ .15mcg/kg/min. fentanyl running @ 3.5mcg/kg/hr. 3 PIVs in place and flushed without difficulty. MICU team at bedside performing arterial stick. awaiting vanco from pharmacy and MICI bed placement. karimi in place. received pt from RN Binu @7526. BP 90/52, HR 67bpm, 96% on vent. zosyn and fluids running. pt intubated and sedated. propofol currently NOT running. levo running @ .15mcg/kg/min. fentanyl running @ 3.5mcg/kg/hr. 3 PIVs in place and flushed without difficulty. MICU team at bedside performing arterial stick. awaiting vanco from pharmacy and MICI bed placement. pt on chetna hugger because of reported low rectal temp. karimi in place.

## 2021-12-01 NOTE — ED PROVIDER NOTE - PHYSICAL EXAMINATION
CONSTITUTIONAL: Arrived naked, agitated, confused, unable to provide history. Extremities are cold to touch.   ENMT: Airway patent.  EYES: Clear bilaterally.  CARDIAC: Tachycardic regular rhythm.  Heart sounds S1, S2.   RESPIRATORY: Mildly tachypneic, b/l rales and rhonchi.  GASTROINTESTINAL: Soft, obese abdomen.  MUSCULOSKELETAL: Spine appears normal, range of motion is not limited, no muscle or joint tenderness  NEUROLOGICAL: Unable to obtain as Pt is agitated.  SKIN: Extremities cold to touch. Skin normal color for race, dry and intact. No evidence of rash.  PSYCHIATRIC: Unable to obtain as Pt is agitated.

## 2021-12-01 NOTE — CONSULT NOTE ADULT - ASSESSMENT
87M w/ positive COVID test on 11/29, presented after being found naked and confused at home, found to have acute hypoxic respiratory failure likely 2/2 COVID PNA, now s/p intubation.    Neuro:  #Toxic-Metabolic encephalopathy  - 2/2 COVID encephalopathy vs. hypoxia  - Found confused and non-verbal at home  - Intubated and sedated on propofol   - RASS goal -2    CVS:  #Hypotension  - Likely 2/2 sedation vs. septic shock  - C/w levophed and vasopressin  - S/p 2L NS in ED  - Hold off on further fluids    #HLD  - Hold home atorvastatin 10    #HTN  - Hold home lisinopril 10    Pulm:  #Acute hypoxic respiratory failure   - Likely 2/2 COVID  - CT chest with ground glass and solid alveolar opacities  - ABG in ED 7.31/39/113/20  - S/p Intubation on ventilator in the ED  - Trial of extubation in the AM  - C/w Vanc and Zosyn  - Prone patient in the ICU  - C/w dexamethasone 6mg x10 days  - Remdesivir x5 days  - F/u RVP  - F/u procal    GI:  - Pantoprazole 40mg GI ppx while on steroids  - NG tube in place    /Renal:  - Mitchell in place  - Strict Is&Os    Heme:  - Hole home Xarelto 20  - Lovenox DVT ppx    Endocrine:  #Hypothyroidism  - C/w Home synthroid 50 daily    ID:  #Severe sepsis  - Known COVID positive 2 days prior to admission  - F/u repeat COVID swab  - F/u BCx  - C/w Vanc/Zosyn  - Obtain Sputum culture  - F/u RVP  - F/u procal    Rheum:  #Gout  - Hold home allopurinol 300    Prophylaxis:  F: None   E: Replete for K<4, Mag<2  N: NPO  AC: Lovenox  GI ppx: Protonix  Code status: Full  Dispo: MICU

## 2021-12-01 NOTE — ED ADULT NURSE REASSESSMENT NOTE - NS ED NURSE REASSESS COMMENT FT1
50fentanyl push given @1430. MICU NP and PA at bedside placing a line and central line. VS asper flow sheet.

## 2021-12-01 NOTE — ED ADULT NURSE NOTE - CHIEF COMPLAINT QUOTE
pt BIBA EMS for AMS, pt was found naked and confused on the floor by an employee today, last known normal yesterday evening, per employee pt vaccinated for covid but told her on Monday that he tested positive, FS en route 145, pt awake but combative and non verbal, placed in resus, Dr. Meehan at bedside, employee info: Dai Moyer, [phone]

## 2021-12-01 NOTE — PATIENT PROFILE ADULT - FUNCTIONAL ASSESSMENT - BASIC MOBILITY 2.
Short term disability forms have the incorrect RTW date of 5/16/19   Pt and Dr. Jackson discussed her RTW date of 6/18/19  Pt will have her insurance company refax her forms for correction.   Sierra Flores RN on 4/25/2019 at 10:52 AM      
4 = No assist / stand by assistance

## 2021-12-02 NOTE — DIETITIAN INITIAL EVALUATION ADULT. - ENTERAL
At current propofol rate, recommend starting Nepro @ 44ml/hr x 24hrs plus 1 Liquid Protein Supplement (100kcal, 15g pro) via NG. Provides: 1056ml free H2O, 2001kcal/2466kcal w/propofol, 101g pro, 768ml free H2O, 1.2g/kg IBW pro. Recommend starting at 10mL/hr and advancing by [id number] to goal as tolerated. Additional free H2O per team. Monitor for s/s intolerance; maintain aspiration precautions at all times.

## 2021-12-02 NOTE — PROGRESS NOTE ADULT - ASSESSMENT
86 y/o male with unknown pmh but known to be on "blood thinners" and with a recent diagnosis of Covid-19 2 days ago who is presenting with acute altered mental status change and HD instability, found to have b/l pulmonary consolidations on CT scan, now intubated for respiratory distress, started on levophed and antibiotics    NEURO  Patient AOx0 in the ED, aggitated, acute mental status change per coworkers  Patient initially hypertensive and tachycardic but later became profoundly hypotensive   Presumed to be due to sepsis in the setting of covid pneumonia and possible super-imposed bacterial infection  - Patient now intubated and sedated and on pressors     CARDS  #Shock 2/2 sepsis   Meeting 4/4 SIRS criteria in the ED  Hypotensive with MAPS in 50s  - Levo and vasopressin with MAP goals 60-65, wean as tolerated  - Increased pressor requirements this afternoon   - Holding home lisinopril     #Patient on blood thinners  Patient reportedly taking blood thinners, per out patient pharmacy on xarelto  - Will hold until we gain further collateral and in case he may need procedure     #HLD  - holding home statin    PULM  #AHRF in the setting of COVID PNA  CT scan with b/l consolidations  Hypoxic into the 70s, altered, meeting 4/4 sirs criteria  Intubated and sedated on propofol and fentanyl  Patient tested + for covid on Monday and apparently is vaccinated   LFTs elevated today   Good UOP, roughly net even ~50-60cc/hr overnight  AB.27/40/167/18 w/ vent settings ACVC/480/RR18/MmO071%/PEEP12  - Patients FiO2 down to 60%  - patient was turned today no longer prone  - Remdesivir held today in setting of transaminitis   - CW Decadron   - CW Vanco/zosyn  - FU vanc level at 3  - Will repeat ABG on new vent settings if clinically worsens   - Toculizumab x1 on     GI  # transaminitis   In the setting of sepsis  Worse today, initiation of remdesivir may be contributing  - hold remdesivir   - ctm     RENAL  Renal function stable, Cr 1.1     HEME  No active issues    Endo  Takes home synthroid  - c/w this med     F: None   E: Replete as necessary K>4 Mg>2  N: NPO, will consider initiating nutrition   DVT Prophylaxis: Lovenox 40mg daily   GI prophylaxis: IV PPI  CODE STATUS: FULL  86 y/o male with HTN, gout, hypothyroid, and previous TIA on xarelto, and with a recent diagnosis of Covid-19 2 days ago who is presenting with acute altered mental status change and HD instability, found to have b/l pulmonary consolidations on CT scan, now intubated for respiratory distress, started on levophed and antibiotics    NEURO  Patient AOx0 in the ED, aggitated, acute mental status change per coworkers  Patient initially hypertensive and tachycardic but later became profoundly hypotensive   Presumed to be due to sepsis in the setting of covid pneumonia and possible super-imposed bacterial infection  - Patient now intubated and sedated and on pressors     #Prior TIA  Patient reportedly taking  xarelto for this, discussed with his wife  - Will hold in case he may need procedure  - Also should consider substituting for ASA later      CARDS  #Shock 2/2 sepsis   Meeting 4/4 SIRS criteria in the ED  Hypotensive with MAPS in 50s  - Levo and vasopressin with MAP goals 60-65, wean as tolerated  - Increased pressor requirements this afternoon   - Holding home lisinopril     #HLD  - holding home statin    PULM  #AHRF in the setting of COVID PNA  CT scan with b/l consolidations  Hypoxic into the 70s, altered, meeting 4/4 sirs criteria  Intubated and sedated on propofol and fentanyl  Patient tested + for covid on Monday and apparently is vaccinated   LFTs elevated today   Good UOP, roughly net even ~50-60cc/hr overnight  AB.27/40/167/18 w/ vent settings ACVC/480/RR18/HeN876%/PEEP12  - Patients FiO2 down to 60%  - patient was turned today no longer prone  - Remdesivir held today in setting of transaminitis   - CW Decadron   - CW Vanco/zosyn  - FU vanc level at 3  - Will repeat ABG on new vent settings if clinically worsens   - Toculizumab x1 on     GI  # transaminitis   In the setting of sepsis  Worse today, initiation of remdesivir may be contributing  - hold remdesivir   - ctm     RENAL  # MARISABEL   Baseline of 1.1 and came up to 1.6 today  Patient did have decent urine output overnight  Mildly positive, but given MARISABEL  - hold further diuresis at this time     HEME  No active issues    Endo  #Hypothyroid  Takes home synthroid  - c/w this med     #Gout  - Hold allopurinol     F: None   E: Replete as necessary K>4 Mg>2  N: NPO, will consider initiating nutrition   DVT Prophylaxis: Lovenox 40mg daily   GI prophylaxis: IV PPI  CODE STATUS: FULL

## 2021-12-02 NOTE — DIETITIAN INITIAL EVALUATION ADULT. - OTHER INFO
86 y/o male with unknown pmhx but known to be on "blood thinners" and with a recent diagnosis of Covid-19 2 days ago who is presenting with altered mental status and HD instability, found to have b/l pulmonary consolidations on CT scan, now intubated for respiratory distress. Pt discussed during MICU rounds. Pt remains intubated on VC/AC mode, sedated on propofol @ 17.6ml/hr (465kcal/day from lipids), and fentanyl. MAP 63- on levophed for BP support. Currently NPO, no dobhoff placed for EN at this time. Pt was proned at time of assessment, with plan to deprone after rounds. No BM recorded overnight. Skin noted with B/L arms trace edema, intact pressure-wise. Temp 99.9F this AM. Will continue to follow per RD protocol.

## 2021-12-02 NOTE — CHART NOTE - NSCHARTNOTEFT_GEN_A_CORE
Patient proned at 1710 with Dr. Bhakta, [facility] and ICU team. RN x 2 at bedside.  All bony prominences protected with Allevyn. ECG leads placed posteriorly.  Patient offloaded as per protocol.  Mild periorbital edema noted before proning.  Airway and IV lines patent.

## 2021-12-02 NOTE — DIETITIAN INITIAL EVALUATION ADULT. - NAME AND PHONE
Samantha Gitlin, RD, CDN, [facility], [phone] or available on [employer]

## 2021-12-02 NOTE — DIETITIAN INITIAL EVALUATION ADULT. - OTHER CALCULATIONS
ActualBW used for calculations as pt weighs <100% of IBW, per critical care nutrition guidelines. Needs estimated for age and adjusted for vent demands, COVID infection. Fluids per team 2/2 compromised respiratory status

## 2021-12-02 NOTE — PROGRESS NOTE ADULT - SUBJECTIVE AND OBJECTIVE BOX
OVERNIGHT EVENTS:    SUBJECTIVE:     VITAL SIGNS:  T(F): 99.7 (21 @ 06:51)  HR: 52 (21 @ 07:00)  BP: 109/52 (21 @ 19:00)  RR: 18 (21 @ 07:00)  SpO2: 100% (21 @ 07:00)  Wt(kg): --      21 @ 07:01  -  21 @ 07:00  --------------------------------------------------------  IN: 1865.8 mL / OUT: 420 mL / NET: 1445.8 mL        Drips:    Lines/tubes:      PHYSICAL EXAM:    Constitutional: Laying in bed, no acute distress, appears stated age, well nurished   Neurological: AAOx3, answers all questions appropriately, CN Grossly intact, no FNDs, can move all 4 extremities  HEENT: PERRL, EOMI, sclera non-icteric, neck supple, no masses, no JVD, MMM, good dentition  Respiratory: No labored breathing or respiratory distress, CTA b/l, good air entry b/l, no wheezing,  no crackles/rales, without accessory muscle use and no intercostal retractions  Cardiovascular: RRR, normal S1S2, no murmurs or rubs   Gastrointestinal: soft, non tender, non distended, neg hepatojugular reflex, no masses or overt organomegaly palpable, BS normal  Extremities: Warm, well perfused, pulses equal bilateral upper and lower extremities, no edema, no clubbing, no evidence of cyanosis   Skin: Normal temperature, warm, dry. No rashes or lesions     MEDICATIONS  (STANDING):  chlorhexidine 0.12% Liquid 15 milliLiter(s) Oral Mucosa every 12 hours  dexAMETHasone  Injectable 6 milliGRAM(s) IV Push every 24 hours  dextrose 40% Gel 15 Gram(s) Oral once  dextrose 5%. 1000 milliLiter(s) (50 mL/Hr) IV Continuous <Continuous>  dextrose 5%. 1000 milliLiter(s) (100 mL/Hr) IV Continuous <Continuous>  dextrose 50% Injectable 25 Gram(s) IV Push once  dextrose 50% Injectable 12.5 Gram(s) IV Push once  dextrose 50% Injectable 25 Gram(s) IV Push once  enoxaparin Injectable 40 milliGRAM(s) SubCutaneous every 24 hours  fentaNYL   Infusion. 0.5 MICROgram(s)/kG/Hr (4.23 mL/Hr) IV Continuous <Continuous>  glucagon  Injectable 1 milliGRAM(s) IntraMuscular once  insulin lispro (ADMELOG) corrective regimen sliding scale   SubCutaneous three times a day before meals  insulin lispro (ADMELOG) corrective regimen sliding scale   SubCutaneous at bedtime  levothyroxine Injectable 37.5 MICROGram(s) IV Push at bedtime  norepinephrine Infusion 0.05 MICROgram(s)/kG/Min (3.97 mL/Hr) IV Continuous <Continuous>  pantoprazole  Injectable 40 milliGRAM(s) IV Push every 24 hours  piperacillin/tazobactam IVPB.. 3.375 Gram(s) IV Intermittent every 6 hours  propofol Infusion 10 MICROgram(s)/kG/Min (5.08 mL/Hr) IV Continuous <Continuous>  remdesivir  IVPB   IV Intermittent   remdesivir  IVPB 100 milliGRAM(s) IV Intermittent every 24 hours  vancomycin  IVPB 1500 milliGRAM(s) IV Intermittent every 12 hours  vasopressin Infusion 0.04 Unit(s)/Min (2.4 mL/Hr) IV Continuous <Continuous>    MEDICATIONS  (PRN):      Allergies    Allergy Status Unknown    Intolerances        LABS:                        13.8   14.60 )-----------( 330      ( 01 Dec 2021 13:02 )             42.0     12    137  |  104  |  x   ----------------------------<  166<H>  x    |  18<L>  |  1.57<H>    Ca    7.6<L>      02 Dec 2021 06:43  Mg     1.8         TPro  7.4  /  Alb  3.2<L>  /  TBili  1.0  /  DBili  x   /  AST  89<H>  /  ALT  28  /  AlkPhos  157<H>  12    PT/INR - ( 01 Dec 2021 13:02 )   PT: 22.4 sec;   INR: 1.93          PTT - ( 01 Dec 2021 13:02 )  PTT:28.0 sec  Urinalysis Basic - ( 01 Dec 2021 13:41 )    Color: Yellow / Appearance: Clear / S.025 / pH: x  Gluc: x / Ketone: NEGATIVE  / Bili: Negative / Urobili: 1.0 E.U./dL   Blood: x / Protein: 100 mg/dL / Nitrite: NEGATIVE   Leuk Esterase: NEGATIVE / RBC: 5-10 /HPF / WBC < 5 /HPF   Sq Epi: x / Non Sq Epi: 0-5 /HPF / Bacteria: Many /HPF          RADIOLOGY & ADDITIONAL TESTS:  Reviewed    MICRO:     Culture - Blood (collected 21 @ 14:05)  Source: .Blood Blood-Peripheral  Preliminary Report (21 @ 03:00):    No growth at 12 hours    Culture - Blood (collected 21 @ 14:05)  Source: .Blood Blood-Peripheral  Preliminary Report (21 @ 03:00):    No growth at 12 hours     OVERNIGHT EVENTS:  - ABG with improvment  - Patient in sync with vent   -     SUBJECTIVE:     VITAL SIGNS:  T(F): 99.7 (21 @ 06:51)  HR: 52 (21 @ 07:00)  BP: 109/52 (21 @ 19:00)  RR: 18 (21 @ 07:00)  SpO2: 100% (21 @ 07:00)  Wt(kg): --      21 @ 07:01  -  21 @ 07:00  --------------------------------------------------------  IN: 1865.8 mL / OUT: 420 mL / NET: 1445.8 mL        Drips:    Lines/tubes:      PHYSICAL EXAM:    Constitutional: Laying in bed, no acute distress, appears stated age, well nurished   Neurological: AAOx3, answers all questions appropriately, CN Grossly intact, no FNDs, can move all 4 extremities  HEENT: PERRL, EOMI, sclera non-icteric, neck supple, no masses, no JVD, MMM, good dentition  Respiratory: No labored breathing or respiratory distress, CTA b/l, good air entry b/l, no wheezing,  no crackles/rales, without accessory muscle use and no intercostal retractions  Cardiovascular: RRR, normal S1S2, no murmurs or rubs   Gastrointestinal: soft, non tender, non distended, neg hepatojugular reflex, no masses or overt organomegaly palpable, BS normal  Extremities: Warm, well perfused, pulses equal bilateral upper and lower extremities, no edema, no clubbing, no evidence of cyanosis   Skin: Normal temperature, warm, dry. No rashes or lesions     MEDICATIONS  (STANDING):  chlorhexidine 0.12% Liquid 15 milliLiter(s) Oral Mucosa every 12 hours  dexAMETHasone  Injectable 6 milliGRAM(s) IV Push every 24 hours  dextrose 40% Gel 15 Gram(s) Oral once  dextrose 5%. 1000 milliLiter(s) (50 mL/Hr) IV Continuous <Continuous>  dextrose 5%. 1000 milliLiter(s) (100 mL/Hr) IV Continuous <Continuous>  dextrose 50% Injectable 25 Gram(s) IV Push once  dextrose 50% Injectable 12.5 Gram(s) IV Push once  dextrose 50% Injectable 25 Gram(s) IV Push once  enoxaparin Injectable 40 milliGRAM(s) SubCutaneous every 24 hours  fentaNYL   Infusion. 0.5 MICROgram(s)/kG/Hr (4.23 mL/Hr) IV Continuous <Continuous>  glucagon  Injectable 1 milliGRAM(s) IntraMuscular once  insulin lispro (ADMELOG) corrective regimen sliding scale   SubCutaneous three times a day before meals  insulin lispro (ADMELOG) corrective regimen sliding scale   SubCutaneous at bedtime  levothyroxine Injectable 37.5 MICROGram(s) IV Push at bedtime  norepinephrine Infusion 0.05 MICROgram(s)/kG/Min (3.97 mL/Hr) IV Continuous <Continuous>  pantoprazole  Injectable 40 milliGRAM(s) IV Push every 24 hours  piperacillin/tazobactam IVPB.. 3.375 Gram(s) IV Intermittent every 6 hours  propofol Infusion 10 MICROgram(s)/kG/Min (5.08 mL/Hr) IV Continuous <Continuous>  remdesivir  IVPB   IV Intermittent   remdesivir  IVPB 100 milliGRAM(s) IV Intermittent every 24 hours  vancomycin  IVPB 1500 milliGRAM(s) IV Intermittent every 12 hours  vasopressin Infusion 0.04 Unit(s)/Min (2.4 mL/Hr) IV Continuous <Continuous>    MEDICATIONS  (PRN):      Allergies    Allergy Status Unknown    Intolerances        LABS:                        13.8   14.60 )-----------( 330      ( 01 Dec 2021 13:02 )             42.0     12    137  |  104  |  x   ----------------------------<  166<H>  x    |  18<L>  |  1.57<H>    Ca    7.6<L>      02 Dec 2021 06:43  Mg     1.8         TPro  7.4  /  Alb  3.2<L>  /  TBili  1.0  /  DBili  x   /  AST  89<H>  /  ALT  28  /  AlkPhos  157<H>  12    PT/INR - ( 01 Dec 2021 13:02 )   PT: 22.4 sec;   INR: 1.93          PTT - ( 01 Dec 2021 13:02 )  PTT:28.0 sec  Urinalysis Basic - ( 01 Dec 2021 13:41 )    Color: Yellow / Appearance: Clear / S.025 / pH: x  Gluc: x / Ketone: NEGATIVE  / Bili: Negative / Urobili: 1.0 E.U./dL   Blood: x / Protein: 100 mg/dL / Nitrite: NEGATIVE   Leuk Esterase: NEGATIVE / RBC: 5-10 /HPF / WBC < 5 /HPF   Sq Epi: x / Non Sq Epi: 0-5 /HPF / Bacteria: Many /HPF          RADIOLOGY & ADDITIONAL TESTS:  Reviewed    MICRO:     Culture - Blood (collected 21 @ 14:05)  Source: .Blood Blood-Peripheral  Preliminary Report (21 @ 03:00):    No growth at 12 hours    Culture - Blood (collected 21 @ 14:05)  Source: .Blood Blood-Peripheral  Preliminary Report (21 @ 03:00):    No growth at 12 hours     OVERNIGHT EVENTS:  - ABG with improvment  - Patient in sync with vent so weaned down on propofol  - kae overnight    SUBJECTIVE:   Intubated and sedated    VITAL SIGNS:  T(F): 99.7 (21 @ 06:51)  HR: 52 (21 @ 07:00)  BP: 109/52 (21 @ 19:00)  RR: 18 (21 @ 07:00)  SpO2: 100% (21 @ 07:00)    21 @ 07:01  -  21 @ 07:00  --------------------------------------------------------  IN: 1865.8 mL / OUT: 420 mL / NET: 1445.8 mL    Drips:  Fentanyl  Propofol  Vasopressin  Levo    Lines/tubes:  Right radial A line  Left IJ TLC  PIV x2  Mitchell    PHYSICAL EXAM:    Constitutional: Laying in bed, proned, intubated and sedated   Neurological: Unable to assess   HEENT:  neck supple, no masses, no JVD  Respiratory: In sync w/ vent, minor crackles bl, otherwise clear   Cardiovascular: RRR, normal S1S2, no murmurs or rubs   Gastrointestinal: patient laying on stomach, could not assess   Extremities: Extremities are cold distally in all 4 extremities, weak distal pulses  Skin: No lesions noted     MEDICATIONS  (STANDING):  chlorhexidine 0.12% Liquid 15 milliLiter(s) Oral Mucosa every 12 hours  dexAMETHasone  Injectable 6 milliGRAM(s) IV Push every 24 hours  dextrose 40% Gel 15 Gram(s) Oral once  dextrose 5%. 1000 milliLiter(s) (50 mL/Hr) IV Continuous <Continuous>  dextrose 5%. 1000 milliLiter(s) (100 mL/Hr) IV Continuous <Continuous>  dextrose 50% Injectable 25 Gram(s) IV Push once  dextrose 50% Injectable 12.5 Gram(s) IV Push once  dextrose 50% Injectable 25 Gram(s) IV Push once  enoxaparin Injectable 40 milliGRAM(s) SubCutaneous every 24 hours  fentaNYL   Infusion. 0.5 MICROgram(s)/kG/Hr (4.23 mL/Hr) IV Continuous <Continuous>  glucagon  Injectable 1 milliGRAM(s) IntraMuscular once  insulin lispro (ADMELOG) corrective regimen sliding scale   SubCutaneous three times a day before meals  insulin lispro (ADMELOG) corrective regimen sliding scale   SubCutaneous at bedtime  levothyroxine Injectable 37.5 MICROGram(s) IV Push at bedtime  norepinephrine Infusion 0.05 MICROgram(s)/kG/Min (3.97 mL/Hr) IV Continuous <Continuous>  pantoprazole  Injectable 40 milliGRAM(s) IV Push every 24 hours  piperacillin/tazobactam IVPB.. 3.375 Gram(s) IV Intermittent every 6 hours  propofol Infusion 10 MICROgram(s)/kG/Min (5.08 mL/Hr) IV Continuous <Continuous>  remdesivir  IVPB   IV Intermittent   remdesivir  IVPB 100 milliGRAM(s) IV Intermittent every 24 hours  vancomycin  IVPB 1500 milliGRAM(s) IV Intermittent every 12 hours  vasopressin Infusion 0.04 Unit(s)/Min (2.4 mL/Hr) IV Continuous <Continuous>    Allergies    Allergy Status Unknown    Intolerances        LABS:                        13.8   14.60 )-----------( 330      ( 01 Dec 2021 13:02 )             42.0     12    137  |  104  |  x   ----------------------------<  166<H>  x    |  18<L>  |  1.57<H>    Ca    7.6<L>      02 Dec 2021 06:43  Mg     1.8         TPro  7.4  /  Alb  3.2<L>  /  TBili  1.0  /  DBili  x   /  AST  89<H>  /  ALT  28  /  AlkPhos  157<H>  12    PT/INR - ( 01 Dec 2021 13:02 )   PT: 22.4 sec;   INR: 1.93          PTT - ( 01 Dec 2021 13:02 )  PTT:28.0 sec  Urinalysis Basic - ( 01 Dec 2021 13:41 )    Color: Yellow / Appearance: Clear / S.025 / pH: x  Gluc: x / Ketone: NEGATIVE  / Bili: Negative / Urobili: 1.0 E.U./dL   Blood: x / Protein: 100 mg/dL / Nitrite: NEGATIVE   Leuk Esterase: NEGATIVE / RBC: 5-10 /HPF / WBC < 5 /HPF   Sq Epi: x / Non Sq Epi: 0-5 /HPF / Bacteria: Many /HPF    RADIOLOGY & ADDITIONAL TESTS:  Reviewed     MICRO:     Culture - Blood (collected 21 @ 14:05)  Source: .Blood Blood-Peripheral  Preliminary Report (21 @ 03:00):    No growth at 12 hours    Culture - Blood (collected 21 @ 14:05)  Source: .Blood Blood-Peripheral  Preliminary Report (21 @ 03:00):    No growth at 12 hours

## 2021-12-03 NOTE — CONSULT NOTE ADULT - SUBJECTIVE AND OBJECTIVE BOX
[facility] Geriatrics and Palliative Care  Chirag Finley, Palliative Care Attending  Contact Info: Call [phone] (HEAL Line) or message on Microsoft Teams (Chirag Finley)    HPI:  86 y/o male with unknown pmh but known to be on "blood thinners", recent diagnosis of Covid-19 2 days ago, who presented after being found altered, naked, and confused in his apartment, admitted for AHRF with sats in the 70s, patient was hypotensive, placed on Levo and intubated and transferred to MICU. Per assistant who is at bedside, she was called by the building's super and Pt's wife as they were unable to get in touch with Pt. She arrived this morning at his apartment to find him naked on the floor with a broken vase next to him and overturned furniture. Pt was agitated and confused. EMS was called and brought Pt to the ED. Assistant thinks Pt is on a blood thinner but does not know which kind. According to outpatient pharmacy patient takes xarelto 20, allopurinol 300mg, statin 10 mg, synthroid 50 mcg, lisinopril 10mg. She is unsure of his medical history but reports that he tested positive for Covid-19 2 days ago. She think he may be vaccinated for Covid-19 but is unsure. In ED, Pt is agitated, pulling on his arms, and fighting EMS. He is extremely confused and is not oriented to person; cannot provide any history, O2 sat was in 70's upon their arrival and he was hypertensive to 170's/80's. ICU was consulted and patient was intubated and transferred to MICU for further monitoring.     ED Vitals: 95.3, initialled SBPs in 180s--> 60s-80s, HR 140s--> 60s, 26 RR and 83% on nonrebreather  ED Labs: WBC 14.6, Hgb 13, Na and K wnl, Bicarb 18, AG 23, lactate 10.4--->1.7, Cr 1.1, mild transaminitis, 0.5 Troponin T, AB.31/39/113/20. CTH neg, CT chest with b/l pulmonary consolidations, CT A/P neg, CT PE neg   ED Interventions: Initially given 10 mg IV versed. Patient intubated and sedated, started on levophed and antibiotics, A line and central line placed and MICU consulted (01 Dec 2021 15:49)    PERTINENT PM/SXH:       FAMILY HISTORY:    ITEMS NOT CHECKED ARE NOT PRESENT    SOCIAL HISTORY:   Significant other/partner:  []  Children:  []   Substance hx:  []   Tobacco hx:  []   Alcohol hx: []   Home Opioid hx:  [] I-Stop Reference No:  - no active Rx's / see chart note  Living Situation: []Home  []Long term care  []Rehab []Other  Cheondoism/Spiritual practice: ; Role of organized Methodist [ ] important [ ] some [ ] unable to assess  Coping: [ ] well [ ] with difficulty [ ] poor coping [ ] unable to assess  Support system: [ ] strong [ ] adequate [ ] inadequate    ADVANCE DIRECTIVES:    []MOLST  []Living Will  DECISION MAKER(s):  [] Health Care Proxy(s)  [] Surrogate(s)  [] Guardian           Name(s)/Phone Number(s):     BASELINE (I)ADLs (prior to admission):  [place]: []Total  [] Moderate []Dependent    ALLERGIES:  Allergy Status Unknown    MEDICATIONS  (STANDING):  chlorhexidine 0.12% Liquid 15 milliLiter(s) Oral Mucosa every 12 hours  chlorhexidine 2% Cloths 1 Application(s) Topical <User Schedule>  dexAMETHasone  Injectable 6 milliGRAM(s) IV Push every 24 hours  dextrose 40% Gel 15 Gram(s) Oral once  dextrose 5%. 1000 milliLiter(s) (50 mL/Hr) IV Continuous <Continuous>  dextrose 5%. 1000 milliLiter(s) (100 mL/Hr) IV Continuous <Continuous>  dextrose 50% Injectable 25 Gram(s) IV Push once  dextrose 50% Injectable 12.5 Gram(s) IV Push once  dextrose 50% Injectable 25 Gram(s) IV Push once  enoxaparin Injectable 40 milliGRAM(s) SubCutaneous every 24 hours  fentaNYL   Infusion. 0.5 MICROgram(s)/kG/Hr (4.23 mL/Hr) IV Continuous <Continuous>  glucagon  Injectable 1 milliGRAM(s) IntraMuscular once  insulin lispro (ADMELOG) corrective regimen sliding scale   SubCutaneous every 6 hours  levothyroxine Injectable 37.5 MICROGram(s) IV Push at bedtime  norepinephrine Infusion 0.05 MICROgram(s)/kG/Min (3.97 mL/Hr) IV Continuous <Continuous>  pantoprazole  Injectable 40 milliGRAM(s) IV Push every 24 hours  piperacillin/tazobactam IVPB.. 3.375 Gram(s) IV Intermittent every 6 hours  propofol Infusion 10 MICROgram(s)/kG/Min (5.08 mL/Hr) IV Continuous <Continuous>  sodium zirconium cyclosilicate 10 Gram(s) Oral every 8 hours  sodium zirconium cyclosilicate 10 Gram(s) Oral once    MEDICATIONS  (PRN):    PRESENT SYMPTOMS: []Unable to obtain due to poor mentation/encephalopathy  Source if other than patient:  []Family   []Team     Pain: [ ] yes [ ] no  QOL Impact -   Location -                    Aggravating Factors -  Quality -  Radiation -  Timing -  Severity (0-10 scale) -   Minimal Acceptable Level (0-10 scale) -    PAIN AD Score:  [email or web address] (press ctrl +  left click to view)    Dyspnea:                           []Mild  []Moderate []Severe  Anxiety:                             []Mild []Moderate []Severe  Fatigue:                             []Mild []Moderate []Severe  Nausea:                             []Mild []Moderate []Severe  Loss of Appetite:              []Mild []Moderate []Severe  Constipation:                    []Mild []Moderate []Severe    Other Symptoms:  [x]All Other Review Of Systems Negative     Palliative Performance Status Version 2:  %    http://npcrc.org/files/news/palliative_performance_scale_ppsv2.pdf    PHYSICAL EXAM:  GENERAL:  []Alert  []Oriented x   []Lethargic  []Cachexia  []Unarousable  []Verbal  []Non-Verbal  Behavioral:   []Anxiety  []Delirium []Agitation []Cooperative  HEENT:  []Normal   []Dry mouth   []ET Tube/Trach  []Oral lesions  PULMONARY:   []Clear []Tachypnea  []Audible excessive secretions   []Rhonchi        []Right []Left []Bilateral  []Crackles        []Right []Left []Bilateral  []Wheezing     []Right []Left []Bilateral  CARDIOVASCULAR:    []Regular []Irregular []Tachy  []Jony []Murmur []Other  GASTROINTESTINAL:  []Soft  []Distended   []+BS  []Non tender []Tender  []PEG []OGT/ NGT  Last BM:     GENITOURINARY:  []Normal [] Incontinent   []Oliguria/Anuria   []Mitchell  MUSCULOSKELETAL:   []Normal   []Weakness  []Bed/Wheelchair bound []Edema  NEUROLOGIC:   []No focal deficits  []Cognitive impairment  []Dysphagia []Dysarthria []Paresis []Encephalopathic   SKIN:   []Normal   []Pressure ulcer(s)  []Rash    CRITICAL CARE:  [ ]Shock Present  [ ]Septic [ ]Cardiogenic [ ]Neurologic [ ]Hypovolemic  [ ]Vasopressors [ ]Inotropes   [ ]Respiratory failure present [ ]Mechanical Ventilation [ ]Non-invasive ventilatory support [ ]High-Flow  [ ]Acute  [ ]Chronic [ ]Hypoxic  [ ]Hypercarbic  [ ]Other organ failure    Vital Signs Last 24 Hrs  T(C): 36.4 (03 Dec 2021 14:00), Max: 37.7 (02 Dec 2021 21:50)  T(F): 97.5 (03 Dec 2021 14:00), Max: 99.8 (02 Dec 2021 21:50)  HR: 57 (03 Dec 2021 16:00) (50 - 68)  BP: 124/57 (03 Dec 2021 16:00) (111/53 - 124/57)  BP(mean): 82 (03 Dec 2021 16:00) (77 - 84)  RR: 18 (03 Dec 2021 13:00) (18 - 18)  SpO2: 85% (03 Dec 2021 16:00) (85% - 100%) I&O's Summary    02 Dec 2021 07:01  -  03 Dec 2021 07:00  --------------------------------------------------------  IN: 1442 mL / OUT: 910 mL / NET: 532 mL    03 Dec 2021 07:01  -  03 Dec 2021 16:31  --------------------------------------------------------  IN: 343.4 mL / OUT: 535 mL / NET: -191.6 mL        LABS:                        11.1   11.65 )-----------( 301      ( 03 Dec 2021 07:00 )             34.0       138  |  105  |  53<H>  ----------------------------<  139<H>  5.5<H>   |  18<L>  |  2.25<H>    Ca    7.7<L>      03 Dec 2021 15:35  Phos  6.9       Mg     2.1         TPro  5.6<L>  /  Alb  2.5<L>  /  TBili  0.8  /  DBili  x   /  AST  553<H>  /  ALT  489<H>  /  AlkPhos  111      RADIOLOGY & ADDITIONAL STUDIES:      PROTEIN CALORIE MALNUTRITION PRESENT: [ ]mild [ ]moderate [ ]severe [ ]underweight [ ]morbid obesity  []PPSV2 < or = to 30% []significant weight loss  []poor nutritional intake []catabolic state []anasarca     Artificial Nutrition []     REFERRALS:  [x]Social Work  []Case management []PT/OT []Chaplaincy  []Hospice  []Patient/Family Support    DISCUSSION OF CASE: Family - to obtain additional history and to provide emotional support; ( ) -     Care Coordination/Goals of Care Document:        PALLIATIVE MEDICINE COORDINATION OF CARE DOCUMENTATION: [x] Inpatient Consult  Non-Face-to-Face prolonged service provided that relates to (face-to-face) care that has or will occur and ongoing patient management, including one or more of the following: - Reviewed documentation from other physicians and other health care professional services - Reviewed medical records and diagnostic / radiology study results - Coordination with patient's support system  ************************************************************************  MEDICATION REVIEW:  - See Medication List Above    ISTOP REFERENCE:   - no active Rx's / see ISTOP Chart Note  - PRN usage: NO PRN'S  ------------------------------------------------------------------------  COORDINATION OF CARE:  - Palliative Care consulted for: GOC / Symptom Management  - Patient (to be) assessed:  - Patient previously seen by Palliative Care service: NO    ADVANCE CARE PLANNING  - Code status:  - MOLST reviewed in chart: NONE; None found on Alpha  - HCP/Surrogate:  - GOC documents: NONE found on [place]  - HCP/Living will/Other Advanced Directives in Alpha: NONE found on Alpha  ------------------------------------------------------------------------  CARE PROVIDER DOCUMENTATION:  - SW/CM notes: Remains medically active  -   -   -     PLAN OF CARE  - Known Admissions in Past Year:  - Current Admit Date:  - LOS:  - LACE score:   - Current Dispo Plan: TO BE DETERMINED    21 (2d)  ------------------------------------------------------------------------  - Time Spent/Chart reviewed: 31 Minutes [including time used to gather, review and transfer data]  - Start:  - End:    Prolonged services rendered, as part of this patient's care provided by Palliative Medicine, include: i. chart review for provider and ancillary service documentation, ii. pertinent diagnostics including laboratory and imaging studies, iii. medication review including PRN use, iv. admission history including previous palliative care encounters and GOC notes, v. advance care planning documents including HCP and MOLST forms in Alpha. Part of Palliative Medicine extended evaluation and management also involves coordination of care with our IDT, the primary and consulting teams, and unit CM/SW and Hospice if eligible. Recommendations based on the information gathered and discussed are outlined in the A/P of Palliative notes. [facility] Geriatrics and Palliative Care  Chirag Finley, Palliative Care Attending  Contact Info: Call [phone] (HEAL Line) or message on [employer] Teams (Chirag Finley)    HPI:  88 y/o male with unknown pmh but known to be on "blood thinners", recent diagnosis of Covid-19 2 days ago, who presented after being found altered, naked, and confused in his apartment, admitted for AHRF with sats in the 70s, patient was hypotensive, placed on Levo and intubated and transferred to MICU. Per assistant who is at bedside, she was called by the building's super and Pt's wife as they were unable to get in touch with Pt. She arrived this morning at his apartment to find him naked on the floor with a broken vase next to him and overturned furniture. Pt was agitated and confused. EMS was called and brought Pt to the ED. Assistant thinks Pt is on a blood thinner but does not know which kind. According to outpatient pharmacy patient takes xarelto 20, allopurinol 300mg, statin 10 mg, synthroid 50 mcg, lisinopril 10mg. She is unsure of his medical history but reports that he tested positive for Covid-19 2 days ago. She think he may be vaccinated for Covid-19 but is unsure. In ED, Pt is agitated, pulling on his arms, and fighting EMS. He is extremely confused and is not oriented to person; cannot provide any history, O2 sat was in 70's upon their arrival and he was hypertensive to 170's/80's. ICU was consulted and patient was intubated and transferred to MICU for further monitoring.  (01 Dec 2021 15:49)    Patient sedated/intubated in ICU. Unable to participate in interview. Collateral obtained from patient's wife.    PERTINENT PM/SXH:   HTN  HLD  Hypothyroidism    FAMILY HISTORY:  Non-contributory in first degree relatives    ITEMS NOT CHECKED ARE NOT PRESENT    SOCIAL HISTORY:   Significant other/partner:  [x]  Children:  []   Substance hx:  []   Tobacco hx:  []   Alcohol hx: []   Home Opioid hx:  [] I-Stop Reference No:  - no active Rx's / see chart note  Living Situation: [x]Home  []Long term care  []Rehab []Other  Coping: [ ] well [ ] with difficulty [ ] poor coping [x] unable to assess  Support system: [ ] strong [x] adequate [ ] inadequate    ADVANCE DIRECTIVES:    []MOLST  []Living Will  DECISION MAKER(s):  [] Health Care Proxy(s)  [x] Surrogate(s)  [] Guardian           Name(s)/Phone Number(s):     BASELINE (I)ADLs (prior to admission):  [place]: []Total  [x] Moderate []Dependent    ALLERGIES:  Allergy Status Unknown    MEDICATIONS  (STANDING):  chlorhexidine 0.12% Liquid 15 milliLiter(s) Oral Mucosa every 12 hours  chlorhexidine 2% Cloths 1 Application(s) Topical <User Schedule>  dexAMETHasone  Injectable 6 milliGRAM(s) IV Push every 24 hours  dextrose 40% Gel 15 Gram(s) Oral once  dextrose 5%. 1000 milliLiter(s) (50 mL/Hr) IV Continuous <Continuous>  dextrose 5%. 1000 milliLiter(s) (100 mL/Hr) IV Continuous <Continuous>  dextrose 50% Injectable 25 Gram(s) IV Push once  dextrose 50% Injectable 12.5 Gram(s) IV Push once  dextrose 50% Injectable 25 Gram(s) IV Push once  enoxaparin Injectable 40 milliGRAM(s) SubCutaneous every 24 hours  fentaNYL   Infusion. 0.5 MICROgram(s)/kG/Hr (4.23 mL/Hr) IV Continuous <Continuous>  glucagon  Injectable 1 milliGRAM(s) IntraMuscular once  insulin lispro (ADMELOG) corrective regimen sliding scale   SubCutaneous every 6 hours  levothyroxine Injectable 37.5 MICROGram(s) IV Push at bedtime  norepinephrine Infusion 0.05 MICROgram(s)/kG/Min (3.97 mL/Hr) IV Continuous <Continuous>  pantoprazole  Injectable 40 milliGRAM(s) IV Push every 24 hours  piperacillin/tazobactam IVPB.. 3.375 Gram(s) IV Intermittent every 6 hours  propofol Infusion 10 MICROgram(s)/kG/Min (5.08 mL/Hr) IV Continuous <Continuous>  sodium zirconium cyclosilicate 10 Gram(s) Oral every 8 hours  sodium zirconium cyclosilicate 10 Gram(s) Oral once    PRESENT SYMPTOMS: [x]Unable to obtain due to poor mentation/encephalopathy  Source if other than patient:  []Family   []Team     PAIN AD Score: 0  [email or web address] (press ctrl +  left click to view)    Dyspnea:                           []Mild  []Moderate []Severe  Anxiety:                             []Mild []Moderate []Severe  Fatigue:                             []Mild []Moderate []Severe  Nausea:                             []Mild []Moderate []Severe  Loss of Appetite:              []Mild []Moderate []Severe  Constipation:                    []Mild []Moderate []Severe    Other Symptoms:  [x]All Other Review Of Systems Negative     Palliative Performance Status Version 2:  10%    http://[facility].org/files/news/palliative_performance_scale_ppsv2.pdf    PHYSICAL EXAM:  GENERAL:  []Alert  []Oriented x   []Lethargic  []Cachexia  [x]Unarousable  []Verbal  [x]Non-Verbal  Behavioral:   []Anxiety  [x]Delirium []Agitation []Cooperative  HEENT:  []Normal   []Dry mouth   [x]ET Tube/Trach  []Oral lesions  PULMONARY:   []Clear [x]Tachypnea  []Audible excessive secretions   [x]Rhonchi        []Right []Left [x]Bilateral  []Crackles        []Right []Left []Bilateral  []Wheezing     []Right []Left []Bilateral  CARDIOVASCULAR:    []Regular []Irregular [x]Tachy  []Jony []Murmur []Other  GASTROINTESTINAL:  [x]Soft  [x]Distended   [x]+BS  []Non tender []Tender  []PEG [x]OGT/ NGT  Last BM:   GENITOURINARY:  []Normal [] Incontinent   []Oliguria/Anuria   [x]Mitchell  MUSCULOSKELETAL:   []Normal   []Weakness  [x]Bed/Wheelchair bound []Edema  NEUROLOGIC:   []No focal deficits  []Cognitive impairment  []Dysphagia []Dysarthria []Paresis [x]Encephalopathic   SKIN:   [x]Normal   []Pressure ulcer(s)  []Rash    CRITICAL CARE:  [x]Shock Present  [x]Septic [ ]Cardiogenic [ ]Neurologic [ ]Hypovolemic  [x]Vasopressors [ ]Inotropes   [x]Respiratory failure present [x]Mechanical Ventilation [ ]Non-invasive ventilatory support [ ]High-Flow  [x]Acute  [ ]Chronic [x]Hypoxic  [ ]Hypercarbic  [ ]Other organ failure    Vital Signs Last 24 Hrs  T(C): 36.4 (03 Dec 2021 14:00), Max: 37.7 (02 Dec 2021 21:50)  T(F): 97.5 (03 Dec 2021 14:00), Max: 99.8 (02 Dec 2021 21:50)  HR: 57 (03 Dec 2021 16:00) (50 - 68)  BP: 124/57 (03 Dec 2021 16:00) (111/53 - 124/57)  BP(mean): 82 (03 Dec 2021 16:00) (77 - 84)  RR: 18 (03 Dec 2021 13:00) (18 - 18)  SpO2: 85% (03 Dec 2021 16:00) (85% - 100%) I&O's Summary    02 Dec 2021 07:01  -  03 Dec 2021 07:00  --------------------------------------------------------  IN: 1442 mL / OUT: 910 mL / NET: 532 mL    03 Dec 2021 07:01  -  03 Dec 2021 16:31  --------------------------------------------------------  IN: 343.4 mL / OUT: 535 mL / NET: -191.6 mL    LABS:             11.1   11.65 )-----------( 301      ( 03 Dec 2021 07:00 )             34.0   12-03    138  |  105  |  53<H>  ----------------------------<  139<H>  5.5<H>   |  18<L>  |  2.25<H>    Ca    7.7<L>      03 Dec 2021 15:35  Phos  6.9     12-03  Mg     2.1     12-03    TPro  5.6<L>  /  Alb  2.5<L>  /  TBili  0.8  /  DBili  x   /  AST  553<H>  /  ALT  489<H>  /  AlkPhos  111  12-03    RADIOLOGY & ADDITIONAL STUDIES:  < from: Xray Chest 1 View- PORTABLE-Urgent (Xray Chest 1 View- PORTABLE-Urgent .) (12.03.21 @ 18:48) >  Dobbhoff feeding tube with tip overlying distal esophagus. Left frontal examination the chest demonstrates Dobbhoff feeding tube with tip overlying distal esophagus and should be advanced. Left internal jugular line noted tip superior vena cava. Endotracheal tube in satisfactory position Bilateral infiltrates. Right effusion  IMPRESSION: Dobbhoff feeding tube as described. Bilateral infiltrates. Right effusion    REFERRALS:  [x]Social Work  []Case management []PT/OT []Chaplaincy  []Hospice  []Patient/Family Support    DISCUSSION OF CASE: Mrs. Schreiber (wife) - to obtain additional history and to provide emotional support     Care Coordination/Goals of Care Document:   PALLIATIVE MEDICINE COORDINATION OF CARE DOCUMENTATION: [x] Inpatient Consult  Non-Face-to-Face prolonged service provided that relates to (face-to-face) care that has or will occur and ongoing patient management, including one or more of the following: - Reviewed documentation from other physicians and other health care professional services - Reviewed medical records and diagnostic / radiology study results - Coordination with patient's support system  ************************************************************************  MEDICATION REVIEW:  - See Medication List Above    ISTOP REFERENCE:   - no active Rx's / see ISTOP Chart Note  - PRN usage: NO PRN'S  ------------------------------------------------------------------------  COORDINATION OF CARE:  - Palliative Care consulted for: GOC / Symptom Management  - Patient (to be) assessed:  - Patient previously seen by Palliative Care service: NO    ADVANCE CARE PLANNING  - Code status:  - MOLST reviewed in chart: NONE; None found on Alpha  - HCP/Surrogate:  - GOC documents: NONE found on [place]  - HCP/Living will/Other Advanced Directives in Alpha: NONE found on Alpha  ------------------------------------------------------------------------  CARE PROVIDER DOCUMENTATION:  - SW/CM notes: Remains medically active  -   -   -     PLAN OF CARE  - Known Admissions in Past Year:  - Current Admit Date: 12/1/21  - LOS: 2  - LACE score:   - Current Dispo Plan: TO BE DETERMINED  ------------------------------------------------------------------------  - Time Spent/Chart reviewed: 31 Minutes [including time used to gather, review and transfer data]  - Start: 9:00AM  - End: 9:31AM    Prolonged services rendered, as part of this patient's care provided by Palliative Medicine, include: i. chart review for provider and ancillary service documentation, ii. pertinent diagnostics including laboratory and imaging studies, iii. medication review including PRN use, iv. admission history including previous palliative care encounters and GOC notes, v. advance care planning documents including HCP and MOLST forms in Alpha. Part of Palliative Medicine extended evaluation and management also involves coordination of care with our IDT, the primary and consulting teams, and unit CM/SW and Hospice if eligible. Recommendations based on the information gathered and discussed are outlined in the A/P of Palliative notes. [facility] Geriatrics and Palliative Care  Chirag Finley, Palliative Care Attending  Contact Info: Call [phone] (HEAL Line) or message on [employer] Teams (Chirag Finley)    HPI:  86 y/o male with unknown pmh but known to be on "blood thinners", recent diagnosis of Covid-19 2 days ago, who presented after being found altered, naked, and confused in his apartment, admitted for AHRF with sats in the 70s, patient was hypotensive, placed on Levo and intubated and transferred to MICU. Per assistant who is at bedside, she was called by the building's super and Pt's wife as they were unable to get in touch with Pt. She arrived this morning at his apartment to find him naked on the floor with a broken vase next to him and overturned furniture. Pt was agitated and confused. EMS was called and brought Pt to the ED. Assistant thinks Pt is on a blood thinner but does not know which kind. According to outpatient pharmacy patient takes xarelto 20, allopurinol 300mg, statin 10 mg, synthroid 50 mcg, lisinopril 10mg. She is unsure of his medical history but reports that he tested positive for Covid-19 2 days ago. She think he may be vaccinated for Covid-19 but is unsure. In ED, Pt is agitated, pulling on his arms, and fighting EMS. He is extremely confused and is not oriented to person; cannot provide any history, O2 sat was in 70's upon their arrival and he was hypertensive to 170's/80's. ICU was consulted and patient was intubated and transferred to MICU for further monitoring.  (01 Dec 2021 15:49)    Patient sedated/intubated in ICU. Unable to participate in interview. Collateral obtained from patient's wife. She states patient has been in good health but around Thanksgiving started to have cold/flu-like symptoms. She was quarantining in their vacation home while the patient stayed in [facility]. They have a  that assists with IADLs and she was scheduled to have a call with the patient but he did not answer which was unusual. THis prompted investigation and patient was found delirious and the apartment was in disarray. Ultimately the patient was intubated for respiratory failure.    PERTINENT PM/SXH:   HTN  HLD  Hypothyroidism    FAMILY HISTORY:  Non-contributory in first degree relatives    ITEMS NOT CHECKED ARE NOT PRESENT    SOCIAL HISTORY:   Significant other/partner:  [x]  Children:  []   Substance hx:  []   Tobacco hx:  []   Alcohol hx: []   Home Opioid hx:  [] I-Stop Reference No: [id number]  - no active Rx's  Living Situation: [x]Home  []Long term care  []Rehab []Other  Coping: [ ] well [ ] with difficulty [ ] poor coping [x] unable to assess  Support system: [ ] strong [x] adequate [ ] inadequate    ADVANCE DIRECTIVES:    []MOLST  []Living Will  DECISION MAKER(s):  [] Health Care Proxy(s)  [x] Surrogate(s)  [] Guardian           Name(s)/Phone Number(s): Fredi Schreiber, [phone]    BASELINE (I)ADLs (prior to admission):  [place]: []Total  [x] Moderate []Dependent    ALLERGIES:  Allergy Status Unknown    MEDICATIONS  (STANDING):  chlorhexidine 0.12% Liquid 15 milliLiter(s) Oral Mucosa every 12 hours  chlorhexidine 2% Cloths 1 Application(s) Topical <User Schedule>  dexAMETHasone  Injectable 6 milliGRAM(s) IV Push every 24 hours  dextrose 40% Gel 15 Gram(s) Oral once  dextrose 5%. 1000 milliLiter(s) (50 mL/Hr) IV Continuous <Continuous>  dextrose 5%. 1000 milliLiter(s) (100 mL/Hr) IV Continuous <Continuous>  dextrose 50% Injectable 25 Gram(s) IV Push once  dextrose 50% Injectable 12.5 Gram(s) IV Push once  dextrose 50% Injectable 25 Gram(s) IV Push once  enoxaparin Injectable 40 milliGRAM(s) SubCutaneous every 24 hours  fentaNYL   Infusion. 0.5 MICROgram(s)/kG/Hr (4.23 mL/Hr) IV Continuous <Continuous>  glucagon  Injectable 1 milliGRAM(s) IntraMuscular once  insulin lispro (ADMELOG) corrective regimen sliding scale   SubCutaneous every 6 hours  levothyroxine Injectable 37.5 MICROGram(s) IV Push at bedtime  norepinephrine Infusion 0.05 MICROgram(s)/kG/Min (3.97 mL/Hr) IV Continuous <Continuous>  pantoprazole  Injectable 40 milliGRAM(s) IV Push every 24 hours  piperacillin/tazobactam IVPB.. 3.375 Gram(s) IV Intermittent every 6 hours  propofol Infusion 10 MICROgram(s)/kG/Min (5.08 mL/Hr) IV Continuous <Continuous>  sodium zirconium cyclosilicate 10 Gram(s) Oral every 8 hours  sodium zirconium cyclosilicate 10 Gram(s) Oral once    PRESENT SYMPTOMS: [x]Unable to obtain due to poor mentation/encephalopathy  Source if other than patient:  []Family   []Team     PAIN AD Score: 0  [email or web address] (press ctrl +  left click to view)    Dyspnea:                           []Mild  []Moderate []Severe  Anxiety:                             []Mild []Moderate []Severe  Fatigue:                             []Mild []Moderate []Severe  Nausea:                             []Mild []Moderate []Severe  Loss of Appetite:              []Mild []Moderate []Severe  Constipation:                    []Mild []Moderate []Severe    Other Symptoms:  [x]All Other Review Of Systems Negative     Palliative Performance Status Version 2:  10%    [email or web address]    PHYSICAL EXAM:  GENERAL:  []Alert  []Oriented x   []Lethargic  []Cachexia  [x]Unarousable  []Verbal  [x]Non-Verbal  Behavioral:   []Anxiety  [x]Delirium []Agitation []Cooperative  HEENT:  []Normal   []Dry mouth   [x]ET Tube/Trach  []Oral lesions  PULMONARY:   []Clear [x]Tachypnea  []Audible excessive secretions   [x]Rhonchi        []Right []Left [x]Bilateral  []Crackles        []Right []Left []Bilateral  []Wheezing     []Right []Left []Bilateral  CARDIOVASCULAR:    []Regular []Irregular [x]Tachy  []Jony []Murmur []Other  GASTROINTESTINAL:  [x]Soft  [x]Distended   [x]+BS  []Non tender []Tender  []PEG [x]OGT/ NGT  Last BM:   GENITOURINARY:  []Normal [] Incontinent   []Oliguria/Anuria   [x]Mitchell  MUSCULOSKELETAL:   []Normal   []Weakness  [x]Bed/Wheelchair bound []Edema  NEUROLOGIC:   []No focal deficits  []Cognitive impairment  []Dysphagia []Dysarthria []Paresis [x]Encephalopathic   SKIN:   [x]Normal   []Pressure ulcer(s)  []Rash    CRITICAL CARE:  [x]Shock Present  [x]Septic [ ]Cardiogenic [ ]Neurologic [ ]Hypovolemic  [x]Vasopressors [ ]Inotropes   [x]Respiratory failure present [x]Mechanical Ventilation [ ]Non-invasive ventilatory support [ ]High-Flow  [x]Acute  [ ]Chronic [x]Hypoxic  [ ]Hypercarbic  [ ]Other organ failure    Vital Signs Last 24 Hrs  T(C): 36.4 (03 Dec 2021 14:00), Max: 37.7 (02 Dec 2021 21:50)  T(F): 97.5 (03 Dec 2021 14:00), Max: 99.8 (02 Dec 2021 21:50)  HR: 57 (03 Dec 2021 16:00) (50 - 68)  BP: 124/57 (03 Dec 2021 16:00) (111/53 - 124/57)  BP(mean): 82 (03 Dec 2021 16:00) (77 - 84)  RR: 18 (03 Dec 2021 13:00) (18 - 18)  SpO2: 85% (03 Dec 2021 16:00) (85% - 100%) I&O's Summary    02 Dec 2021 07:01  -  03 Dec 2021 07:00  --------------------------------------------------------  IN: 1442 mL / OUT: 910 mL / NET: 532 mL    03 Dec 2021 07:01  -  03 Dec 2021 16:31  --------------------------------------------------------  IN: 343.4 mL / OUT: 535 mL / NET: -191.6 mL    LABS:             11.1   11.65 )-----------( 301      ( 03 Dec 2021 07:00 )             34.0   12-03    138  |  105  |  53<H>  ----------------------------<  139<H>  5.5<H>   |  18<L>  |  2.25<H>    Ca    7.7<L>      03 Dec 2021 15:35  Phos  6.9     12-03  Mg     2.1     12-03    TPro  5.6<L>  /  Alb  2.5<L>  /  TBili  0.8  /  DBili  x   /  AST  553<H>  /  ALT  489<H>  /  AlkPhos  111  12-03    RADIOLOGY & ADDITIONAL STUDIES:  < from: Xray Chest 1 View- PORTABLE-Urgent (Xray Chest 1 View- PORTABLE-Urgent .) (12.03.21 @ 18:48) >  Dobbhoff feeding tube with tip overlying distal esophagus. Left frontal examination the chest demonstrates Dobbhoff feeding tube with tip overlying distal esophagus and should be advanced. Left internal jugular line noted tip superior vena cava. Endotracheal tube in satisfactory position Bilateral infiltrates. Right effusion  IMPRESSION: Dobbhoff feeding tube as described. Bilateral infiltrates. Right effusion    REFERRALS:  [x]Social Work  []Case management []PT/OT []Chaplaincy  []Hospice  []Patient/Family Support    DISCUSSION OF CASE: Mrs. Schreiber (wife) - to obtain additional history and to provide emotional support     Care Coordination/Goals of Care Document:   PALLIATIVE MEDICINE COORDINATION OF CARE DOCUMENTATION: [x] Inpatient Consult  Non-Face-to-Face prolonged service provided that relates to (face-to-face) care that has or will occur and ongoing patient management, including one or more of the following: - Reviewed documentation from other physicians and other health care professional services - Reviewed medical records and diagnostic / radiology study results - Coordination with patient's support system  ************************************************************************  MEDICATION REVIEW:  - See Medication List Above    ISTOP REFERENCE: [id number]  - no active Rx's  - PRN usage: NO PRN'S  ------------------------------------------------------------------------  COORDINATION OF CARE:  - Palliative Care consulted for: GO  - Patient (to be) assessed: 12/3/21  - Patient previously seen by Palliative Care service: NO    ADVANCE CARE PLANNING  - Code status: Full Code  - MOLST reviewed in chart: NONE; None found on Alpha  - HCP/Surrogate: fredi Schreiber, [phone]  - [facility] documents: NONE found on [facility]  - HCP/Living will/Other Advanced Directives in Alpha: NONE found on Alpha  ------------------------------------------------------------------------  CARE PROVIDER DOCUMENTATION:  - SW/CM notes: Remains medically active  - ICU notes: P/F ratio improving daily. Oliguric renal failure; monitoring for now. Requiring proning.    PLAN OF CARE  - Known Admissions in Past Year: 0  - Current Admit Date: 12/1/21  - LOS: 2  - LACE score: 7  - Current Dispo Plan: TO BE DETERMINED  ------------------------------------------------------------------------  - Time Spent/Chart reviewed: 31 Minutes [including time used to gather, review and transfer data]  - Start: 9:00AM  - End: 9:31AM    Prolonged services rendered, as part of this patient's care provided by Palliative Medicine, include: i. chart review for provider and ancillary service documentation, ii. pertinent diagnostics including laboratory and imaging studies, iii. medication review including PRN use, iv. admission history including previous palliative care encounters and [facility] notes, v. advance care planning documents including HCP and MOLST forms in Alpha. Part of Palliative Medicine extended evaluation and management also involves coordination of care with our IDT, the primary and consulting teams, and unit CM/SW and Hospice if eligible. Recommendations based on the information gathered and discussed are outlined in the A/P of Palliative notes.

## 2021-12-03 NOTE — PROGRESS NOTE ADULT - SUBJECTIVE AND OBJECTIVE BOX
OVERNIGHT EVENTS:  - Hypoglycemia and given an AMP  - ABG overnight: 7.25    SUBJECTIVE:     VITAL SIGNS:  T(F): 97.5 (21 @ 10:00)  HR: 52 (21 @ 13:00)  BP: 114/56 (21 @ 13:00)  RR: 18 (21 @ 13:00)  SpO2: 94% (21 @ 13:00)  Wt(kg): --      21 @ 07:01  -  21 @ 07:00  --------------------------------------------------------  IN: 1442 mL / OUT: 910 mL / NET: 532 mL    21 @ 07:01  -  21 @ 13:29  --------------------------------------------------------  IN: 343.4 mL / OUT: 235 mL / NET: 108.4 mL        Drips:    Lines/tubes:      PHYSICAL EXAM:    Constitutional: Laying in bed, no acute distress, appears stated age, well nurished   Neurological: AAOx3, answers all questions appropriately, CN Grossly intact, no FNDs, can move all 4 extremities  HEENT: PERRL, EOMI, sclera non-icteric, neck supple, no masses, no JVD, MMM, good dentition  Respiratory: No labored breathing or respiratory distress, CTA b/l, good air entry b/l, no wheezing,  no crackles/rales, without accessory muscle use and no intercostal retractions  Cardiovascular: RRR, normal S1S2, no murmurs or rubs   Gastrointestinal: soft, non tender, non distended, neg hepatojugular reflex, no masses or overt organomegaly palpable, BS normal  Extremities: Warm, well perfused, pulses equal bilateral upper and lower extremities, no edema, no clubbing, no evidence of cyanosis   Skin: Normal temperature, warm, dry. No rashes or lesions     MEDICATIONS  (STANDING):  chlorhexidine 0.12% Liquid 15 milliLiter(s) Oral Mucosa every 12 hours  chlorhexidine 2% Cloths 1 Application(s) Topical <User Schedule>  dexAMETHasone  Injectable 6 milliGRAM(s) IV Push every 24 hours  dextrose 40% Gel 15 Gram(s) Oral once  dextrose 5%. 1000 milliLiter(s) (50 mL/Hr) IV Continuous <Continuous>  dextrose 5%. 1000 milliLiter(s) (100 mL/Hr) IV Continuous <Continuous>  dextrose 50% Injectable 25 Gram(s) IV Push once  dextrose 50% Injectable 12.5 Gram(s) IV Push once  dextrose 50% Injectable 25 Gram(s) IV Push once  enoxaparin Injectable 40 milliGRAM(s) SubCutaneous every 24 hours  fentaNYL   Infusion. 0.5 MICROgram(s)/kG/Hr (4.23 mL/Hr) IV Continuous <Continuous>  furosemide   Injectable 40 milliGRAM(s) IV Push once  glucagon  Injectable 1 milliGRAM(s) IntraMuscular once  insulin lispro (ADMELOG) corrective regimen sliding scale   SubCutaneous every 6 hours  levothyroxine Injectable 37.5 MICROGram(s) IV Push at bedtime  norepinephrine Infusion 0.05 MICROgram(s)/kG/Min (3.97 mL/Hr) IV Continuous <Continuous>  pantoprazole  Injectable 40 milliGRAM(s) IV Push every 24 hours  piperacillin/tazobactam IVPB.. 3.375 Gram(s) IV Intermittent every 6 hours  propofol Infusion 10 MICROgram(s)/kG/Min (5.08 mL/Hr) IV Continuous <Continuous>  sodium zirconium cyclosilicate 10 Gram(s) Oral every 8 hours  sodium zirconium cyclosilicate 10 Gram(s) Oral once    Allergies    Allergy Status Unknown    Intolerances    LABS:                        11.1   11.65 )-----------( 301      ( 03 Dec 2021 07:00 )             34.0     12-03    137  |  104  |  52<H>  ----------------------------<  139<H>  5.6<H>   |  19<L>  |  2.34<H>    Ca    7.7<L>      03 Dec 2021 06:59  Phos  6.9     12-03  Mg     2.1     12-03    TPro  5.6<L>  /  Alb  2.5<L>  /  TBili  0.8  /  DBili  x   /  AST  553<H>  /  ALT  489<H>  /  AlkPhos  111  12-      Urinalysis Basic - ( 01 Dec 2021 13:41 )    Color: Yellow / Appearance: Clear / S.025 / pH: x  Gluc: x / Ketone: NEGATIVE  / Bili: Negative / Urobili: 1.0 E.U./dL   Blood: x / Protein: 100 mg/dL / Nitrite: NEGATIVE   Leuk Esterase: NEGATIVE / RBC: 5-10 /HPF / WBC < 5 /HPF   Sq Epi: x / Non Sq Epi: 0-5 /HPF / Bacteria: Many /HPF          RADIOLOGY & ADDITIONAL TESTS:  Reviewed    MICRO:    OVERNIGHT EVENTS:  - Hypoglycemia and given an AMP  - ABG overnight: 7.//98/    SUBJECTIVE: Intubated and sedated    VITAL SIGNS:  T(F): 97.5 (21 @ 10:00)  HR: 52 (21 @ 13:00)  BP: 114/56 (21 @ 13:00)  RR: 18 (21 @ 13:00)  SpO2: 94% (21 @ 13:00)    21 @ 07:01  -  21 @ 07:00  --------------------------------------------------------  IN: 1442 mL / OUT: 910 mL / NET: 532 mL    21 @ 07:01  -  21 @ 13:29  --------------------------------------------------------  IN: 343.4 mL / OUT: 235 mL / NET: 108.4 mL    Drips:  Fentanyl @3  Propofol @ 40  Levo @0.1    Lines/tubes:  Right radial A line  Left IJ TLC  PIV x2  Mitchell    PHYSICAL EXAM:    Constitutional: Laying in bed, proned, intubated and sedated   Neurological: Unable to assess   HEENT:  neck supple, no masses, no JVD  Respiratory: In sync w/ vent, minor crackles bl, otherwise clear   Cardiovascular: RRR, normal S1S2, no murmurs or rubs   Gastrointestinal: patient laying on stomach, could not assess   Extremities: Extremities are more warm distally in all 4 extremities, weak distal pulses  Skin: Patients feet, toes and fingers now appear mottled and dark     MEDICATIONS  (STANDING):  chlorhexidine 0.12% Liquid 15 milliLiter(s) Oral Mucosa every 12 hours  chlorhexidine 2% Cloths 1 Application(s) Topical <User Schedule>  dexAMETHasone  Injectable 6 milliGRAM(s) IV Push every 24 hours  dextrose 40% Gel 15 Gram(s) Oral once  dextrose 5%. 1000 milliLiter(s) (50 mL/Hr) IV Continuous <Continuous>  dextrose 5%. 1000 milliLiter(s) (100 mL/Hr) IV Continuous <Continuous>  dextrose 50% Injectable 25 Gram(s) IV Push once  dextrose 50% Injectable 12.5 Gram(s) IV Push once  dextrose 50% Injectable 25 Gram(s) IV Push once  enoxaparin Injectable 40 milliGRAM(s) SubCutaneous every 24 hours  fentaNYL   Infusion. 0.5 MICROgram(s)/kG/Hr (4.23 mL/Hr) IV Continuous <Continuous>  furosemide   Injectable 40 milliGRAM(s) IV Push once  glucagon  Injectable 1 milliGRAM(s) IntraMuscular once  insulin lispro (ADMELOG) corrective regimen sliding scale   SubCutaneous every 6 hours  levothyroxine Injectable 37.5 MICROGram(s) IV Push at bedtime  norepinephrine Infusion 0.05 MICROgram(s)/kG/Min (3.97 mL/Hr) IV Continuous <Continuous>  pantoprazole  Injectable 40 milliGRAM(s) IV Push every 24 hours  piperacillin/tazobactam IVPB.. 3.375 Gram(s) IV Intermittent every 6 hours  propofol Infusion 10 MICROgram(s)/kG/Min (5.08 mL/Hr) IV Continuous <Continuous>  sodium zirconium cyclosilicate 10 Gram(s) Oral every 8 hours  sodium zirconium cyclosilicate 10 Gram(s) Oral once    Allergies    Allergy Status Unknown    Intolerances    LABS:                        11.1   11.65 )-----------( 301      ( 03 Dec 2021 07:00 )             34.0     12-    137  |  104  |  52<H>  ----------------------------<  139<H>  5.6<H>   |  19<L>  |  2.34<H>    Ca    7.7<L>      03 Dec 2021 06:59  Phos  6.9     12-  Mg     2.1     12-    TPro  5.6<L>  /  Alb  2.5<L>  /  TBili  0.8  /  DBili  x   /  AST  553<H>  /  ALT  489<H>  /  AlkPhos  111  12-      Urinalysis Basic - ( 01 Dec 2021 13:41 )    Color: Yellow / Appearance: Clear / S.025 / pH: x  Gluc: x / Ketone: NEGATIVE  / Bili: Negative / Urobili: 1.0 E.U./dL   Blood: x / Protein: 100 mg/dL / Nitrite: NEGATIVE   Leuk Esterase: NEGATIVE / RBC: 5-10 /HPF / WBC < 5 /HPF   Sq Epi: x / Non Sq Epi: 0-5 /HPF / Bacteria: Many /HPF

## 2021-12-03 NOTE — CONSULT NOTE ADULT - PROBLEM SELECTOR RECOMMENDATION 5
.  Patient is Full Code  -introduced the Palliative Care team to patient's wife (surrogate decision maker)  -will continue to follow for support and GOC

## 2021-12-03 NOTE — CONSULT NOTE ADULT - ASSESSMENT
Full Note to Follow    ·	introduced role of Palliative Medicine for support and advanced care planning  ·	no decisions made but built rapport for future GOC discussion 88yo M with PMH of HTN, Hypothyroidism, on Anticoagulation p/w respiratory failure and found to have COVID. Palliative consulted for complex medical decision making in the setting of critical illness / ICU trigger.    ·	introduced role of Palliative Medicine for support and advanced care planning  ·	no decisions made but built rapport for future GOC discussion

## 2021-12-03 NOTE — CONSULT NOTE ADULT - PROBLEM SELECTOR RECOMMENDATION 3
.  Worsening Cr, Oliguria  -trend Cr, avoid nephrotoxins  -hopefully will plateau, need for RRT would be a poor prognostic indicator

## 2021-12-03 NOTE — CONSULT NOTE ADULT - PROBLEM SELECTOR RECOMMENDATION 6
.  Complex medical decision making in the setting of critical illness.    Will continue to follow for ongoing GOC discussion.   Emotional support provided, questions answered.  Active Psychosocial Referrals: SW    Coping: [ ] well [ ] with difficulty [ ] poor coping [x] unable to assess  Support system: [ ] strong [x] adequate [ ] inadequate    For new or uncontrolled symptoms, please call Palliative Care at [phone]. The service is available 24/7 (including nights & weekends) to provide symptom management recommendations over the phone as appropriate

## 2021-12-03 NOTE — PROGRESS NOTE ADULT - ASSESSMENT
86 y/o male with HTN, gout, hypothyroid, and previous TIA on xarelto, and with a recent diagnosis of Covid-19 2 days ago who is presenting with acute altered mental status change and HD instability, found to have b/l pulmonary consolidations on CT scan, now intubated for respiratory distress, started on levophed and antibiotics    NEURO  Patient AOx0 in the ED, aggitated, acute mental status change per coworkers  Patient initially hypertensive and tachycardic but later became profoundly hypotensive   Presumed to be due to sepsis in the setting of covid pneumonia and possible super-imposed bacterial infection  - Patient now intubated and sedated and on pressors   -  CAROLYN goal -2     #Prior TIA  Patient reportedly taking  xarelto for this, discussed with his wife  - Will hold in case he may need procedure  - Also should consider substituting for ASA later      CARDS  #Shock 2/2 sepsis   Meeting 4/4 SIRS criteria in the ED  Hypotensive with MAPS in 50s  Off vasopressin now and coming down on levophed  - Levo with MAP goals 60-65, wean as tolerated  - Holding home lisinopril   - Vanc level supratherapeutic, vanc held today  - Renally dosed zosyn     #HLD  - holding home statin in setting of transaminitis     PULM  #AHRF in the setting of COVID PNA  CT scan with b/l consolidations  Hypoxic into the 70s, altered, meeting 4/4 sirs criteria  Intubated and sedated on propofol and fentanyl  Patient tested + for covid on Monday and apparently is vaccinated   LFTs elevated today   Good UOP, roughly net even ~50-60cc/hr overnight  AB.25/44/98/10 w/ vent settings ACVC/480/RR18/FiO2 50%/PEEP12   P/F ratio of 200 while patient proned  Patient supined today and PEEP decreased to 9, repeat ABG showing P/F ratio 160  Bx cultures neg  - Lung challenge today turning down FiO2 to 47 and Peep to 7  - Recheck ABG @ 3pm  - will prone again later    - sputum culture   - Remdesivir held in setting of transaminitis   - CW Decadron   - CW Vanco/zosyn  - FU vanc level at 3  - Will repeat ABG on new vent settings if clinically worsens   - Toculizumab x1 on   - 40mg IV Lasix push given as patient is +2 L since admission    GI  # transaminitis   In the setting of sepsis  stable,  initiation of remdesivir may be contributing  - hold remdesivir  and statin   - ctm     #Nutrition  - NGT today and will start tube feeds     RENAL  # MARISABEL most likely ATN from shock state   Baseline of 1.1 and came up to 2.4  A/w several electrolyte abnormalities - K 5.6, Phos 6.9  UOP ~30-40cc/hr   - Lokelma TID  - One 40mg IVP Lasix given  - considering starting slevemer   - Holding vanco right now to preserve renal function      HEME  No active issues    Endo  #Hypothyroid  Takes home synthroid  - c/w this med     #Gout  - Hold allopurinol     F: None   E: Replete as necessary K>4 Mg>2  N: NPO, will consider initiating nutrition   DVT Prophylaxis: Lovenox 40mg daily   GI prophylaxis: IV PPI  CODE STATUS: FULL

## 2021-12-03 NOTE — CHART NOTE - NSCHARTNOTEFT_GEN_A_CORE
Patient proned at 1645 with Dr. Bhakta, fellow, NP x 2 and RN at bedside   Bony prominences dressed with foam and offloaded with pillows  EKG leads replaced on posterior chest   no evidence of pressure injury   Airway, central line & arterial line in place & patent.  Patient tolerated procedure well and without complication

## 2021-12-04 NOTE — PROGRESS NOTE ADULT - ASSESSMENT
86 y/o male with HTN, gout, hypothyroid, and previous TIA on xarelto, and with a recent diagnosis of Covid-19 2 days ago who is presenting with acute altered mental status change and HD instability, found to have b/l pulmonary consolidations on CT scan, now intubated for respiratory distress, started on levophed and antibiotics    NEURO  Patient AOx0 in the ED, aggitated, acute mental status change per coworkers  Patient initially hypertensive and tachycardic but later became profoundly hypotensive   Presumed to be due to sepsis in the setting of covid pneumonia and possible super-imposed bacterial infection  - Patient now intubated and sedated and on pressors   -  CAROLYN goal -2     #Prior TIA  Patient reportedly taking  xarelto for this, discussed with his wife  - Will hold in case he may need procedure  - Also should consider substituting for ASA later      CARDS  #Shock 2/2 sepsis   Meeting 4/4 SIRS criteria in the ED  Hypotensive with MAPS in 50s  Off vasopressin now and coming down on levophed  - Levo with MAP goals 60-65, wean as tolerated  - Holding home lisinopril   - Vanco q24hr, daily levels   - Renally dosed zosyn     #HLD  - holding home statin in setting of transaminitis     PULM  #AHRF in the setting of COVID PNA  CT scan with b/l consolidations  Hypoxic into the 70s, altered, meeting 4/4 sirs criteria  Intubated and sedated on propofol and fentanyl  Patient tested + for covid on Monday and apparently is vaccinated   LFTs elevated   Good UOP  Bx cultures neg  ABG w/ P/F ratio <150 while proned overnight  - Deproned  - Lung challenge today turning down FiO2 to 37 and Peep to 7  - Recheck ABG @ 4pm  - Replaced A line   - will prone again later    - sputum culture   - Remdesivir held in setting of transaminitis   - CW Decadron   - CW Vanco/zosyn  - FU vanc level at 3  - Will repeat ABG on new vent settings if clinically worsens   - Toculizumab x1 on 12/1    GI  # transaminitis   In the setting of sepsis  stable,  initiation of remdesivir may be contributing  - hold remdesivir  and statin   - ctm     #Nutrition  - NGT today and will start tube feeds     RENAL  # MARISABEL most likely ATN from shock state   Baseline of 1.1 and came up to 2.4  A/w several electrolyte abnormalities - K 5.6, Phos 6.9  UOP ~30-40cc/hr   - Lokelma TID now via NGT  - For Hyperkalemia given calcium gluconate, insulin and dextrose   - considering starting slevemer     HEME  No active issues    Endo  #Hypothyroid  Takes home synthroid  - c/w this med     #Gout  - Hold allopurinol     F: None   E: Replete as necessary K>4 Mg>2  N: NPO, will consider initiating nutrition   DVT Prophylaxis: Lovenox 40mg daily   GI prophylaxis: IV PPI  CODE STATUS: FULL

## 2021-12-04 NOTE — PROGRESS NOTE ADULT - SUBJECTIVE AND OBJECTIVE BOX
OVERNIGHT EVENTS:   - Lost A line  - Went up on levophed  - Lactate cleared  - Repeat ABG while proned w/ P/F ratio <150    SUBJECTIVE:   Intubated and sedated     VITAL SIGNS:  T(F): 96.8 (12-04-21 @ 13:15)  HR: 49 (12-04-21 @ 15:00)  BP: 128/60 (12-04-21 @ 15:00)  RR: 16 (12-04-21 @ 15:00)  SpO2: 95% (12-04-21 @ 15:00)    12-03-21 @ 07:01  -  12-04-21 @ 07:00  --------------------------------------------------------  IN: 909 mL / OUT: 1935 mL / NET: -1026 mL    12-04-21 @ 07:01  -  12-04-21 @ 15:25  --------------------------------------------------------  IN: 325.8 mL / OUT: 545 mL / NET: -219.2 mL    PHYSICAL EXAM:    Constitutional: Laying in bed, proned, intubated and sedated   Neurological: Unable to assess   HEENT:  neck supple, no masses, no JVD  Respiratory: In sync w/ vent, minor crackles bl, otherwise clear   Cardiovascular: RRR, normal S1S2, no murmurs or rubs   Gastrointestinal: patient laying on stomach, could not assess   Extremities: Extremities are more warm distally in all 4 extremities, weak distal pulses  Skin: Patients feet, toes and fingers now appear mottled and dark     MEDICATIONS  (STANDING):  chlorhexidine 0.12% Liquid 15 milliLiter(s) Oral Mucosa every 12 hours  chlorhexidine 2% Cloths 1 Application(s) Topical <User Schedule>  dexAMETHasone  Injectable 6 milliGRAM(s) IV Push every 24 hours  dextrose 40% Gel 15 Gram(s) Oral once  dextrose 5%. 1000 milliLiter(s) (50 mL/Hr) IV Continuous <Continuous>  dextrose 5%. 1000 milliLiter(s) (100 mL/Hr) IV Continuous <Continuous>  dextrose 50% Injectable 25 Gram(s) IV Push once  dextrose 50% Injectable 12.5 Gram(s) IV Push once  dextrose 50% Injectable 25 Gram(s) IV Push once  enoxaparin Injectable 40 milliGRAM(s) SubCutaneous every 24 hours  fentaNYL   Infusion. 0.5 MICROgram(s)/kG/Hr (4.23 mL/Hr) IV Continuous <Continuous>  glucagon  Injectable 1 milliGRAM(s) IntraMuscular once  insulin lispro (ADMELOG) corrective regimen sliding scale   SubCutaneous every 6 hours  levothyroxine Injectable 37.5 MICROGram(s) IV Push at bedtime  norepinephrine Infusion 0.05 MICROgram(s)/kG/Min (3.97 mL/Hr) IV Continuous <Continuous>  pantoprazole  Injectable 40 milliGRAM(s) IV Push every 24 hours  piperacillin/tazobactam IVPB.. 3.375 Gram(s) IV Intermittent every 6 hours  propofol Infusion 10 MICROgram(s)/kG/Min (5.08 mL/Hr) IV Continuous <Continuous>  sodium zirconium cyclosilicate 10 Gram(s) Oral every 8 hours    MEDICATIONS  (PRN):      Allergies    Allergy Status Unknown    Intolerances    LABS:                        11.9   11.59 )-----------( 307      ( 04 Dec 2021 08:40 )             37.5     12-04    141  |  109<H>  |  57<H>  ----------------------------<  147<H>  5.8<H>   |  18<L>  |  2.05<H>    Ca    7.6<L>      04 Dec 2021 08:40  Phos  6.9     12-03  Mg     2.4     12-04    TPro  5.8<L>  /  Alb  2.6<L>  /  TBili  0.6  /  DBili  x   /  AST  220<H>  /  ALT  395<H>  /  AlkPhos  98  12-04

## 2021-12-05 NOTE — PROGRESS NOTE ADULT - ASSESSMENT
86 y/o male with HTN, gout, hypothyroid, and previous TIA on xarelto, and with a recent diagnosis of Covid-19 2 days ago who is presenting with acute altered mental status change and HD instability, found to have b/l pulmonary consolidations on CT scan, now intubated for respiratory distress, started on levophed and antibiotics. Transitioned to Precedex and weaned off of Fentanyl and propofol, to wake up pt for a CPAP trial    NEURO  Patient AOx0 in the ED, aggitated, acute mental status change per coworkers  Patient initially hypertensive and tachycardic but later became profoundly hypotensive   Presumed to be due to sepsis in the setting of covid pneumonia and possible super-imposed bacterial infection  - Patient now intubated and sedated and on pressors   - CAROLYN goal -2   - Transitioned to Precedex and weaned off of Fentanyl and propofol, to wake up pt for a CPAP trial    #Prior TIA  Patient reportedly taking  xarelto for this, discussed with his wife  - Will hold in case he may need procedure  - Also should consider substituting for ASA later      CARDS  #Shock 2/2 sepsis   Meeting 4/4 SIRS criteria in the ED  Hypotensive with MAPS in 50s  Off vasopressin now and coming down on levophed  - Levo with MAP goals 60-65, wean as tolerated  - Holding home lisinopril   - Vanco q24hr, daily levels   - Renally dosed zosyn     #HLD  - holding home statin in setting of transaminitis     PULM  #AHRF in the setting of COVID PNA  CT scan with b/l consolidations  Hypoxic into the 70s, altered, meeting 4/4 sirs criteria  Intubated and sedated on propofol and fentanyl  Patient tested + for covid on Monday and apparently is vaccinated   LFTs elevated   Good UOP  Bx cultures neg  ABG w/ P/F ratio <150 while proned overnight  - Deproned  - Lung challenge today turning down FiO2 to 37 and Peep to 7  - Recheck ABG @ 4pm  - Replaced A line   - will prone again later    - sputum culture   - Remdesivir held in setting of transaminitis   - CW Decadron   - CW Vanco/zosyn  - FU vanc level at 3  - Will repeat ABG on new vent settings if clinically worsens   - Toculizumab x1 on 12/1    GI  # transaminitis   In the setting of sepsis  stable,  initiation of remdesivir may be contributing  - hold remdesivir and statin   - ctm     #Nutrition  - NGT today and will start tube feeds     RENAL  # MARISABEL most likely ATN from shock state   Baseline of 1.1 and came up to 2.4  A/w several electrolyte abnormalities - K 5.6, Phos 6.9  UOP ~30-40cc/hr   - Lokelma TID now via NGT  - For Hyperkalemia given calcium gluconate, insulin and dextrose   - considering starting slevemer     HEME  No active issues    Endo  #Hypothyroid  Takes home synthroid  - c/w this med     #Gout  - Hold allopurinol     F: None   E: Replete as necessary K>4 Mg>2  N: NPO, will consider initiating nutrition   DVT Prophylaxis: Lovenox 40mg daily   GI prophylaxis: IV PPI  CODE STATUS: FULL

## 2021-12-05 NOTE — PROGRESS NOTE ADULT - SUBJECTIVE AND OBJECTIVE BOX
OVERNIGHT EVENTS: NAEO    SUBJECTIVE / INTERVAL HPI: Patient seen and examined at bedside.     Remaining ROS negative     PHYSICAL EXAM:  Constitutional: Laying in bed, proned, intubated and sedated   Neurological: Unable to assess   HEENT:  neck supple, no masses, no JVD  Respiratory: In sync w/ vent, minor crackles bl, otherwise clear   Cardiovascular: RRR, normal S1S2, no murmurs or rubs   Gastrointestinal: Soft, nontender, nondistended, +BS  Extremities: Extremities are more warm distally in all 4 extremities, weak distal pulses  Skin: Patients feet, toes and fingers now appear mottled and dark     VITAL SIGNS:  Vital Signs Last 24 Hrs  T(C): 37.4 (05 Dec 2021 14:14), Max: 37.4 (05 Dec 2021 14:14)  T(F): 99.3 (05 Dec 2021 14:14), Max: 99.3 (05 Dec 2021 14:14)  HR: 70 (05 Dec 2021 14:00) (49 - 70)  BP: 122/56 (05 Dec 2021 12:00) (115/55 - 137/63)  BP(mean): 80 (05 Dec 2021 12:00) (79 - 91)  RR: 9 (05 Dec 2021 14:00) (9 - 22)  SpO2: 94% (05 Dec 2021 14:00) (87% - 100%)    MEDICATIONS:  MEDICATIONS  (STANDING):  chlorhexidine 0.12% Liquid 15 milliLiter(s) Oral Mucosa every 12 hours  chlorhexidine 2% Cloths 1 Application(s) Topical <User Schedule>  dexAMETHasone  Injectable 6 milliGRAM(s) IV Push every 24 hours  dexMEDEtomidine Infusion 0.2 MICROgram(s)/kG/Hr (4.23 mL/Hr) IV Continuous <Continuous>  dextrose 40% Gel 15 Gram(s) Oral once  dextrose 5%. 1000 milliLiter(s) (50 mL/Hr) IV Continuous <Continuous>  dextrose 5%. 1000 milliLiter(s) (100 mL/Hr) IV Continuous <Continuous>  dextrose 50% Injectable 25 Gram(s) IV Push once  dextrose 50% Injectable 12.5 Gram(s) IV Push once  dextrose 50% Injectable 25 Gram(s) IV Push once  enoxaparin Injectable 40 milliGRAM(s) SubCutaneous every 24 hours  fentaNYL   Infusion. 0.5 MICROgram(s)/kG/Hr (4.23 mL/Hr) IV Continuous <Continuous>  glucagon  Injectable 1 milliGRAM(s) IntraMuscular once  insulin lispro (ADMELOG) corrective regimen sliding scale   SubCutaneous every 6 hours  levothyroxine Injectable 37.5 MICROGram(s) IV Push at bedtime  norepinephrine Infusion 0.05 MICROgram(s)/kG/Min (3.97 mL/Hr) IV Continuous <Continuous>  pantoprazole  Injectable 40 milliGRAM(s) IV Push every 24 hours  piperacillin/tazobactam IVPB.. 3.375 Gram(s) IV Intermittent every 6 hours  propofol Infusion 10 MICROgram(s)/kG/Min (5.08 mL/Hr) IV Continuous <Continuous>    ALLERGIES:  Allergy Status Unknown    LABS:                        11.5   9.36  )-----------( 267      ( 05 Dec 2021 05:42 )             34.7     12-05    145  |  114<H>  |  51<H>  ----------------------------<  124<H>  5.1   |  23  |  1.60<H>    Ca    8.2<L>      05 Dec 2021 05:42  Phos  4.5     12-05  Mg     2.5     12-05    TPro  5.4<L>  /  Alb  2.6<L>  /  TBili  0.6  /  DBili  x   /  AST  124<H>  /  ALT  295<H>  /  AlkPhos  88  12-05    CAPILLARY BLOOD GLUCOSE  POCT Blood Glucose.: 96 mg/dL (05 Dec 2021 11:48)    RADIOLOGY & ADDITIONAL TESTS: Reviewed.

## 2021-12-06 NOTE — CHART NOTE - NSCHARTNOTEFT_GEN_A_CORE
Reached out to patient's wife for ongoing emotional support and medical updates. Discussed recent developments and current plan of care (discussed with ICU earlier today). Wife is understanding and appreciative. Further exploration of GOC, wife states patient never had an explicit discussion regarding CPR/Cardiac arrest. She will deliberate in the mean time but remains hopeful for his recovery. I cautioned that if patient had a cardiac arrest on his current level of support, I would be worried that he would not return to his usual baseline. She was understanding and will remain available for updates and decision-making.    Chirag Finley, Palliative Care Attending  Questions/Concerns: Call [phone] (including Nights/Weekend) or message on [employer] Teams (Chirag Finley)        PALLIATIVE MEDICINE COORDINATION OF CARE DOCUMENTATION: [x] Inpatient Consult  Non-Face-to-Face prolonged service provided that relates to (face-to-face) care that has or will occur and ongoing patient management, including one or more of the following: -Documentation review from other physicians and health care professional services -Review of medical records and diagnostic/radiology study results -Coordination with patient's support system  ************************************************************************  MEDICATION REVIEW:  MEDICATIONS  (STANDING):  carvedilol 6.25 milliGRAM(s) Oral every 12 hours  chlorhexidine 0.12% Liquid 15 milliLiter(s) Oral Mucosa every 12 hours  chlorhexidine 2% Cloths 1 Application(s) Topical <User Schedule>  dexAMETHasone  Injectable 6 milliGRAM(s) IV Push every 24 hours  enoxaparin Injectable 40 milliGRAM(s) SubCutaneous every 24 hours  glucagon  Injectable 1 milliGRAM(s) IntraMuscular once  insulin lispro (ADMELOG) corrective regimen sliding scale   SubCutaneous every 6 hours  levothyroxine Injectable 37.5 MICROGram(s) IV Push at bedtime  pantoprazole   Suspension 40 milliGRAM(s) Enteral Tube every 24 hours  piperacillin/tazobactam IVPB.. 4.5 Gram(s) IV Intermittent every 6 hours  vancomycin  IVPB 1250 milliGRAM(s) IV Intermittent every 24 hours    MEDICATIONS  (PRN):  acetaminophen    Suspension .. 650 milliGRAM(s) Enteral Tube every 6 hours PRN Temp greater or equal to 38C (100.4F)    ISTOP REFERENCE: [id number]  - no active Rx's  - PRN usage: NO PRN'S  ------------------------------------------------------------------------  COORDINATION OF CARE:  - Palliative Care consulted for: [facility]  - Patient (to be) assessed: 12/3/21  - Patient previously seen by Palliative Care service: NO    ADVANCE CARE PLANNING  - Code status: Full Code  - MOLST reviewed in chart: NONE; None found on [facility]  - HCP/Surrogate: eduardo Schreiber, [phone]  - [facility] documents: NONE found on [place]  - HCP/Living will/Other Advanced Directives in [facility]: NONE found on [facility]  ------------------------------------------------------------------------  CARE PROVIDER DOCUMENTATION:  - SW/CM notes: Remains medically active  - ICU notes: Transitioned to Precedex and weaned off of Fentanyl and propofol, to wake up pt for a CPAP trial. Off sedation without mental status; if persists will need imaging    PLAN OF CARE  - Known admissions in past year:  - Current admit date: 12/1/21  - LOS: 5  - LACE score: 7  - Current dispo plan: TO BE DETERMINED  ------------------------------------------------------------------------  - Time Spent/Chart reviewed: 31 Minutes [including time used to gather, review and transfer data]  - Start: 2:30PM  - End: 3:01PM    Prolonged services rendered, as part of this patient's care provided by Palliative Medicine, include: i. chart review for provider and ancillary service documentation, ii. pertinent diagnostics including laboratory and imaging studies, iii. medication review including PRN use, iv. admission history including previous palliative care encounters and GOC notes, v. advance care planning documents including HCP and MOLST forms in [facility]. Part of Palliative Medicine extended evaluation and management also involves coordination of care with our IDT, the primary and consulting teams, and unit CM/SW and Hospice if eligible. Recommendations based on the information gathered and discussed are outlined in the A/P of Palliative notes.

## 2021-12-06 NOTE — PROGRESS NOTE ADULT - SUBJECTIVE AND OBJECTIVE BOX
OVERNIGHT EVENTS:  - HTN to 180s-200s, 10mg IV labeteolol given  - Still non responsive off all sedation    SUBJECTIVE:   - Intubated and not responsive     VITAL SIGNS:  T(F): 101.1 (12-06-21 @ 12:00)  HR: 78 (12-06-21 @ 13:00)  BP: 175/81 (12-06-21 @ 08:00)  RR: 18 (12-06-21 @ 13:00)  SpO2: 91% (12-06-21 @ 13:00)    12-05-21 @ 07:01  -  12-06-21 @ 07:00  --------------------------------------------------------  IN: 326.7 mL / OUT: 3030 mL / NET: -2703.3 mL    12-06-21 @ 07:01  -  12-06-21 @ 13:42  --------------------------------------------------------  IN: 730 mL / OUT: 820 mL / NET: -90 mL    Lines/tubes:  Left A line  Right TLC  [place]     PHYSICAL EXAM:    Constitutional: Laying in bed, intubated  Neurological:Off sedation, does not follow commands, does not respond/withdraw to pain, + cough    HEENT:  neck supple, no masses, no JVD  Respiratory: In sync w/ vent, minor crackles bl, otherwise clear   Cardiovascular: RRR, normal S1S2, no murmurs or rubs   Gastrointestinal: +BS, not distended, some belly breathing  Extremities: Extremities are more warm distally in all 4 extremities, weak distal pulses  Skin: Patients feet, toes and fingers now appear mottled and dark     MEDICATIONS  (STANDING):  carvedilol 6.25 milliGRAM(s) Oral every 12 hours  chlorhexidine 0.12% Liquid 15 milliLiter(s) Oral Mucosa every 12 hours  chlorhexidine 2% Cloths 1 Application(s) Topical <User Schedule>  dexAMETHasone  Injectable 6 milliGRAM(s) IV Push every 24 hours  dextrose 40% Gel 15 Gram(s) Oral once  dextrose 5%. 1000 milliLiter(s) (50 mL/Hr) IV Continuous <Continuous>  dextrose 5%. 1000 milliLiter(s) (100 mL/Hr) IV Continuous <Continuous>  dextrose 50% Injectable 12.5 Gram(s) IV Push once  dextrose 50% Injectable 25 Gram(s) IV Push once  dextrose 50% Injectable 25 Gram(s) IV Push once  enoxaparin Injectable 40 milliGRAM(s) SubCutaneous every 24 hours  glucagon  Injectable 1 milliGRAM(s) IntraMuscular once  insulin lispro (ADMELOG) corrective regimen sliding scale   SubCutaneous every 6 hours  levothyroxine Injectable 37.5 MICROGram(s) IV Push at bedtime  pantoprazole   Suspension 40 milliGRAM(s) Enteral Tube every 24 hours  piperacillin/tazobactam IVPB.. 4.5 Gram(s) IV Intermittent every 6 hours  vancomycin  IVPB 1250 milliGRAM(s) IV Intermittent every 24 hours    MEDICATIONS  (PRN):  acetaminophen    Suspension .. 650 milliGRAM(s) Enteral Tube every 6 hours PRN Temp greater or equal to 38C (100.4F)    Allergies    Allergy Status Unknown    Intolerances    LABS:                        12.0   11.04 )-----------( 273      ( 06 Dec 2021 04:38 )             36.1     12-06    150<H>  |  115<H>  |  44<H>  ----------------------------<  101<H>  4.4   |  27  |  1.32<H>    Ca    8.3<L>      06 Dec 2021 04:38  Phos  3.1     12-06  Mg     2.6     12-06    TPro  5.6<L>  /  Alb  2.9<L>  /  TBili  0.9  /  DBili  x   /  AST  107<H>  /  ALT  211<H>  /  AlkPhos  95  12-06    RADIOLOGY & ADDITIONAL TESTS:  Reviewed

## 2021-12-06 NOTE — PROGRESS NOTE ADULT - ASSESSMENT
86 y/o male with HTN, gout, hypothyroid, and previous TIA on xarelto, and with a recent diagnosis of Covid-19 2 days ago who is presenting with acute altered mental status change and HD instability, found to have b/l pulmonary consolidations on CT scan, now intubated for respiratory distress, started on levophed and antibiotics, patient now off pressors and more stable, pending improvement in MS for possible extubation.     NEURO  #Patient AOx0 in the ED, agitated acute mental status change per coworkers  Patient initially hypertensive and tachycardic but later became profoundly hypotensive   Presumed to be due to sepsis in the setting of covid pneumonia and possible super-imposed bacterial infection  was on several pressors as well as sedatives for several days, now off all drips  - Patient still intubated, no longer being sedated with drips however has no alertness, does not withdraw from pain or follow commands   - Once more alert can attempt CPAP trials  - CTH if continues to not have MS     #Prior TIA  Patient reportedly taking xarelto for this, discussed with his wife  - Will hold in case he may need procedure  - Also should consider substituting for ASA later      CARDS  #Shock 2/2 sepsis- Resolving   Meeting 4/4 SIRS criteria in the ED  Hypotensive with MAPS in 50s  Now off all pressors   - Holding home lisinopril   - Vanco q24hr, daily levels   - Zosyn and given Vanco x1 today     #HTN  Patients pressures increasing off pressors  Unable to take home ACE given MARISABEL  Given IV Labetelol overnight- started coreg this morning     #HLD  - holding home statin in setting of transaminitis     PULM  #AHRF in the setting of COVID PNA  CT scan with b/l consolidations  Hypoxic into the 70s, altered, meeting 4/4 sirs criteria  Intubated and sedated on propofol and fentanyl  Patient tested + for covid on Monday and apparently is vaccinated   LFTs elevated   Good UOP, -2.5L/24hr   Bx cultures neg  P/F ratios improving   - Deproned  - will prone again later    - f/u sputum culture   - Remdesivir held in setting of transaminitis   - CW Decadron   - CW Vanco/zosyn  - Toculizumab x1 on 12/1    GI  # transaminitis   In the setting of sepsis  stable,  initiation of remdesivir may be contributing  - hold remdesivir and statin   - ctm     #Nutrition  - NGT w/tube feeds     RENAL  # MARISABEL most likely ATN from shock state- IMPROVING   Baseline of 1.1 and came up to 2.4, now close to back at baseline  A/w several electrolyte abnormalities - K 5.6, Phos 6.9 all resolved   - CTM    #Hypernatremia  Na 150  - 250 free water flushes q6     HEME  No active issues    Endo  #Hypothyroid  Takes home synthroid  - c/w this med   #Gout  - Hold allopurinol     F: None   E: Replete as necessary K>4 Mg>2  N: Tube feeds   DVT Prophylaxis: Lovenox 40mg daily   GI prophylaxis: IV PPI  CODE STATUS: FULL

## 2021-12-07 NOTE — PROCEDURE NOTE - NSINDICATIONS_GEN_A_CORE
critical illness/emergency venous access
double pressor therapy/arterial puncture to obtain ABG's/cannulation purposes/critical patient/monitoring purposes
blood sampling/critical patient/monitoring purposes
feeds

## 2021-12-07 NOTE — PROCEDURE NOTE - NSTOLERANCE_GEN_A_CORE
Patient tolerated procedure well.
Patient tolerated procedure well.
independent
Patient tolerated procedure well.
Patient tolerated procedure well.

## 2021-12-07 NOTE — PROCEDURE NOTE - NSICDXPROCEDURE_GEN_ALL_CORE_FT
PROCEDURES:  Insertion, needle, vein 07-Dec-2021 23:25:56  Brenda Montemayor  Ultrasound guidance for vascular access 07-Dec-2021 23:26:04  Brenda Montemayor   PROCEDURES:  Insertion, needle, vein 07-Dec-2021 23:25:56  Brenda Montemayor  Ultrasound guidance for vascular access 07-Dec-2021 23:26:04  Brenda Montemayor  Ultrasound guidance for placement of central venous catheter (CVC) 07-Dec-2021 23:31:17  Brenda Montemayor

## 2021-12-07 NOTE — PROGRESS NOTE ADULT - ASSESSMENT
88 y/o male with HTN, gout, hypothyroid, and previous TIA on xarelto, and with a recent diagnosis of Covid-19 on 11/29 who presented with acute altered mental status change, HD instability, and hypoxia 2/2 covid pneumonia with superimposed bacterial pneumonia, found to have b/l pulmonary consolidations on CT scan, s/p intubation, started on multiple pressors and antibiotics, patient now off pressors and more stable, pending improvement in MS for possible extubation.     NEURO  #Patient AOx0 in the ED, agitated acute mental status change per coworkers  Patient initially hypertensive and tachycardic but later became profoundly hypotensive   Presumed to be due to sepsis in the setting of covid pneumonia and possible super-imposed bacterial infection  was on several pressors as well as sedatives for several days, now off all pressors and on minimal sedation with precedex  - Patient still intubated, Off all sedation yesterday and overnight, however had no alertness, does not withdraw from pain or following commands   - Today opening eyes, triggering the vent on CPAP trial   - CTH if continues to not improve his MS     #Prior TIA  Patient reportedly taking xarelto for this, discussed with his wife  - Will hold in case he may need procedure  - Also should consider substituting for ASA later      CARDS  #Shock 2/2 sepsis- Resolving   Meeting 4/4 SIRS criteria in the ED  Hypotensive with MAPS in 50s  Now off all pressors   - Restarting home lisinopril now that MARISABEL resolved   - Will continue zosyn for 10 day course (7/10)    #HTN  Patients pressures increasing off pressors  Unable to take home ACE given MARISABEL  Given IV Labetelol agaib overnight  - restarted lisinopril  - dc coreg     #HLD  - holding home statin in setting of transaminitis     PULM  #AHRF in the setting of COVID PNA  CT scan with b/l consolidations  Hypoxic into the 70s, altered, meeting 4/4 sirs criteria  Intubated. Patient tested + for covid on 11/29 and apparently is vaccinated   LFTs elevated. Good UOP, net even. Bx cultures neg. P/F ratios improved   - Tolerating CPAP trials well, need further improvement in MS prior to extubation however  - ABG showing Resp Alkalosis and Met Alkalosis   - Deproned  - f/u sputum culture   - Remdesivir held in setting of transaminitis   - CW Decadron (day 7/10)    - CW zosyn until 12/10  - s/p Toculizumab x1 on 12/1    GI  # transaminitis   In the setting of sepsis  improving, initiation of remdesivir may be contributing  - hold remdesivir and statin   - ctm     #Nutrition  - NGT w/tube feeds     RENAL  # MARISABEL most likely ATN from shock state- Resolved   Baseline of 1.1 and came up to 2.4, now close to back at baseline  A/w several electrolyte abnormalities - K 5.6, Phos 6.9 all resolved   - CTM    #Hypernatremia- Worsening  Na 150--> 153  Free water deficit is 3.9L  - 250 free water flushes q6 ---> 300cc q4     HEME  No active issues    Endo  #Hypothyroid  Takes home synthroid  - c/w this med     #Gout  - Hold allopurinol     F: None   E: Replete as necessary K>4 Mg>2  N: Tube feeds   DVT Prophylaxis: Lovenox 40mg daily   GI prophylaxis: IV PPI  CODE STATUS: FULL

## 2021-12-07 NOTE — PROCEDURE NOTE - NSPROCNAME_GEN_A_CORE
Gastric Intubation/Gastric Lavage
Arterial Puncture/Cannulation
Arterial Puncture/Cannulation
Central Line Insertion

## 2021-12-07 NOTE — PROGRESS NOTE ADULT - SUBJECTIVE AND OBJECTIVE BOX
OVERNIGHT EVENTS:  - Pulled karimi, bladder scans revealed urinary retention of 1000cc x2 and 600 x1 overnight requiring straight cath  - Patient appeared uncomfortable at times and became hypertensive most likely 2/2 urinay retention requiring 10mg IV labetelol     SUBJECTIVE:   Intubated and sedated     VITAL SIGNS:  T(F): 100 (12-07-21 @ 09:37)  HR: 55 (12-07-21 @ 13:00)  BP: 99/51 (12-07-21 @ 13:00)  RR: 21 (12-07-21 @ 13:00)  SpO2: 94% (12-07-21 @ 13:00)    12-06-21 @ 07:01  -  12-07-21 @ 07:00  --------------------------------------------------------  IN: 3189.4 mL / OUT: 3480 mL / NET: -290.6 mL    12-07-21 @ 07:01  -  12-07-21 @ 13:43  --------------------------------------------------------  IN: 1000.2 mL / OUT: 800 mL / NET: 200.2 mL    Drips:    Lines/tubes:      PHYSICAL EXAM:    Constitutional: Laying in bed, no acute distress, appears stated age, well nurished   Neurological: AAOx3, answers all questions appropriately, CN Grossly intact, no FNDs, can move all 4 extremities  HEENT: PERRL, EOMI, sclera non-icteric, neck supple, no masses, no JVD, MMM, good dentition  Respiratory: No labored breathing or respiratory distress, CTA b/l, good air entry b/l, no wheezing,  no crackles/rales, without accessory muscle use and no intercostal retractions  Cardiovascular: RRR, normal S1S2, no murmurs or rubs   Gastrointestinal: soft, non tender, non distended, neg hepatojugular reflex, no masses or overt organomegaly palpable, BS normal  Extremities: Warm, well perfused, pulses equal bilateral upper and lower extremities, no edema, no clubbing, no evidence of cyanosis   Skin: Normal temperature, warm, dry. No rashes or lesions     MEDICATIONS  (STANDING):  chlorhexidine 0.12% Liquid 15 milliLiter(s) Oral Mucosa every 12 hours  chlorhexidine 2% Cloths 1 Application(s) Topical <User Schedule>  dexAMETHasone  Injectable 6 milliGRAM(s) IV Push every 24 hours  dexMEDEtomidine Infusion 0.2 MICROgram(s)/kG/Hr (4.23 mL/Hr) IV Continuous <Continuous>  dextrose 40% Gel 15 Gram(s) Oral once  dextrose 5%. 1000 milliLiter(s) (50 mL/Hr) IV Continuous <Continuous>  dextrose 5%. 1000 milliLiter(s) (100 mL/Hr) IV Continuous <Continuous>  dextrose 50% Injectable 25 Gram(s) IV Push once  dextrose 50% Injectable 12.5 Gram(s) IV Push once  dextrose 50% Injectable 25 Gram(s) IV Push once  enoxaparin Injectable 40 milliGRAM(s) SubCutaneous every 24 hours  glucagon  Injectable 1 milliGRAM(s) IntraMuscular once  insulin lispro (ADMELOG) corrective regimen sliding scale   SubCutaneous every 6 hours  levothyroxine Injectable 37.5 MICROGram(s) IV Push at bedtime  lisinopril 10 milliGRAM(s) Oral every 24 hours  pantoprazole   Suspension 40 milliGRAM(s) Enteral Tube every 24 hours  piperacillin/tazobactam IVPB.. 4.5 Gram(s) IV Intermittent every 6 hours  propofol Infusion 10 MICROgram(s)/kG/Min (5.08 mL/Hr) IV Continuous <Continuous>    MEDICATIONS  (PRN):  acetaminophen    Suspension .. 650 milliGRAM(s) Enteral Tube every 6 hours PRN Temp greater or equal to 38C (100.4F)      Allergies    Allergy Status Unknown    Intolerances        LABS:                        12.0   12.53 )-----------( 257      ( 07 Dec 2021 05:41 )             37.8     12-07    153<H>  |  119<H>  |  39<H>  ----------------------------<  147<H>  4.5   |  26  |  0.99    Ca    8.4      07 Dec 2021 05:41  Phos  3.1     12-07  Mg     2.5     12-07    TPro  5.2<L>  /  Alb  2.8<L>  /  TBili  0.7  /  DBili  x   /  AST  85<H>  /  ALT  141<H>  /  AlkPhos  101  12-07            RADIOLOGY & ADDITIONAL TESTS:  Reviewed    MICRO:    OVERNIGHT EVENTS:  - Pulled karimi, bladder scans revealed urinary retention of 1000cc x2 and 600 x1 overnight requiring straight cath  - Patient appeared uncomfortable at times and became hypertensive most likely 2/2 urinay retention requiring 10mg IV labetelol     SUBJECTIVE:   Intubated and sedated     VITAL SIGNS:  T(F): 100 (12-07-21 @ 09:37)  HR: 55 (12-07-21 @ 13:00)  BP: 99/51 (12-07-21 @ 13:00)  RR: 21 (12-07-21 @ 13:00)  SpO2: 94% (12-07-21 @ 13:00)    12-06-21 @ 07:01  -  12-07-21 @ 07:00  --------------------------------------------------------  IN: 3189.4 mL / OUT: 3480 mL / NET: -290.6 mL    12-07-21 @ 07:01  -  12-07-21 @ 13:43  --------------------------------------------------------  IN: 1000.2 mL / OUT: 800 mL / NET: 200.2 mL    Drips:   Precedex 0.4    Lines/tubes:  - TLC  - Karimi  - PIV x2    PHYSICAL EXAM:    Constitutional: Laying in bed, intubated  Neurological: On minimal sedation, does not follow commands, does not respond/withdraw to pain, + cough/gag, opening eyes minimally intermittently, triggering breathes on the vent  HEENT:  neck supple, no masses, no JVD  Respiratory: In sync w/ vent, minor crackles bl, otherwise clear   Cardiovascular: RRR, normal S1S2, no murmurs or rubs   Gastrointestinal: +BS, not distended, not rigid  Extremities: Extremities are more warm distally in all 4 extremities, weak distal pulses  Skin: Patients feet, toes and fingers now appear mottled and dark     MEDICATIONS  (STANDING):  chlorhexidine 0.12% Liquid 15 milliLiter(s) Oral Mucosa every 12 hours  chlorhexidine 2% Cloths 1 Application(s) Topical <User Schedule>  dexAMETHasone  Injectable 6 milliGRAM(s) IV Push every 24 hours  dexMEDEtomidine Infusion 0.2 MICROgram(s)/kG/Hr (4.23 mL/Hr) IV Continuous <Continuous>  dextrose 40% Gel 15 Gram(s) Oral once  dextrose 5%. 1000 milliLiter(s) (50 mL/Hr) IV Continuous <Continuous>  dextrose 5%. 1000 milliLiter(s) (100 mL/Hr) IV Continuous <Continuous>  dextrose 50% Injectable 25 Gram(s) IV Push once  dextrose 50% Injectable 12.5 Gram(s) IV Push once  dextrose 50% Injectable 25 Gram(s) IV Push once  enoxaparin Injectable 40 milliGRAM(s) SubCutaneous every 24 hours  glucagon  Injectable 1 milliGRAM(s) IntraMuscular once  insulin lispro (ADMELOG) corrective regimen sliding scale   SubCutaneous every 6 hours  levothyroxine Injectable 37.5 MICROGram(s) IV Push at bedtime  lisinopril 10 milliGRAM(s) Oral every 24 hours  pantoprazole   Suspension 40 milliGRAM(s) Enteral Tube every 24 hours  piperacillin/tazobactam IVPB.. 4.5 Gram(s) IV Intermittent every 6 hours  propofol Infusion 10 MICROgram(s)/kG/Min (5.08 mL/Hr) IV Continuous <Continuous>    MEDICATIONS  (PRN):  acetaminophen    Suspension .. 650 milliGRAM(s) Enteral Tube every 6 hours PRN Temp greater or equal to 38C (100.4F)    Allergies    Allergy Status Unknown    Intolerances    LABS:                        12.0   12.53 )-----------( 257      ( 07 Dec 2021 05:41 )             37.8     12-07    153<H>  |  119<H>  |  39<H>  ----------------------------<  147<H>  4.5   |  26  |  0.99    Ca    8.4      07 Dec 2021 05:41  Phos  3.1     12-07  Mg     2.5     12-07    TPro  5.2<L>  /  Alb  2.8<L>  /  TBili  0.7  /  DBili  x   /  AST  85<H>  /  ALT  141<H>  /  AlkPhos  101  12-07    RADIOLOGY & ADDITIONAL TESTS:  Reviewed    MICRO:   Culture - Blood (collected 06 Dec 2021 12:07)  Source: .Blood Blood  Preliminary Report (07 Dec 2021 13:01):    No growth at 1 day.

## 2021-12-07 NOTE — PROCEDURE NOTE - NSSITEPREP_SKIN_A_CORE
chlorhexidine/Adherence to aseptic technique: hand hygiene prior to donning barriers (gown, gloves), don cap and mask, sterile drape over patient
alcohol
chlorhexidine

## 2021-12-07 NOTE — PROCEDURE NOTE - NSPROCDETAILS_GEN_ALL_CORE
location identified, draped/prepped, sterile technique used, needle inserted/introduced/positive blood return obtained via catheter/connected to a pressurized flush line/sutured in place/Seldinger technique/all materials/supplies accounted for at end of procedure
guidewire recovered/lumen(s) aspirated and flushed/sterile dressing applied/sterile technique, catheter placed/ultrasound guidance with use of sterile gel and probe cove
nasogastric/audible air bolus/placement confirmed by auscultation/gastric secretions aspirated, placement confirmed
location identified, draped/prepped, sterile technique used, needle inserted/introduced/positive blood return obtained via catheter/connected to a pressurized flush line/sutured in place/hemostasis with direct pressure, dressing applied/Seldinger technique/all materials/supplies accounted for at end of procedure

## 2021-12-07 NOTE — PROCEDURE NOTE - NSPOSTCAREGUIDE_GEN_A_CORE
Verbal/written post procedure instructions were given to patient/caregiver/Instructed patient/caregiver regarding signs and symptoms of infection/Keep the cast/splint/dressing clean and dry/Care for catheter as per unit/ICU protocols
Verbal/written post procedure instructions were given to patient/caregiver/Instructed patient/caregiver to follow-up with primary care physician/Instructed patient/caregiver regarding signs and symptoms of infection/Keep the cast/splint/dressing clean and dry/Care for catheter as per unit/ICU protocols
Care for catheter as per unit/ICU protocols
Verbal/written post procedure instructions were given to patient/caregiver/Instructed patient/caregiver to follow-up with primary care physician/Instructed patient/caregiver regarding signs and symptoms of infection/Keep the cast/splint/dressing clean and dry

## 2021-12-07 NOTE — CHART NOTE - NSCHARTNOTEFT_GEN_A_CORE
Admitting Diagnosis:   Patient is a 87y old  Male who presents with a chief complaint of     PAST MEDICAL & SURGICAL HISTORY:  PMH: HTN, gout, hypothyroid, previous TIA on xarelto     Current Nutrition Order:  Diet, NPO with Tube Feed:   Tube Feeding Modality: Nasogastric  Nepro with Carb Steady (NEPRORTH)  Total Volume for 24 Hours (mL): 1056  Total Number of Cans: 5  Continuous  Starting Tube Feed Rate {mL per Hour}: 10  Increase Tube Feed Rate by (mL): 10     Every 4 hours  Until Goal Tube Feed Rate (mL per Hour): 44  Tube Feed Duration (in Hours): 24  Tube Feed Start Time: 07:00  Liquid Protein Supplement     Qty per Day:  1 (12-03-21 @ 09:46)    Enteral Nutrition: Initiated 12/3  Route: Dobhoff   Regimen: Nepro @ 44ml/hr x24hrs + 2 LPS   Water flush: 250ml q6hrs (per EMR)   Provides: 1901 kcals, 86g protein, 768ml free water. LPS provides 200kcals and 30g protein. TF + LPS total provides 2101 kcals, 116g protein, 768ml free water which meets 100% of nutrient needs. Noted TF pump infusing at 44ml/hr today. Unable to check pump hx d/t in COVID-19 precautions.   Tolerance: abd-soft, + bowel sounds. Noted smear this morning (12/7). No solid BM since admission (x6 days)     GI Issues: abd-soft, + bowel sounds. Noted smear this morning (12/7). No solid BM since admission (x6 days)   -No bowel meds ordered   Pain: Non-verbal indicators absent   Skin Integrity: bruising, blister  -No pressure breakdown  -Kiran score 13    Labs: Sodium 153 H, chloride 119 H, BUN 39 H, AST 85 H,  H, POCT glucose x48 hrs:   12-07    153<H>  |  119<H>  |  39<H>  ----------------------------<  147<H>  4.5   |  26  |  0.99    Ca    8.4      07 Dec 2021 05:41  Phos  3.1     12-07  Mg     2.5     12-07    TPro  5.2<L>  /  Alb  2.8<L>  /  TBili  0.7  /  DBili  x   /  AST  85<H>  /  ALT  141<H>  /  AlkPhos  101  12-07    POCT Blood Glucose.: 117 mg/dL (07 Dec 2021 06:49)  POCT Blood Glucose.: 164 mg/dL (06 Dec 2021 23:56)  POCT Blood Glucose.: 182 mg/dL (06 Dec 2021 17:19)  POCT Blood Glucose.: 103 mg/dL (06 Dec 2021 11:34)    Medications:  MEDICATIONS  (STANDING):  carvedilol 6.25 milliGRAM(s) Oral every 12 hours  chlorhexidine 0.12% Liquid 15 milliLiter(s) Oral Mucosa every 12 hours  chlorhexidine 2% Cloths 1 Application(s) Topical <User Schedule>  dexAMETHasone  Injectable 6 milliGRAM(s) IV Push every 24 hours  dexMEDEtomidine Infusion 0.2 MICROgram(s)/kG/Hr (4.23 mL/Hr) IV Continuous <Continuous>  dextrose 40% Gel 15 Gram(s) Oral once  dextrose 5%. 1000 milliLiter(s) (50 mL/Hr) IV Continuous <Continuous>  dextrose 5%. 1000 milliLiter(s) (100 mL/Hr) IV Continuous <Continuous>  dextrose 50% Injectable 25 Gram(s) IV Push once  dextrose 50% Injectable 12.5 Gram(s) IV Push once  dextrose 50% Injectable 25 Gram(s) IV Push once  enoxaparin Injectable 40 milliGRAM(s) SubCutaneous every 24 hours  glucagon  Injectable 1 milliGRAM(s) IntraMuscular once  insulin lispro (ADMELOG) corrective regimen sliding scale   SubCutaneous every 6 hours  levothyroxine Injectable 37.5 MICROGram(s) IV Push at bedtime  lisinopril 10 milliGRAM(s) Oral every 24 hours  pantoprazole   Suspension 40 milliGRAM(s) Enteral Tube every 24 hours  piperacillin/tazobactam IVPB.. 4.5 Gram(s) IV Intermittent every 6 hours  propofol Infusion 10 MICROgram(s)/kG/Min (5.08 mL/Hr) IV Continuous <Continuous>  vancomycin  IVPB 1250 milliGRAM(s) IV Intermittent every 24 hours    MEDICATIONS  (PRN):  acetaminophen    Suspension .. 650 milliGRAM(s) Enteral Tube every 6 hours PRN Temp greater or equal to 38C (100.4F)    Anthropometrics:  Ht: 188cm (74")  Wt: 84.6kg (12/1)- Admit     IBW: 190# (86.4kg)  % IBW: 98%    Weight Change: No new weight per EMR. Recommend daily weights.     Estimated energy needs: 84.6kg  Actual BW used for calculations as pt weighs <100% of IBW, per critical care nutrition guidelines. Needs estimated for age and adjusted for vent demands, COVID infection. Fluids per team 2/2 compromised respiratory status  Calories: 2100-2550kcals (25-30kcals/kg)  Protein: 110-135g (1.3-1.6g/kg)  Fluid: Per MD    Subjective:   88 y/o male with HTN, gout, hypothyroid, and previous TIA on xarelto, and with a recent diagnosis of Covid-19 2 days ago who is presenting with acute altered mental status change and HD instability, found to have b/l pulmonary consolidations on CT scan, now intubated for respiratory distress, started on levophed and antibiotics, patient now off pressors and more stable, pending improvement in MS for possible extubation. Per MD, now off all sedation but remains non-responsive and does not withdraw from pain or follow commands. Palliative follow for goals of care.   Remains on TF via dobhoff, infusing at goal rate. Spoke with MD via phone this morning and recommend change TF to allow 1 hour off feeds before and after synthroid. Will update TF order and MD plans to sign off. Continue to monitor for goals of care.     Previous Nutrition Diagnosis: Inadequate energy intake R/T current NPO status AEB 0% of EER being met at this time     Active [   ]  Resolved [ x  ]    If resolved, new PES: Inadequate oral intake R/T intubated % nutrient needs via dobhoff (EN)     Goal: Meet >75% of nutrient needs via appropriate route     Recommendations:  1. Change TF regimen to allow time of TF for 1 hour before and after synthroid d/t food-drug interaction  -Nepro @ 50ml/hr x22hrs + 2 LPS   (EN + LPS provides 2180 kcals, 119g protein, 780ml free water)   -Water flush per MD  2. Upon/ if able to extubate, consult SLP for bedside eval prior to diet advancement   3. Bowel regimen per MD (no BM x6 days), needs bowel regimen   4. Monitor labs daily, maintain glucose <180mg/dL     Education: Critically ill    Risk Level: High [ x  ] Moderate [   ] Low [   ]  Will continue to monitor per protocol.  Lisbeth Gonzalez RD,CDN,,CNSC

## 2021-12-08 NOTE — CHART NOTE - NSCHARTNOTEFT_GEN_A_CORE
Reached out to patient's wife for medical updates and emotional support. Discussed recent developments: patient appears to be waking up more which could lead to medical extubation soon but also explained that there is evidence of gangrene of fingers/toes. Explained that this could necessitate amputation if full/aggressive interventions were pursued; she has no answer as she was surprised and needs time to process the information. Will continue to assist with GOC discussion as clinical course unfolds. She informed me that the best contact number between 12/9/21 @2pm through 12/13 will [phone].    Chirag Finley, Palliative Care Attending  Questions/Concerns: Call [phone] (including Nights/Weekend) or message on Microsoft Teams (Chirag Finley)      3:45-4:20 Reached out to patient's wife for medical updates and emotional support. Discussed recent developments: patient appears to be waking up more which could lead to medical extubation soon but also explained that there is evidence of gangrene of fingers/toes. Explained that this could necessitate amputation if full/aggressive interventions were pursued; she has no answer as she was surprised and needs time to process the information. Will continue to assist with GOC discussion as clinical course unfolds. She informed me that her best contact number between 12/9/21 after 2pm through 12/13 will [phone].    Chirag Finley, Palliative Care Attending  Questions/Concerns: Call [phone] (including Nights/Weekend) or message on Microsoft Teams (Chirag Finley)      3:45-4:20 Reached out to patient's wife for medical updates and emotional support. Discussed recent developments: patient appears to be waking up more which could lead to medical extubation soon but also explained that there is evidence of gangrene of fingers/toes. Explained that this could necessitate amputation if full/aggressive interventions were pursued; she has no answer as she was surprised and needs time to process the information. Will continue to assist with GOC discussion as clinical course unfolds. She informed me that her best contact number between 12/9/21 after 2pm through 12/13 will [phone].    Chirag Finley, Palliative Care Attending  Questions/Concerns: Call [phone] (including Nights/Weekend) or message on Microsoft Teams (Chirag Finley)        PALLIATIVE MEDICINE COORDINATION OF CARE DOCUMENTATION: [x] Inpatient Consult  Non-Face-to-Face prolonged service provided that relates to (face-to-face) care that has or will occur and ongoing patient management, including one or more of the following: -Documentation review from other physicians and health care professional services -Review of medical records and diagnostic/radiology study results -Coordination with patient's support system  ************************************************************************  MEDICATION REVIEW:  MEDICATIONS  (STANDING):  dexAMETHasone  Injectable 6 milliGRAM(s) IV Push every 24 hours  dexMEDEtomidine Infusion 0.2 MICROgram(s)/kG/Hr (4.23 mL/Hr) IV Continuous <Continuous>  heparin  Infusion 1500 Unit(s)/Hr (15 mL/Hr) IV Continuous <Continuous>  insulin lispro (ADMELOG) corrective regimen sliding scale   SubCutaneous every 6 hours  levothyroxine Injectable 37.5 MICROGram(s) IV Push at bedtime  lisinopril 10 milliGRAM(s) Oral every 24 hours  meropenem  IVPB 1000 milliGRAM(s) IV Intermittent every 8 hours  pantoprazole   Suspension 40 milliGRAM(s) Enteral Tube every 24 hours  propofol Infusion 10 MICROgram(s)/kG/Min (5.08 mL/Hr) IV Continuous <Continuous>  senna 2 Tablet(s) Oral at bedtime    MEDICATIONS  (PRN):  acetaminophen    Suspension .. 650 milliGRAM(s) Enteral Tube every 6 hours PRN Temp greater or equal to 38C (100.4F)  fentaNYL    Injectable 50 MICROGram(s) IV Push every 2 hours PRN For RR>30    ISTOP REFERENCE: [id number]  - no active Rx's  - PRN usage: Fentanyl x1, Tylenol x3  ------------------------------------------------------------------------  COORDINATION OF CARE:  - Palliative Care consulted for: [facility]  - Patient (to be) assessed: 12/3/21  - Patient previously seen by Palliative Care service: NO    ADVANCE CARE PLANNING  - Code status: Full Code  - MOLST reviewed in chart: NONE; None found on Alpha  - HCP/Surrogate: eduardo Schreiber, [phone]  - [facility] documents: NONE found on [place]  - HCP/Living will/Other Advanced Directives in Alpha: NONE found on Alpha  ------------------------------------------------------------------------  CARE PROVIDER DOCUMENTATION:  - SW/ANAT notes: Remains medically active  - ICU notes: STD on monitor, elevated troponin, downtrending this morning. vascular surgery to assess limb necrosis.  - Vascular Surgery notes: Would eventually need amputations of extremities given duration of ischemia and appearance. Patient currently unstable to perform such aggressive measures. Follow-up [facility] with family regarding desire for amputation    PLAN OF CARE  - Known admissions in past year: 0  - Current admit date: 12/1/21  - LOS: 7  - LACE score: 8  - Current dispo plan: TO BE DETERMINED  ------------------------------------------------------------------------  - Time Spent/Chart reviewed: 35 Minutes [including time used to gather, review and transfer data]  - Start: 3:45PM  - End: 4:20PM    Prolonged services rendered, as part of this patient's care provided by Palliative Medicine, include: i. chart review for provider and ancillary service documentation, ii. pertinent diagnostics including laboratory and imaging studies, iii. medication review including PRN use, iv. admission history including previous palliative care encounters and GOC notes, v. advance care planning documents including HCP and MOLST forms in Alpha. Part of Palliative Medicine extended evaluation and management also involves coordination of care with our IDT, the primary and consulting teams, and unit CM/SW and Hospice if eligible. Recommendations based on the information gathered and discussed are outlined in the A/P of Palliative notes. Reached out to patient's wife for medical updates and emotional support. Discussed recent developments: patient appears to be waking up more which could lead to medical extubation soon but also explained that there is evidence of gangrene of fingers/toes. Explained that this could necessitate amputation if full/aggressive interventions were pursued; she has no answer as she was surprised and needs time to process the information. Will continue to assist with GOC discussion as clinical course unfolds.     Chirag Finley, Palliative Care Attending  Questions/Concerns: Call [phone] (including Nights/Weekend) or message on Microsoft Teams (Chirag Finley)        PALLIATIVE MEDICINE COORDINATION OF CARE DOCUMENTATION: [x] Inpatient Consult  Non-Face-to-Face prolonged service provided that relates to (face-to-face) care that has or will occur and ongoing patient management, including one or more of the following: -Documentation review from other physicians and health care professional services -Review of medical records and diagnostic/radiology study results -Coordination with patient's support system  ************************************************************************  MEDICATION REVIEW:  MEDICATIONS  (STANDING):  dexAMETHasone  Injectable 6 milliGRAM(s) IV Push every 24 hours  dexMEDEtomidine Infusion 0.2 MICROgram(s)/kG/Hr (4.23 mL/Hr) IV Continuous <Continuous>  heparin  Infusion 1500 Unit(s)/Hr (15 mL/Hr) IV Continuous <Continuous>  insulin lispro (ADMELOG) corrective regimen sliding scale   SubCutaneous every 6 hours  levothyroxine Injectable 37.5 MICROGram(s) IV Push at bedtime  lisinopril 10 milliGRAM(s) Oral every 24 hours  meropenem  IVPB 1000 milliGRAM(s) IV Intermittent every 8 hours  pantoprazole   Suspension 40 milliGRAM(s) Enteral Tube every 24 hours  propofol Infusion 10 MICROgram(s)/kG/Min (5.08 mL/Hr) IV Continuous <Continuous>  senna 2 Tablet(s) Oral at bedtime    MEDICATIONS  (PRN):  acetaminophen    Suspension .. 650 milliGRAM(s) Enteral Tube every 6 hours PRN Temp greater or equal to 38C (100.4F)  fentaNYL    Injectable 50 MICROGram(s) IV Push every 2 hours PRN For RR>30    ISTOP REFERENCE: [id number]  - no active Rx's  - PRN usage: Fentanyl x1, Tylenol x3  ------------------------------------------------------------------------  COORDINATION OF CARE:  - Palliative Care consulted for: [facility]  - Patient (to be) assessed: 12/3/21  - Patient previously seen by Palliative Care service: NO    ADVANCE CARE PLANNING  - Code status: Full Code  - MOLST reviewed in chart: NONE; None found on [facility]  - HCP/Surrogate: eduardo Schreiber, [phone]  - [facility] documents: NONE found on [place]  - HCP/Living will/Other Advanced Directives in Alpha: NONE found on Alpha  ------------------------------------------------------------------------  CARE PROVIDER DOCUMENTATION:  - SERENA/ANAT notes: Remains medically active  - ICU notes: STD on monitor, elevated troponin, downtrending this morning. vascular surgery to assess limb necrosis.  - Vascular Surgery notes: Would eventually need amputations of extremities given duration of ischemia and appearance. Patient currently unstable to perform such aggressive measures. Follow-up [facility] with family regarding desire for amputation    PLAN OF CARE  - Known admissions in past year: 0  - Current admit date: 12/1/21  - LOS: 7  - LACE score: 8  - Current dispo plan: TO BE DETERMINED  ------------------------------------------------------------------------  - Time Spent/Chart reviewed: 35 Minutes [including time used to gather, review and transfer data]  - Start: 3:45PM  - End: 4:20PM    Prolonged services rendered, as part of this patient's care provided by Palliative Medicine, include: i. chart review for provider and ancillary service documentation, ii. pertinent diagnostics including laboratory and imaging studies, iii. medication review including PRN use, iv. admission history including previous palliative care encounters and GOC notes, v. advance care planning documents including HCP and MOLST forms in [facility]. Part of Palliative Medicine extended evaluation and management also involves coordination of care with our IDT, the primary and consulting teams, and unit CM/SW and Hospice if eligible. Recommendations based on the information gathered and discussed are outlined in the A/P of Palliative notes.

## 2021-12-08 NOTE — CHART NOTE - NSCHARTNOTEFT_GEN_A_CORE
Infectious Diseases Anti-infective Approval Note    Medication: meropenem  Dose: 1g  Route: IV  Frequency: q8h  Duration**: 3 days     *THIS IS NOT AN INFECTIOUS DISEASES CONSULTATION*    **Indicates duration of approval, not necessarily duration of treatment**

## 2021-12-08 NOTE — CONSULT NOTE ADULT - ASSESSMENT
86 yo M w/ septic shock and AHRF 2/2 COVID complicated by superimposed pneumonia requiring 5d pressors. Vascular surgery consulted for dry gangrene of the extremities. Physical exam with dry gangrene of the b/l upper and lower extremities without signs of infection.     No acute vascular surgical intervention at this time  Daily wound care with betadine to gangrenous fingers and toes, keep gangrenous areas dry  Care per MICU   Follow-up GOC with family regarding desire for amputation. Based on chart review, patient unlikely to want aggressive measures. Palliative following.   Team 3c, Vascular surgery will continue to follow       Plan discussed with attending and chief resident.   ____________________________________________________  ALLEY Castillo - Resident   Surgery  88 yo M w/ septic shock and AHRF 2/2 COVID complicated by superimposed pneumonia requiring 5d pressors. Vascular surgery consulted for dry gangrene of the extremities. Physical exam with dry gangrene of the b/l upper and lower extremities without signs of infection.     No acute vascular surgical intervention at this time  Daily wound care with betadine to gangrenous fingers and toes, keep gangrenous areas dry  Would eventually need amputations of extremities given duration of ischemia and appearance. Patient currently unstable to perform such aggressive measures  Follow-up GOC with family regarding desire for amputation. Based on chart review, patient unlikely to want aggressive measures. Palliative following.   Please call back if patient condition improves and family is amenable to aggressive measures.      Plan discussed with attending and chief resident.   ____________________________________________________  ALLEY Castillo - Resident   Surgery  88 yo M w/ septic shock and AHRF 2/2 COVID complicated by superimposed pneumonia requiring 5d pressors. Vascular surgery consulted for dry gangrene of the extremities. Physical exam with dry gangrene of the b/l upper and lower extremities without signs of infection.     No acute vascular surgical intervention at this time  Recommend hand surgery consult for upper extremity ischemia  Daily wound care with betadine to gangrenous fingers and toes, keep gangrenous areas dry  Would eventually need amputations of extremities given duration of ischemia and appearance. Patient currently unstable to perform such aggressive measures  Follow-up GOC with family regarding desire for amputation. Based on chart review, patient unlikely to want aggressive measures. Palliative following.   Please call back if patient condition improves and family is amenable to aggressive measures.      Plan discussed with attending and chief resident.   ____________________________________________________  ALLEY Castillo - Resident   Surgery  88 yo M w/ septic shock and AHRF 2/2 COVID complicated by superimposed pneumonia requiring 5d pressors. Vascular surgery consulted for dry gangrene of the extremities. Physical exam with dry gangrene of the b/l upper and lower extremities without signs of infection.     No acute vascular surgical intervention at this time  Recommend hand surgery consult for upper extremity ischemia  Heparin drip for ischemic limbs  Daily wound care with betadine to gangrenous fingers and toes, keep gangrenous areas dry  Would eventually need amputations of extremities given duration of ischemia and appearance. Patient currently unstable to perform such aggressive measures  Follow-up GOC with family regarding desire for amputation. Based on chart review, patient unlikely to want aggressive measures. Palliative following.   Please call back if patient condition improves and family is amenable to aggressive measures.      Plan discussed with attending and chief resident.   ____________________________________________________  ALLEY Castillo - Resident   Surgery

## 2021-12-08 NOTE — CONSULT NOTE ADULT - SUBJECTIVE AND OBJECTIVE BOX
HPI:  88 y/o male with unknown pmh but known to be on "blood thinners", recent diagnosis of Covid-19 2 days ago, who presented after being found altered, naked, and confused in his apartment, admitted for AHRF with sats in the 70s, patient was hypotensive, placed on Levo and intubated and transferred to MICU. Per assistant who is at bedside, she was called by the building's super and Pt's wife as they were unable to get in touch with Pt. She arrived this morning at his apartment to find him naked on the floor with a broken vase next to him and overturned furniture. Pt was agitated and confused. EMS was called and brought Pt to the ED. Assistant thinks Pt is on a blood thinner but does not know which kind. According to outpatient pharmacy patient takes xarelto 20, allopurinol 300mg, statin 10 mg, synthroid 50 mcg, lisinopril 10mg. She is unsure of his medical history but reports that he tested positive for Covid-19 2 days ago. She think he may be vaccinated for Covid-19 but is unsure. In ED, Pt is agitated, pulling on his arms, and fighting EMS. He is extremely confused and is not oriented to person; cannot provide any history, O2 sat was in 70's upon their arrival and he was hypertensive to 170's/80's. ICU was consulted and patient was intubated and transferred to MICU for further monitoring.     ED Vitals: 95.3, initialled SBPs in 180s--> 60s-80s, HR 140s--> 60s, 26 RR and 83% on nonrebreather  ED Labs: WBC 14.6, Hgb 13, Na and K wnl, Bicarb 18, AG 23, lactate 10.4--->1.7, Cr 1.1, mild transaminitis, 0.5 Troponin T, AB.31/39/113/20. CTH neg, CT chest with b/l pulmonary consolidations, CT A/P neg, CT PE neg   ED Interventions: Initially given 10 mg IV versed. Patient intubated and sedated, started on levophed and antibiotics, A line and central line placed and MICU consulted (01 Dec 2021 15:49)    Vascular Surgery Addendum:   Vascular surgery called for evaluation of dry gangrene in the context of pressors. Per chart review, hospital course significant for COVID with superimposed PNA, spiking fevers despite Vanc/Zosyn with continued hypoxia and tachypnea while intubated, escalated to meropenem. Per attending attestation today "Recently found living will that stated if he was not to return to good functional status he would not want aggressive treatment, will discuss new living will finding with wife. If unable to be extubated would not pursue trach/PEG." On levophed starting 21 highest 0.2 mcg/kg/hr, off pressors since 21. 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       REVIEW OF SYSTEMS  Unable to obtain 2/2 sedation status.     MEDICATIONS  (STANDING):  chlorhexidine 0.12% Liquid 15 milliLiter(s) Oral Mucosa every 12 hours  chlorhexidine 2% Cloths 1 Application(s) Topical <User Schedule>  dexAMETHasone  Injectable 6 milliGRAM(s) IV Push every 24 hours  dexMEDEtomidine Infusion 0.2 MICROgram(s)/kG/Hr (4.23 mL/Hr) IV Continuous <Continuous>  dextrose 40% Gel 15 Gram(s) Oral once  dextrose 5%. 1000 milliLiter(s) (50 mL/Hr) IV Continuous <Continuous>  dextrose 5%. 1000 milliLiter(s) (150 mL/Hr) IV Continuous <Continuous>  dextrose 5%. 1000 milliLiter(s) (100 mL/Hr) IV Continuous <Continuous>  dextrose 50% Injectable 25 Gram(s) IV Push once  dextrose 50% Injectable 12.5 Gram(s) IV Push once  dextrose 50% Injectable 25 Gram(s) IV Push once  enoxaparin Injectable 40 milliGRAM(s) SubCutaneous every 24 hours  glucagon  Injectable 1 milliGRAM(s) IntraMuscular once  insulin lispro (ADMELOG) corrective regimen sliding scale   SubCutaneous every 6 hours  levothyroxine Injectable 37.5 MICROGram(s) IV Push at bedtime  lisinopril 10 milliGRAM(s) Oral every 24 hours  meropenem  IVPB 1000 milliGRAM(s) IV Intermittent every 8 hours  pantoprazole   Suspension 40 milliGRAM(s) Enteral Tube every 24 hours  propofol Infusion 10 MICROgram(s)/kG/Min (5.08 mL/Hr) IV Continuous <Continuous>  senna 2 Tablet(s) Oral at bedtime    MEDICATIONS  (PRN):  acetaminophen    Suspension .. 650 milliGRAM(s) Enteral Tube every 6 hours PRN Temp greater or equal to 38C (100.4F)  fentaNYL    Injectable 50 MICROGram(s) IV Push every 2 hours PRN For RR>30      Allergies    Allergy Status Unknown    Intolerances          Vital Signs Last 24 Hrs  T(C): 38.3 (08 Dec 2021 09:20), Max: 38.7 (08 Dec 2021 08:00)  T(F): 100.9 (08 Dec 2021 09:20), Max: 101.7 (08 Dec 2021 08:00)  HR: 76 (08 Dec 2021 13:00) (54 - 84)  BP: 146/66 (08 Dec 2021 13:00) (93/51 - 178/76)  BP(mean): 95 (08 Dec 2021 13:00) (67 - 109)  RR: 25 (08 Dec 2021 13:00) (16 - 54)  SpO2: 99% (08 Dec 2021 13:00) (87% - 100%)    PHYSICAL EXAM:   Gen: NAD, resting comfortably in bed, sedated  CV: NSR on cardiac monitor   Pulm: no respiratory distress, intubated on AC   Abd: soft, ND, unable to assess TTP, rebound or guarding   Ext: RUE: edema, mottling fingers to mid forearm, +biphasic ulnar, no radial signal, +monophasic palmar arch multiple forearm ulcers without purulence or drainage, dry gangrene of all digits to mid finger, LUE: no edema, palpable radial, palpable ulnar, dry gangrene of the distal fingers (most significant in the thumb, 3rd and 4th digits), RLE: moderate edema, palp fem, palp pop, biphasic DP, biphasic PT, dry gangrene of the toes, LLE: moderate edema, palp fem, palp pop, DP biphasic, palpable PT, dry gangrene of the toes, bullae of the lateral ankle     LABS:                        11.8   13.17 )-----------( 215      ( 08 Dec 2021 06:22 )             37.5     12-08    154<H>  |  118<H>  |  42<H>  ----------------------------<  116<H>  3.9   |  28  |  1.12    Ca    8.8      08 Dec 2021 06:22  Phos  3.1     12-07  Mg     2.3     12-08    TPro  5.3<L>  /  Alb  2.6<L>  /  TBili  0.6  /  DBili  x   /  AST  94<H>  /  ALT  120<H>  /  AlkPhos  96  12-08    LIVER FUNCTIONS - ( 08 Dec 2021 06:22 )  Alb: 2.6 g/dL / Pro: 5.3 g/dL / ALK PHOS: 96 U/L / ALT: 120 U/L / AST: 94 U/L / GGT: x             CARDIAC MARKERS ( 07 Dec 2021 19:19 )  x     / 1.25 ng/mL / 1018 U/L / x     / 5.9 ng/mL  CARDIAC MARKERS ( 07 Dec 2021 05:41 )  x     / 1.36 ng/mL / 1227 U/L / x     / 7.0 ng/mL      CAPILLARY BLOOD GLUCOSE      POCT Blood Glucose.: 96 mg/dL (08 Dec 2021 11:15)  POCT Blood Glucose.: 119 mg/dL (08 Dec 2021 07:41)  POCT Blood Glucose.: 132 mg/dL (07 Dec 2021 23:20)  POCT Blood Glucose.: 198 mg/dL (07 Dec 2021 17:48)    Urinalysis Basic - ( 08 Dec 2021 11:37 )    Color: x / Appearance: x / SG: x / pH: x  Gluc: x / Ketone: x  / Bili: x / Urobili: x   Blood: x / Protein: x / Nitrite: x   Leuk Esterase: x / RBC: 5-10 /HPF / WBC < 5 /HPF   Sq Epi: x / Non Sq Epi: 0-5 /HPF / Bacteria: Present /HPF        Urinalysis with Rflx Culture (collected 08 Dec 2021 10:23)    Culture - Sputum (collected 07 Dec 2021 13:34)  Source: .Sputum Sputum  Gram Stain (07 Dec 2021 17:32):    Few-moderate epithelial cells    Few-moderate White blood cells    No organisms seen  Final Report (07 Dec 2021 17:32):    Sputum specimen rejected.  Microscopic examination indicates    oropharyngeal contamination.  Please repeat.    Culture - Blood (collected 06 Dec 2021 12:07)  Source: .Blood Blood  Preliminary Report (08 Dec 2021 13:00):    No growth at 2 days.    Culture - Urine (collected 01 Dec 2021 14:40)  Source: Catheterized Catheterized  Final Report (02 Dec 2021 09:10):    No growth    Culture - Blood (collected 01 Dec 2021 14:05)  Source: .Blood Blood-Peripheral  Final Report (06 Dec 2021 15:00):    No growth at 5 days.    Culture - Blood (collected 01 Dec 2021 14:05)  Source: .Blood Blood-Peripheral  Final Report (06 Dec 2021 15:00):    No growth at 5 days.         RADIOLOGY & ADDITIONAL STUDIES:  < from: Xray Chest 1 View- PORTABLE-Urgent (Xray Chest 1 View- PORTABLE-Urgent .) (21 @ 15:52) >    EXAM:  XR CHEST PORTABLE URGENT 1V                          PROCEDURE DATE:  2021          INTERPRETATION:  Clinical History: Covid    Frontal examination of the chest demonstrates Dobbhoff feeding tube overlying course of esophagus and left upper abdomen. Endotracheal tube in satisfactory position. No interval change position remaining support devices in comparison to prior examination of the chest 12/3/2021. Progression lung infiltrates.    IMPRESSION: Progression lung infiltrates. Dobbhoff feeding tube in satisfactory position    --- End of Report ---          Thank you for the opportunity to participate in the care of this patient.      MONICA BADILLO MD; Attending Radiologist  This document has been electronically signed. Dec  5 2021  9:42AM    < end of copied text >

## 2021-12-08 NOTE — PROGRESS NOTE ADULT - SUBJECTIVE AND OBJECTIVE BOX
OVERNIGHT EVENTS:  - Noted to have STD on monitor, elevated troponins, downtrending this morning   -     SUBJECTIVE:     VITAL SIGNS:  T(F): 99.9 (12-08-21 @ 14:28)  HR: 76 (12-08-21 @ 14:00)  BP: 159/74 (12-08-21 @ 14:00)  RR: 25 (12-08-21 @ 14:00)  SpO2: 91% (12-08-21 @ 14:00)  Wt(kg): --      12-07-21 @ 07:01  -  12-08-21 @ 07:00  --------------------------------------------------------  IN: 3665.7 mL / OUT: 2425 mL / NET: 1240.7 mL    12-08-21 @ 07:01  -  12-08-21 @ 14:38  --------------------------------------------------------  IN: 1188.1 mL / OUT: 710 mL / NET: 478.1 mL        Drips:    Lines/tubes:      PHYSICAL EXAM:    Constitutional: Laying in bed, no acute distress, appears stated age, well nurished   Neurological: AAOx3, answers all questions appropriately, CN Grossly intact, no FNDs, can move all 4 extremities  HEENT: PERRL, EOMI, sclera non-icteric, neck supple, no masses, no JVD, MMM, good dentition  Respiratory: No labored breathing or respiratory distress, CTA b/l, good air entry b/l, no wheezing,  no crackles/rales, without accessory muscle use and no intercostal retractions  Cardiovascular: RRR, normal S1S2, no murmurs or rubs   Gastrointestinal: soft, non tender, non distended, neg hepatojugular reflex, no masses or overt organomegaly palpable, BS normal  Extremities: Warm, well perfused, pulses equal bilateral upper and lower extremities, no edema, no clubbing, no evidence of cyanosis   Skin: Normal temperature, warm, dry. No rashes or lesions     MEDICATIONS  (STANDING):  chlorhexidine 0.12% Liquid 15 milliLiter(s) Oral Mucosa every 12 hours  chlorhexidine 2% Cloths 1 Application(s) Topical <User Schedule>  dexAMETHasone  Injectable 6 milliGRAM(s) IV Push every 24 hours  dexMEDEtomidine Infusion 0.2 MICROgram(s)/kG/Hr (4.23 mL/Hr) IV Continuous <Continuous>  dextrose 40% Gel 15 Gram(s) Oral once  dextrose 5%. 1000 milliLiter(s) (50 mL/Hr) IV Continuous <Continuous>  dextrose 5%. 1000 milliLiter(s) (150 mL/Hr) IV Continuous <Continuous>  dextrose 5%. 1000 milliLiter(s) (100 mL/Hr) IV Continuous <Continuous>  dextrose 50% Injectable 25 Gram(s) IV Push once  dextrose 50% Injectable 12.5 Gram(s) IV Push once  dextrose 50% Injectable 25 Gram(s) IV Push once  enoxaparin Injectable 40 milliGRAM(s) SubCutaneous every 24 hours  glucagon  Injectable 1 milliGRAM(s) IntraMuscular once  insulin lispro (ADMELOG) corrective regimen sliding scale   SubCutaneous every 6 hours  levothyroxine Injectable 37.5 MICROGram(s) IV Push at bedtime  lisinopril 10 milliGRAM(s) Oral every 24 hours  meropenem  IVPB 1000 milliGRAM(s) IV Intermittent every 8 hours  pantoprazole   Suspension 40 milliGRAM(s) Enteral Tube every 24 hours  propofol Infusion 10 MICROgram(s)/kG/Min (5.08 mL/Hr) IV Continuous <Continuous>  senna 2 Tablet(s) Oral at bedtime    MEDICATIONS  (PRN):  acetaminophen    Suspension .. 650 milliGRAM(s) Enteral Tube every 6 hours PRN Temp greater or equal to 38C (100.4F)  fentaNYL    Injectable 50 MICROGram(s) IV Push every 2 hours PRN For RR>30      Allergies    Allergy Status Unknown    Intolerances        LABS:                        11.8   13.17 )-----------( 215      ( 08 Dec 2021 06:22 )             37.5     12-08    154<H>  |  118<H>  |  42<H>  ----------------------------<  116<H>  3.9   |  28  |  1.12    Ca    8.8      08 Dec 2021 06:22  Phos  3.1     12-07  Mg     2.3     12-08    TPro  5.3<L>  /  Alb  2.6<L>  /  TBili  0.6  /  DBili  x   /  AST  94<H>  /  ALT  120<H>  /  AlkPhos  96  12-08      Urinalysis Basic - ( 08 Dec 2021 11:37 )    Color: x / Appearance: x / SG: x / pH: x  Gluc: x / Ketone: x  / Bili: x / Urobili: x   Blood: x / Protein: x / Nitrite: x   Leuk Esterase: x / RBC: 5-10 /HPF / WBC < 5 /HPF   Sq Epi: x / Non Sq Epi: 0-5 /HPF / Bacteria: Present /HPF          RADIOLOGY & ADDITIONAL TESTS:  Reviewed    MICRO:     Urinalysis with Rflx Culture (collected 12-08-21 @ 10:23)     OVERNIGHT EVENTS:  - Noted to have STD on monitor, elevated troponins, downtrending this morning   - started to become more arousable as we weaned down on sedation    SUBJECTIVE:   - Intubated and sedated    VITAL SIGNS:  T(F): 99.9 (12-08-21 @ 14:28)  HR: 76 (12-08-21 @ 14:00)  BP: 159/74 (12-08-21 @ 14:00)  RR: 25 (12-08-21 @ 14:00)  SpO2: 91% (12-08-21 @ 14:00)    12-07-21 @ 07:01  -  12-08-21 @ 07:00  --------------------------------------------------------  IN: 3665.7 mL / OUT: 2425 mL / NET: 1240.7 mL    12-08-21 @ 07:01  -  12-08-21 @ 14:38  --------------------------------------------------------  IN: 1188.1 mL / OUT: 710 mL / NET: 478.1 mL    Drips:  Propofol 20    Lines/tubes:  Left TLC  Mitchell    PHYSICAL EXAM:    Constitutional: Laying in bed, intubated  Neurological: On minimal sedation, more alert today, opening his eyes, moving all 4 extremities, squeezing hand on command  HEENT:  neck supple, no masses, no JVD  Respiratory: In sync w/ vent, minor crackles bl, otherwise clear   Cardiovascular: RRR, normal S1S2, no murmurs or rubs   Gastrointestinal: +BS, not distended, not rigid  Extremities: Extremities are more warm distally in all 4 extremities, weak distal pulses  Skin: Patients feet, toes and fingers now appear mottled and dark     MEDICATIONS  (STANDING):  chlorhexidine 0.12% Liquid 15 milliLiter(s) Oral Mucosa every 12 hours  chlorhexidine 2% Cloths 1 Application(s) Topical <User Schedule>  dexAMETHasone  Injectable 6 milliGRAM(s) IV Push every 24 hours  dexMEDEtomidine Infusion 0.2 MICROgram(s)/kG/Hr (4.23 mL/Hr) IV Continuous <Continuous>  dextrose 40% Gel 15 Gram(s) Oral once  dextrose 5%. 1000 milliLiter(s) (50 mL/Hr) IV Continuous <Continuous>  dextrose 5%. 1000 milliLiter(s) (150 mL/Hr) IV Continuous <Continuous>  dextrose 5%. 1000 milliLiter(s) (100 mL/Hr) IV Continuous <Continuous>  dextrose 50% Injectable 25 Gram(s) IV Push once  dextrose 50% Injectable 12.5 Gram(s) IV Push once  dextrose 50% Injectable 25 Gram(s) IV Push once  enoxaparin Injectable 40 milliGRAM(s) SubCutaneous every 24 hours  glucagon  Injectable 1 milliGRAM(s) IntraMuscular once  insulin lispro (ADMELOG) corrective regimen sliding scale   SubCutaneous every 6 hours  levothyroxine Injectable 37.5 MICROGram(s) IV Push at bedtime  lisinopril 10 milliGRAM(s) Oral every 24 hours  meropenem  IVPB 1000 milliGRAM(s) IV Intermittent every 8 hours  pantoprazole   Suspension 40 milliGRAM(s) Enteral Tube every 24 hours  propofol Infusion 10 MICROgram(s)/kG/Min (5.08 mL/Hr) IV Continuous <Continuous>  senna 2 Tablet(s) Oral at bedtime    MEDICATIONS  (PRN):  acetaminophen    Suspension .. 650 milliGRAM(s) Enteral Tube every 6 hours PRN Temp greater or equal to 38C (100.4F)  fentaNYL    Injectable 50 MICROGram(s) IV Push every 2 hours PRN For RR>30    Allergies    Allergy Status Unknown    Intolerances    LABS:                        11.8   13.17 )-----------( 215      ( 08 Dec 2021 06:22 )             37.5     12-08    154<H>  |  118<H>  |  42<H>  ----------------------------<  116<H>  3.9   |  28  |  1.12    Ca    8.8      08 Dec 2021 06:22  Phos  3.1     12-07  Mg     2.3     12-08    TPro  5.3<L>  /  Alb  2.6<L>  /  TBili  0.6  /  DBili  x   /  AST  94<H>  /  ALT  120<H>  /  AlkPhos  96  12-08    Urinalysis Basic - ( 08 Dec 2021 11:37 )    Color: x / Appearance: x / SG: x / pH: x  Gluc: x / Ketone: x  / Bili: x / Urobili: x   Blood: x / Protein: x / Nitrite: x   Leuk Esterase: x / RBC: 5-10 /HPF / WBC < 5 /HPF   Sq Epi: x / Non Sq Epi: 0-5 /HPF / Bacteria: Present /HPF      RADIOLOGY & ADDITIONAL TESTS:  Reviewed    MICRO:     Urinalysis with Rflx Culture (collected 08 Dec 2021 10:23)    Culture - Sputum (collected 07 Dec 2021 13:34)  Source: .Sputum Sputum  Gram Stain (07 Dec 2021 17:32):    Few-moderate epithelial cells    Few-moderate White blood cells    No organisms seen  Final Report (07 Dec 2021 17:32):    Sputum specimen rejected.  Microscopic examination indicates    oropharyngeal contamination.  Please repeat.    Culture - Blood (collected 06 Dec 2021 12:07)  Source: .Blood Blood  Preliminary Report (08 Dec 2021 13:00):    No growth at 2 days.

## 2021-12-08 NOTE — CONSULT NOTE ADULT - CONSULT REASON
Complex Medical Decision Making/GOC in the setting of Critical Illness / ICU Trigger
Acute Hypoxic Respiratory Failure
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

## 2021-12-08 NOTE — PROGRESS NOTE ADULT - ASSESSMENT
86 y/o male with HTN, gout, hypothyroid, and previous TIA on xarelto, and with a recent diagnosis of Covid-19 on 11/29 who presented with acute altered mental status change, HD instability, and hypoxia 2/2 covid pneumonia with superimposed bacterial pneumonia, found to have b/l pulmonary consolidations on CT scan, s/p intubation, started on multiple pressors and antibiotics, patient now off pressors and more stable, pending improvement in MS for possible extubation.     NEURO  #Patient AOx0 in the ED, agitated acute mental status change per coworkers  Patient initially hypertensive and tachycardic but later became profoundly hypotensive   Presumed to be due to sepsis in the setting of covid pneumonia and possible super-imposed bacterial infection  was on several pressors as well as sedatives for several days, now off all pressors and on minimal sedation with propofol   - Patient still intubated, patient on minimal sedation  - Today opening eyes, triggering the vent on CPAP trial   - CTH if continues to not improve his MS     #Prior TIA  Patient reportedly taking xarelto for this, discussed with his wife  - Will hold in case he may need procedure  - Also should consider substituting for ASA later      CARDS  #Shock 2/2 sepsis- Resolving   Meeting 4/4 SIRS criteria in the ED  Hypotensive with MAPS in 50s  Now off all pressors   - Restarting home lisinopril now that MARISABEL resolved   - Will continue zosyn for 10 day course (7/10)    #HTN  Patients pressures increasing off pressors  Unable to take home ACE given MARISABEL  Given IV Labetelol agaib overnight  - restarted lisinopril  - dc coreg     #HLD  - holding home statin in setting of transaminitis     PULM  #AHRF in the setting of COVID PNA  CT scan with b/l consolidations  Hypoxic into the 70s, altered, meeting 4/4 sirs criteria  Intubated. Patient tested + for covid on 11/29 and apparently is vaccinated   LFTs elevated. Good UOP, net even. Bx cultures neg. P/F ratios improved   - Tolerating CPAP trials well, need further improvement in MS prior to extubation however  - ABG showing Resp Alkalosis and Met Alkalosis   - Deproned  - f/u sputum culture   - Remdesivir held in setting of transaminitis   - CW Decadron (day 7/10)    - CW zosyn until 12/10  - s/p Toculizumab x1 on 12/1    GI  # transaminitis   In the setting of sepsis  improving, initiation of remdesivir may be contributing  - hold remdesivir and statin   - ctm     #Nutrition  - NGT w/tube feeds     RENAL  # MARISABEL most likely ATN from shock state- Resolved   Baseline of 1.1 and came up to 2.4, now close to back at baseline  A/w several electrolyte abnormalities - K 5.6, Phos 6.9 all resolved   - CTM    #Hypernatremia- Worsening  Na 150--> 153  Free water deficit is 3.9L  - 250 free water flushes q6 ---> 300cc q4     HEME  No active issues    Endo  #Hypothyroid  Takes home synthroid  - c/w this med     #Gout  - Hold allopurinol     F: None   E: Replete as necessary K>4 Mg>2  N: Tube feeds   DVT Prophylaxis: Lovenox 40mg daily   GI prophylaxis: IV PPI  CODE STATUS: FULL  86 y/o male with HTN, gout, hypothyroid, and previous TIA on xarelto, and with a recent diagnosis of Covid-19 on 11/29 who presented with acute altered mental status change, HD instability, and hypoxia 2/2 covid pneumonia with superimposed bacterial pneumonia, found to have b/l pulmonary consolidations on CT scan, s/p intubation, started on multiple pressors and antibiotics, patient now off pressors and more stable, pending improvement in MS for possible extubation.     NEURO  #Patient AOx0 in the ED, agitated acute mental status change per coworkers  Patient initially hypertensive and tachycardic but later became profoundly hypotensive   Presumed to be due to sepsis in the setting of covid pneumonia and possible super-imposed bacterial infection  was on several pressors as well as sedatives for several days, now off all pressors and on minimal sedation with propofol   - Patient still intubated, patient on minimal sedation, more alert today, following commands   - Today opening eyes, triggering the vent on CPAP trial, moving all 4 extremities     #Prior TIA  Patient reportedly taking xarelto for this, discussed with his wife  - Will hold in case he may need procedure  - Also should consider substituting for ASA later      CARDS  #Shock 2/2 sepsis- Resolving   Meeting 4/4 SIRS criteria in the ED  Hypotensive with MAPS in 50s  Now off all pressors   - Restarting home lisinopril now that MARISABEL resolved   - Will continue zosyn for 10 day course (7/10)    #HTN  Patients pressures increasing off pressors  Unable to take home ACE given MARISABEL  - restarted lisinopril  - dc coreg     #HLD  - holding home statin in setting of transaminitis     #NSTEMI  Presented with elevated troponins, ST depressions in V2-V3- was not trending  Patient noted to have ST depressions last night- Trop T 1.36, CKMB 7  Downtrended in the AM    PULM  #AHRF in the setting of COVID PNA  CT scan with b/l consolidations  Hypoxic into the 70s, altered, meeting 4/4 sirs criteria  Intubated. Patient tested + for covid on 11/29 and apparently is vaccinated   LFTs elevated. Good UOP, net even. Bx cultures neg. P/F ratios improved   - Tolerating CPAP trials well,   - ABG showing Resp Alkalosis and Met Alkalosis   - Deproned  - f/u sputum culture   - Remdesivir held in setting of transaminitis   - CW Decadron (day 7/10)    - CW zosyn until 12/10  - s/p Toculizumab x1 on 12/1    GI  # transaminitis   In the setting of sepsis  improving, initiation of remdesivir may be contributing  - hold remdesivir and statin   - ctm     #Nutrition  - NGT w/tube feeds     RENAL  # MARISABEL most likely ATN from shock state- Resolved   Baseline of 1.1 and came up to 2.4, now close to back at baseline  A/w several electrolyte abnormalities - K 5.6, Phos 6.9 all resolved   - CTM    #Hypernatremia- Worsening  Na 150--> 154  Free water deficit is 3.9L  - 250 free water flushes q6 ---> 300cc q4   - Started on D5 as well today  - repeat PM BMP     HEME  No active issues    Endo  #Hypothyroid  Takes home synthroid  - c/w this med     #Gout  - Hold allopurinol     ID  #Fever  - Continues to fever despite broad spectrum antibiotics  - Today    F: None   E: Replete as necessary K>4 Mg>2  N: Tube feeds   DVT Prophylaxis: Lovenox 40mg daily   GI prophylaxis: IV PPI  CODE STATUS: FULL  86 y/o male with HTN, gout, hypothyroid, and previous TIA on xarelto, and with a recent diagnosis of Covid-19 on 11/29 who presented with acute altered mental status change, HD instability, and hypoxia 2/2 covid pneumonia with superimposed bacterial pneumonia, found to have b/l pulmonary consolidations on CT scan, s/p intubation, started on multiple pressors and antibiotics, patient now off pressors and more stable, pending improvement in MS for possible extubation.     NEURO  #Patient AOx0 in the ED, agitated acute mental status change per coworkers  Patient initially hypertensive and tachycardic but later became profoundly hypotensive   Presumed to be due to sepsis in the setting of covid pneumonia and possible super-imposed bacterial infection  was on several pressors as well as sedatives for several days, now off all pressors and on minimal sedation with propofol   - Patient still intubated, patient on minimal sedation, more alert today, following commands   - Today opening eyes, triggering the vent on CPAP trial, moving all 4 extremities     #Prior TIA  Patient reportedly taking xarelto for this, discussed with his wife  - Will hold in case he may need procedure  - Also should consider substituting for ASA later      CARDS  #Shock 2/2 sepsis- Resolving   Meeting 4/4 SIRS criteria in the ED  Hypotensive with MAPS in 50s  Now off all pressors   - Restarting home lisinopril now that MARISABEL resolved   - Will continue vanco/zosyn for 10 day course (8/10)  - Reached out to vascular about digital necrosis   - No acute vascular surgical intervention at this time  - Daily wound care with betadine to gangrenous fingers and toes, keep gangrenous areas dry  - Ongoing GOC with family, palliative following     #HTN  Patients pressures increasing off pressors  Unable to take home ACE given MARISABEL  - restarted lisinopril  - dc coreg     #HLD  - holding home statin in setting of transaminitis     #NSTEMI  Presented with elevated troponins, ST depressions in V2-V3- was not trending  Patient noted to have ST depressions last night- Trop T 1.36, CKMB 7  Downtrended in the AM    PULM  #AHRF in the setting of COVID PNA  CT scan with b/l consolidations  Hypoxic into the 70s, altered, meeting 4/4 sirs criteria  Intubated. Patient tested + for covid on 11/29 and apparently is vaccinated   LFTs elevated. Good UOP, net even. Bx cultures neg. P/F ratios improved   - Tolerating CPAP trials well, now more responsive to commands, possible trial of extubation tomorrow   - ABG showing Resp Alkalosis and Met Alkalosis   - Deproned  - f/u sputum culture   - Remdesivir held in setting of transaminitis   - CW Decadron (day 8/10)    - CW Vanco+ zosyn until 12/10- may broaded   - s/p Toculizumab x1 on 12/1    GI  # transaminitis   In the setting of sepsis  improving, initiation of remdesivir may be contributing  - hold remdesivir and statin   - ctm     #Nutrition  - NGT w/tube feeds     RENAL  # MARISABEL most likely ATN from shock state- Resolved   Baseline of 1.1 and came up to 2.4, now close to back at baseline  A/w several electrolyte abnormalities - K 5.6, Phos 6.9 all resolved   - CTM    #Hypernatremia- Worsening  Na 150--> 154  Free water deficit is 3.9L  - 250 free water flushes q6 ---> 300cc q4   - Started on D5 as well today  - repeat PM BMP     HEME  No active issues    Endo  #Hypothyroid  Takes home synthroid  - c/w this med     #Gout  - Hold allopurinol     ID  #Fever  - Continues to fever despite broad spectrum antibiotics  - Blood and sputum cultures negative   - Presumed to have bacterial superinfection over covid pneumpnia  - Repeat Blood cultures today + fungal cultures  - C/w vanco/zosyn, van trough today  - Reached out to ID for meropenam      F: None   E: Replete as necessary K>4 Mg>2  N: Tube feeds   DVT Prophylaxis: Lovenox 40mg daily   GI prophylaxis: IV PPI  CODE STATUS: FULL  88 y/o male with HTN, gout, hypothyroid, and previous TIA on xarelto, and with a recent diagnosis of Covid-19 on 11/29 who presented with acute altered mental status change, HD instability, and hypoxia 2/2 covid pneumonia with superimposed bacterial pneumonia, found to have b/l pulmonary consolidations on CT scan, s/p intubation, started on multiple pressors and antibiotics, patient now off pressors and more stable, pending improvement in MS for possible extubation.     NEURO  #Patient AOx0 in the ED, agitated acute mental status change per coworkers  Patient initially hypertensive and tachycardic but later became profoundly hypotensive   Presumed to be due to sepsis in the setting of covid pneumonia and possible super-imposed bacterial infection  was on several pressors as well as sedatives for several days, now off all pressors and on minimal sedation with propofol   - Patient still intubated, patient on minimal sedation, more alert today, following commands   - Today opening eyes, triggering the vent on CPAP trial, moving all 4 extremities     #Prior TIA  Patient reportedly taking xarelto for this, discussed with his wife  - Will hold in case he may need procedure  - Also should consider substituting for ASA later      CARDS  #Shock 2/2 sepsis- Resolving   Meeting 4/4 SIRS criteria in the ED  Hypotensive with MAPS in 50s  Now off all pressors   - Restarting home lisinopril now that MARISABEL resolved   - Will continue vanco/zosyn for 10 day course (8/10)  - Reached out to vascular about digital necrosis   - No acute vascular surgical intervention at this time  - Daily wound care with betadine to gangrenous fingers and toes, keep gangrenous areas dry  - Ongoing GOC with family, palliative following     #HTN  Patients pressures increasing off pressors  Unable to take home ACE given MARISABEL  - restarted lisinopril  - dc coreg     #HLD  - holding home statin in setting of transaminitis     #NSTEMI  Presented with elevated troponins, ST depressions in V2-V3- was not trending  Patient noted to have ST depressions last night- Trop T 1.36, CKMB 7  Downtrended in the AM    PULM  #AHRF in the setting of COVID PNA  CT scan with b/l consolidations  Hypoxic into the 70s, altered, meeting 4/4 sirs criteria  Intubated. Patient tested + for covid on 11/29 and apparently is vaccinated   LFTs elevated. Good UOP, net even. Bx cultures neg. P/F ratios improved   - Tolerating CPAP trials well, now more responsive to commands, possible trial of extubation tomorrow   - ABG showing Resp Alkalosis and Met Alkalosis   - Deproned  - f/u sputum culture   - Remdesivir held in setting of transaminitis   - CW Decadron (day 8/10)    - CW Vanco+ zosyn until 12/10- now on polly   - s/p Toculizumab x1 on 12/1    GI  # transaminitis   In the setting of sepsis  improving, initiation of remdesivir may be contributing  - hold remdesivir and statin   - ctm     #Nutrition  - NGT w/tube feeds     RENAL  # MARISABEL most likely ATN from shock state- Resolved   Baseline of 1.1 and came up to 2.4, now close to back at baseline  A/w several electrolyte abnormalities - K 5.6, Phos 6.9 all resolved   - CTM    #Hypernatremia- Worsening  Na 150--> 154  Free water deficit is 3.9L  - 250 free water flushes q6 ---> 300cc q4   - Started on D5 as well today  - repeat PM BMP     HEME  No active issues    Endo  #Hypothyroid  Takes home synthroid  - c/w this med     #Hyperglycemia  May add on basal insulin given addition od D5    #Gout  - Hold allopurinol     ID  #Fever  - Continues to fever despite broad spectrum antibiotics  - Blood and sputum cultures negative   - Presumed to have bacterial superinfection over covid pneumpnia  - Repeat Blood cultures today + fungal cultures  - C/w vanco/zosyn, van trough today  - Reached out to ID for meropenam      F: None   E: Replete as necessary K>4 Mg>2  N: Tube feeds   DVT Prophylaxis: Lovenox 40mg daily   GI prophylaxis: IV PPI  CODE STATUS: FULL

## 2021-12-09 NOTE — PROGRESS NOTE ADULT - ASSESSMENT
86yo M with PMH of HTN, Hypothyroidism, on Anticoagulation p/w respiratory failure and found to have COVID. Palliative consulted for complex medical decision making in the setting of critical illness / ICU trigger.    ·	discussed recent updates with wife: MOLST completed with DNR / Do Not Re-Intubate  ·	wife in agreement that patient would not want to pursue amputations  ·	call wife tomorrow to set up a time for her to come see the patient  ·	if patient survives the weekend, will proceed with compassionate extubation -> wife would not want trach/PEG

## 2021-12-09 NOTE — PROGRESS NOTE ADULT - CONVERSATION DETAILS
Discussed patient's hospital course and recent developments. Explained that patient would require amputations of fingers/toes if full/aggressive interventions were to be pursued. Wife does not believe that is within patient's GOC given his active lifestyle. After reviewing the patient's HCP/Living Will, she is in agreement with DNR status and do not re-intubate. Patient would not accept trach/PEG so we will discuss palliative extubation in a few days if no improvement.

## 2021-12-09 NOTE — PROGRESS NOTE ADULT - SUBJECTIVE AND OBJECTIVE BOX
OVERNIGHT EVENTS:   - Uncomfortable and overbreathing the vent requring 5 pushes of IV fentanyl    SUBJECTIVE:   - Intubated and sedated     VITAL SIGNS:  T(F): 99.1 (12-09-21 @ 06:00)  HR: 59 (12-09-21 @ 12:00)  BP: 77/41 (12-09-21 @ 12:00)  RR: 39 (12-09-21 @ 12:00)  SpO2: 97% (12-09-21 @ 12:00)  Wt(kg): --      12-08-21 @ 07:01  -  12-09-21 @ 07:00  --------------------------------------------------------  IN: 4960.1 mL / OUT: 1970 mL / NET: 2990.1 mL    12-09-21 @ 07:01  -  12-09-21 @ 12:53  --------------------------------------------------------  IN: 1323 mL / OUT: 295 mL / NET: 1028 mL    Drips:  - Propofol     Lines/tubes:  - Left TLC  - Mitchell  - PIV x2    PHYSICAL EXAM:    Constitutional: Laying in bed, intubated, appears uncomfortable, belly breathing   Neurological: On sedation, however more alert today, opening his eyes, moving all 4 extremities, squeezing hand on command  HEENT:  neck supple, no masses, no JVD  Respiratory: Belly breathing, minor crackles bl, otherwise clear   Cardiovascular: RRR, normal S1S2, no murmurs or rubs   Gastrointestinal: +BS, not distended, not rigid  Extremities: Extremities are more warm distally in all 4 extremities, weak distal pulses  Skin: Patients feet, toes and fingers now appear mottled and dark, large open wound on the right forearm     MEDICATIONS  (STANDING):  chlorhexidine 0.12% Liquid 15 milliLiter(s) Oral Mucosa every 12 hours  chlorhexidine 2% Cloths 1 Application(s) Topical <User Schedule>  dexAMETHasone  Injectable 6 milliGRAM(s) IV Push every 24 hours  dexMEDEtomidine Infusion 0.2 MICROgram(s)/kG/Hr (4.23 mL/Hr) IV Continuous <Continuous>  dextrose 40% Gel 15 Gram(s) Oral once  dextrose 5%. 1000 milliLiter(s) (50 mL/Hr) IV Continuous <Continuous>  dextrose 5%. 1000 milliLiter(s) (150 mL/Hr) IV Continuous <Continuous>  dextrose 5%. 1000 milliLiter(s) (100 mL/Hr) IV Continuous <Continuous>  dextrose 50% Injectable 25 Gram(s) IV Push once  dextrose 50% Injectable 12.5 Gram(s) IV Push once  dextrose 50% Injectable 25 Gram(s) IV Push once  glucagon  Injectable 1 milliGRAM(s) IntraMuscular once  heparin  Infusion 1500 Unit(s)/Hr (15 mL/Hr) IV Continuous <Continuous>  insulin lispro (ADMELOG) corrective regimen sliding scale   SubCutaneous every 6 hours  levothyroxine Injectable 37.5 MICROGram(s) IV Push at bedtime  lisinopril 10 milliGRAM(s) Oral every 24 hours  meropenem  IVPB 1000 milliGRAM(s) IV Intermittent every 8 hours  pantoprazole   Suspension 40 milliGRAM(s) Enteral Tube every 24 hours  propofol Infusion 10 MICROgram(s)/kG/Min (5.08 mL/Hr) IV Continuous <Continuous>  senna 2 Tablet(s) Oral at bedtime    MEDICATIONS  (PRN):  acetaminophen    Suspension .. 650 milliGRAM(s) Enteral Tube every 6 hours PRN Temp greater or equal to 38C (100.4F)  fentaNYL    Injectable 50 MICROGram(s) IV Push every 2 hours PRN For RR>30    Allergies    Allergy Status Unknown    Intolerances    LABS:                        11.4   13.54 )-----------( 196      ( 09 Dec 2021 04:11 )             35.3     12-09    146<H>  |  112<H>  |  35<H>  ----------------------------<  139<H>  3.8   |  25  |  1.05    Ca    8.2<L>      09 Dec 2021 04:11  Phos  3.0     12-09  Mg     2.1     12-09    TPro  4.8<L>  /  Alb  2.4<L>  /  TBili  0.5  /  DBili  x   /  AST  103<H>  /  ALT  105<H>  /  AlkPhos  100  12-09    PT/INR - ( 09 Dec 2021 11:22 )   PT: 14.3 sec;   INR: 1.20          PTT - ( 09 Dec 2021 11:22 )  PTT:25.3 sec  Urinalysis Basic - ( 08 Dec 2021 11:37 )    Color: x / Appearance: x / SG: x / pH: x  Gluc: x / Ketone: x  / Bili: x / Urobili: x   Blood: x / Protein: x / Nitrite: x   Leuk Esterase: x / RBC: 5-10 /HPF / WBC < 5 /HPF   Sq Epi: x / Non Sq Epi: 0-5 /HPF / Bacteria: Present /HPF      RADIOLOGY & ADDITIONAL TESTS:  Reviewed    MICRO:   Culture - Blood (collected 08 Dec 2021 14:45)  Source: .Blood Blood  Preliminary Report (09 Dec 2021 03:01):    No growth at 12 hours    Culture - Sputum (collected 08 Dec 2021 11:46)  Source: .Sputum Sputum  Gram Stain (08 Dec 2021 20:35):    Few-moderate epithelial cells    Numerous white blood cells    No organisms seen  Preliminary Report (09 Dec 2021 10:58):    No growth to date    Urinalysis with Rflx Culture (collected 08 Dec 2021 10:23)    Culture - Sputum (collected 07 Dec 2021 13:34)  Source: .Sputum Sputum  Gram Stain (07 Dec 2021 17:32):    Few-moderate epithelial cells    Few-moderate White blood cells    No organisms seen  Final Report (07 Dec 2021 17:32):    Sputum specimen rejected.  Microscopic examination indicates    oropharyngeal contamination.  Please repeat.

## 2021-12-09 NOTE — PROGRESS NOTE ADULT - SUBJECTIVE AND OBJECTIVE BOX
[facility]  Chirag Finley, Palliative Care Attending  Contact Info: Call [phone] ([facility] Line) or message on Microsoft Teams (Chirag Finley)    SUBJECTIVE AND OBJECTIVE:  INTERVAL HPI/OVERNIGHT EVENTS: Interval events noted. Patient required PRNs of  in past 24hrs. No unexpected adverse effects of opiates noted: no sedation/dizziness/nausea.    ALLERGIES:  Allergy Status Unknown    MEDICATIONS  (STANDING):  chlorhexidine 0.12% Liquid 15 milliLiter(s) Oral Mucosa every 12 hours  chlorhexidine 2% Cloths 1 Application(s) Topical <User Schedule>  dexAMETHasone  Injectable 6 milliGRAM(s) IV Push every 24 hours  dexMEDEtomidine Infusion 0.2 MICROgram(s)/kG/Hr (4.23 mL/Hr) IV Continuous <Continuous>  dextrose 40% Gel 15 Gram(s) Oral once  dextrose 5%. 1000 milliLiter(s) (50 mL/Hr) IV Continuous <Continuous>  dextrose 5%. 1000 milliLiter(s) (150 mL/Hr) IV Continuous <Continuous>  dextrose 5%. 1000 milliLiter(s) (100 mL/Hr) IV Continuous <Continuous>  dextrose 50% Injectable 25 Gram(s) IV Push once  dextrose 50% Injectable 12.5 Gram(s) IV Push once  dextrose 50% Injectable 25 Gram(s) IV Push once  fentaNYL   Infusion. 0.5 MICROgram(s)/kG/Hr (4.23 mL/Hr) IV Continuous <Continuous>  glucagon  Injectable 1 milliGRAM(s) IntraMuscular once  heparin  Infusion 1500 Unit(s)/Hr (15 mL/Hr) IV Continuous <Continuous>  insulin lispro (ADMELOG) corrective regimen sliding scale   SubCutaneous every 6 hours  levothyroxine Injectable 37.5 MICROGram(s) IV Push at bedtime  lisinopril 10 milliGRAM(s) Oral every 24 hours  meropenem  IVPB 1000 milliGRAM(s) IV Intermittent every 8 hours  pantoprazole   Suspension 40 milliGRAM(s) Enteral Tube every 24 hours  propofol Infusion 10.008 MICROgram(s)/kG/Min (5.08 mL/Hr) IV Continuous <Continuous>  senna 2 Tablet(s) Oral at bedtime    MEDICATIONS  (PRN):  acetaminophen    Suspension .. 650 milliGRAM(s) Enteral Tube every 6 hours PRN Temp greater or equal to 38C (100.4F)      ITEMS UNCHECKED ARE NOT PRESENT    PRESENT SYMPTOMS: [x]Unable to obtain due to poor mentation/encephalopathy  Source if other than patient:  []Family   []Team     PAIN AD Score: 2g  [email or web address] (press ctrl +  left click to view)    Dyspnea:                           []Mild  []Moderate []Severe  Anxiety:                             []Mild []Moderate []Severe  Fatigue:                             []Mild []Moderate []Severe  Nausea:                             []Mild []Moderate []Severe  Loss of Appetite:              []Mild []Moderate []Severe  Constipation:                    []Mild []Moderate []Severe    Other Symptoms:  [x]All Other Review Of Systems Negative     Palliative Performance Status Version 2:  10%    [email or web address]    PHYSICAL EXAM:  GENERAL:  []Alert  []Oriented x   []Lethargic  []Cachexia  [x]Unarousable  []Verbal  [x]Non-Verbal  Behavioral:   []Anxiety  [x]Delirium []Agitation []Cooperative  HEENT:  []Normal   []Dry mouth   [x]ET Tube/Trach  []Oral lesions  PULMONARY:   []Clear [x]Tachypnea  []Audible excessive secretions   [x]Rhonchi        []Right []Left [x]Bilateral  []Crackles        []Right []Left []Bilateral  []Wheezing     []Right []Left []Bilateral  CARDIOVASCULAR:    []Regular []Irregular [x]Tachy  []Jony []Murmur []Other  GASTROINTESTINAL:  [x]Soft  [x]Distended   [x]+BS  []Non tender []Tender  []PEG [x]OGT/ NGT  Last BM:   GENITOURINARY:  []Normal [] Incontinent   []Oliguria/Anuria   [x]Mitchell  MUSCULOSKELETAL:   []Normal   []Weakness  [x]Bed/Wheelchair bound []Edema  NEUROLOGIC:   []No focal deficits  []Cognitive impairment  []Dysphagia []Dysarthria []Paresis [x]Encephalopathic   SKIN:   []Normal   []Pressure ulcer(s)  [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    CRITICAL CARE:  [x]Shock Present  [x]Septic [ ]Cardiogenic [ ]Neurologic [ ]Hypovolemic  [x]Vasopressors [ ]Inotropes   [x]Respiratory failure present [x]Mechanical Ventilation [ ]Non-invasive ventilatory support [ ]High-Flow  [x]Acute  [ ]Chronic [x]Hypoxic  [ ]Hypercarbic  [ ]Other organ failure      Vital Signs Last 24 Hrs  T(C): 37.3 (09 Dec 2021 06:00), Max: 37.5 (08 Dec 2021 21:54)  T(F): 99.1 (09 Dec 2021 06:00), Max: 99.5 (08 Dec 2021 21:54)  HR: 63 (09 Dec 2021 17:00) (59 - 83)  BP: 119/57 (09 Dec 2021 17:00) (77/41 - 178/73)  BP(mean): 82 (09 Dec 2021 17:00) (51 - 105)  RR: 44 (09 Dec 2021 17:00) (18 - 57)  SpO2: 97% (09 Dec 2021 17:00) (88% - 99%) I&O's Summary    08 Dec 2021 07:01  -  09 Dec 2021 07:00  --------------------------------------------------------  IN: 4960.1 mL / OUT: 1970 mL / NET: 2990.1 mL    09 Dec 2021 07:01  -  09 Dec 2021 18:15  --------------------------------------------------------  IN: 1770.5 mL / OUT: 470 mL / NET: 1300.5 mL    LABS:              11.4   13.54 )-----------( 196      ( 09 Dec 2021 04:11 )             35.3   12-09    143  |  x   |  x   ----------------------------<  x   4.7   |  27  |  1.10    Ca    8.2<L>      09 Dec 2021 04:11  Phos  3.0     12-09  Mg     2.1     12-09  TPro  4.8<L>  /  Alb  2.4<L>  /  TBili  0.5  /  DBili  x   /  AST  103<H>  /  ALT  105<H>  /  AlkPhos  100  12-09    Urinalysis Basic - ( 08 Dec 2021 11:37 )  Color: x / Appearance: x / SG: x / pH: x  Gluc: x / Ketone: x  / Bili: x / Urobili: x   Blood: x / Protein: x / Nitrite: x   Leuk Esterase: x / RBC: 5-10 /HPF / WBC < 5 /HPF   Sq Epi: x / Non Sq Epi: 0-5 /HPF / Bacteria: Present /HPF    RADIOLOGY & ADDITIONAL STUDIES: None new    REFERRALS:  [x]Social Work  []Case management []PT/OT []Chaplaincy  []Hospice  []Patient/Family Support    DISCUSSION OF CASE: Fredi Schreiber (wife) - to provide updates and emotional support    Goals of Care Document:   Care Coordination Document:     PROGRESS NOTE  Date & Time of Note   2021-12-07 11:00  Notes: Social work update note: Patient currently remains on 7 east, currently  on ventilator support, per team patient will remain on 7 east at this time.    remains available to follow as needed for dispo planning.   Emergency contact listed as wife Fredi Schreiber [phone].  Electronically signed by:  Mahi Haines  Electronically signed on:  2021-12-07  11:35       [facility]  Chirag Finley, Palliative Care Attending  Contact Info: Call [phone] (HEAL Line) or message on Microsoft Teams (Chirag Finley)    SUBJECTIVE AND OBJECTIVE:  INTERVAL HPI/OVERNIGHT EVENTS: Interval events noted. Patient required PRNs of Fentanyl x5 in past 24hrs. Intubated/sedated, unable to participate in interview. Extensive conversation with patient's wife regarding recent developments. She is opting for DNR/DNI status.    ALLERGIES:  Allergy Status Unknown    MEDICATIONS  (STANDING):  chlorhexidine 0.12% Liquid 15 milliLiter(s) Oral Mucosa every 12 hours  chlorhexidine 2% Cloths 1 Application(s) Topical <User Schedule>  dexAMETHasone  Injectable 6 milliGRAM(s) IV Push every 24 hours  dexMEDEtomidine Infusion 0.2 MICROgram(s)/kG/Hr (4.23 mL/Hr) IV Continuous <Continuous>  dextrose 40% Gel 15 Gram(s) Oral once  dextrose 5%. 1000 milliLiter(s) (50 mL/Hr) IV Continuous <Continuous>  dextrose 5%. 1000 milliLiter(s) (150 mL/Hr) IV Continuous <Continuous>  dextrose 5%. 1000 milliLiter(s) (100 mL/Hr) IV Continuous <Continuous>  dextrose 50% Injectable 25 Gram(s) IV Push once  dextrose 50% Injectable 12.5 Gram(s) IV Push once  dextrose 50% Injectable 25 Gram(s) IV Push once  fentaNYL   Infusion. 0.5 MICROgram(s)/kG/Hr (4.23 mL/Hr) IV Continuous <Continuous>  glucagon  Injectable 1 milliGRAM(s) IntraMuscular once  heparin  Infusion 1500 Unit(s)/Hr (15 mL/Hr) IV Continuous <Continuous>  insulin lispro (ADMELOG) corrective regimen sliding scale   SubCutaneous every 6 hours  levothyroxine Injectable 37.5 MICROGram(s) IV Push at bedtime  lisinopril 10 milliGRAM(s) Oral every 24 hours  meropenem  IVPB 1000 milliGRAM(s) IV Intermittent every 8 hours  pantoprazole   Suspension 40 milliGRAM(s) Enteral Tube every 24 hours  propofol Infusion 10.008 MICROgram(s)/kG/Min (5.08 mL/Hr) IV Continuous <Continuous>  senna 2 Tablet(s) Oral at bedtime    MEDICATIONS  (PRN):  acetaminophen    Suspension .. 650 milliGRAM(s) Enteral Tube every 6 hours PRN Temp greater or equal to 38C (100.4F)      ITEMS UNCHECKED ARE NOT PRESENT    PRESENT SYMPTOMS: [x]Unable to obtain due to poor mentation/encephalopathy  Source if other than patient:  []Family   []Team     PAIN AD Score: 2  [email or web address]/[email or web address] (press ctrl +  left click to view)    Dyspnea:                           []Mild  []Moderate []Severe  Anxiety:                             []Mild []Moderate []Severe  Fatigue:                             []Mild []Moderate []Severe  Nausea:                             []Mild []Moderate []Severe  Loss of Appetite:              []Mild []Moderate []Severe  Constipation:                    []Mild []Moderate []Severe    Other Symptoms:  [x]All Other Review Of Systems Negative     Palliative Performance Status Version 2:  10%    [email or web address]_[email or web address]    PHYSICAL EXAM:  GENERAL:  []Alert  []Oriented x   []Lethargic  []Cachexia  [x]Unarousable  []Verbal  [x]Non-Verbal  Behavioral:   []Anxiety  [x]Delirium []Agitation []Cooperative  HEENT:  []Normal   []Dry mouth   [x]ET Tube/Trach  []Oral lesions  PULMONARY:   []Clear [x]Tachypnea  []Audible excessive secretions   [x]Rhonchi        []Right []Left [x]Bilateral  []Crackles        []Right []Left []Bilateral  []Wheezing     []Right []Left []Bilateral  CARDIOVASCULAR:    []Regular []Irregular [x]Tachy  []Jony []Murmur []Other  GASTROINTESTINAL:  [x]Soft  [x]Distended   [x]+BS  []Non tender []Tender  []PEG [x]OGT/ NGT  Last BM:   GENITOURINARY:  []Normal [] Incontinent   []Oliguria/Anuria   [x]Mitchell  MUSCULOSKELETAL:   []Normal   []Weakness  [x]Bed/Wheelchair bound []Edema  NEUROLOGIC:   []No focal deficits  []Cognitive impairment  []Dysphagia []Dysarthria []Paresis [x]Encephalopathic   SKIN:   []Normal   []Pressure ulcer(s)  [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    CRITICAL CARE:  [x]Shock Present  [x]Septic [ ]Cardiogenic [ ]Neurologic [ ]Hypovolemic  [x]Vasopressors [ ]Inotropes   [x]Respiratory failure present [x]Mechanical Ventilation [ ]Non-invasive ventilatory support [ ]High-Flow  [x]Acute  [ ]Chronic [x]Hypoxic  [ ]Hypercarbic  [ ]Other organ failure      Vital Signs Last 24 Hrs  T(C): 37.3 (09 Dec 2021 06:00), Max: 37.5 (08 Dec 2021 21:54)  T(F): 99.1 (09 Dec 2021 06:00), Max: 99.5 (08 Dec 2021 21:54)  HR: 63 (09 Dec 2021 17:00) (59 - 83)  BP: 119/57 (09 Dec 2021 17:00) (77/41 - 178/73)  BP(mean): 82 (09 Dec 2021 17:00) (51 - 105)  RR: 44 (09 Dec 2021 17:00) (18 - 57)  SpO2: 97% (09 Dec 2021 17:00) (88% - 99%) I&O's Summary    08 Dec 2021 07:01  -  09 Dec 2021 07:00  --------------------------------------------------------  IN: 4960.1 mL / OUT: 1970 mL / NET: 2990.1 mL    09 Dec 2021 07:01  -  09 Dec 2021 18:15  --------------------------------------------------------  IN: 1770.5 mL / OUT: 470 mL / NET: 1300.5 mL    LABS:              11.4   13.54 )-----------( 196      ( 09 Dec 2021 04:11 )             35.3   12-09    143  |  x   |  x   ----------------------------<  x   4.7   |  27  |  1.10    Ca    8.2<L>      09 Dec 2021 04:11  Phos  3.0     12-09  Mg     2.1     12-09  TPro  4.8<L>  /  Alb  2.4<L>  /  TBili  0.5  /  DBili  x   /  AST  103<H>  /  ALT  105<H>  /  AlkPhos  100  12-09    Urinalysis Basic - ( 08 Dec 2021 11:37 )  Color: x / Appearance: x / SG: x / pH: x  Gluc: x / Ketone: x  / Bili: x / Urobili: x   Blood: x / Protein: x / Nitrite: x   Leuk Esterase: x / RBC: 5-10 /HPF / WBC < 5 /HPF   Sq Epi: x / Non Sq Epi: 0-5 /HPF / Bacteria: Present /HPF    RADIOLOGY & ADDITIONAL STUDIES: None new    REFERRALS:  [x]Social Work  []Case management []PT/OT []Chaplaincy  []Hospice  []Patient/Family Support    DISCUSSION OF CASE: Fredi Schreiber (wife) - to provide updates and emotional support    Goals of Care Document:   Care Coordination Document:     PROGRESS NOTE  Date & Time of Note   2021-12-07 11:00  Notes: Social work update note: Patient currently remains on 7 east, currently  on ventilator support, per team patient will remain on 7 east at this time.    remains available to follow as needed for dispo planning.   Emergency contact listed as wife Fredi Schreiber [phone].  Electronically signed by:  Maih Haines  Electronically signed on:  2021-12-07  11:35

## 2021-12-09 NOTE — PROGRESS NOTE ADULT - ASSESSMENT
86 y/o male with HTN, gout, hypothyroid, and previous TIA on xarelto, and with a recent diagnosis of Covid-19 on 11/29 who presented with acute altered mental status change, HD instability, and hypoxia 2/2 covid pneumonia with superimposed bacterial pneumonia, found to have b/l pulmonary consolidations on CT scan, s/p intubation, started on multiple pressors and antibiotics, patient now off pressors and more stable, pending improvement in MS for possible extubation.     NEURO  Encephalopathy in the setting of sepsis and hypoxia   Patient AOx0 in the ED, agitated acute mental status change per coworkers  Patient initially hypertensive and tachycardic but later became profoundly hypotensive   Presumed to be due to sepsis in the setting of covid pneumonia and possible super-imposed bacterial infection  was on several pressors as well as sedatives for several days, now off all pressors and on sedation with propofol   - Patient very alert and following commands today  - However appears uncomfortable, not ready to be extubated as he is pretty tachypneic  - Fentanyl drip today    #Prior TIA  Patient reportedly taking xarelto for this, discussed with his wife  - Will hold in case he may need procedure  - Also should consider substituting for ASA later      CARDS  #Shock 2/2 sepsis- Resolving   Meeting 4/4 SIRS criteria in the ED  Hypotensive with MAPS in 50s  Now off all pressors   - Will continue meropenam for 10 day course (complete 12/10)  - Reached out to vascular about digital necrosis- No acute vascular surgical intervention at this time as wounds are not infected patient is still intubated and actively with covid infection. Please reconsult once patient recovers and wants to pursue amputation per GOC  - Hand surgery consulted for digital necrosis of fingers   - Daily wound care with betadine to gangrenous fingers and toes, keep gangrenous areas dry  - Ongoing GOC with family, palliative following     #Ischemic limb necrosis   - Vascular consulted for LE ischemia  - Hand surgery consulted for UE digit necrosis  - Starting Heparin ggt today  - Palliative reached out to patients wife about this yesterday, she has not made any decisions in regards to what they would want done     #HTN  Patients pressures increasing off pressors  Unable to take home ACE given MARISABEL  - restarted lisinopril    #HLD  - holding home statin in setting of transaminitis     #NSTEMI  Presented with elevated troponins, ST depressions in V2-V3- was not trending  Patient noted to have ST depressions last night- Trop T 1.36, CKMB 7  Downtrended in the AM    PULM  #AHRF in the setting of COVID PNA  CT scan with b/l consolidations  Hypoxic into the 70s, altered, meeting 4/4 sirs criteria  Intubated. Patient tested + for covid on 11/29 and apparently is vaccinated   LFTs elevated. Good UOP. Bx cultures neg. P/F ratios improved   - had been tolerating CPAP trials well, however overnight patient very tachypneic, belly breathing, uncomfortable appearing-possibly due to aggitation from inadequate sedaton vs worsening of pulmonary process  - Deproned  - sputum culture Neg  - Remdesivir held in setting of transaminitis   - CW Decadron (day 9/10)    - now on polly until 12/10   - s/p Toculizumab x1 on 12/1    #Tachypnea  Tachypneic and overbreathing vent overnight  May be pain/anxiety driven  - Requiring a lot of pressure support on CPAP  - fentanyl drip   - Not ready for extubation today     GI  # transaminitis   In the setting of sepsis  improving, initiation of remdesivir may be contributing  - hold remdesivir and statin   - ctm     #Nutrition  - NGT w/tube feeds     RENAL  # MARISABEL most likely ATN from shock state- Resolved   Baseline of 1.1 and came up to 2.4, now close to back at baseline  A/w several electrolyte abnormalities - K 5.6, Phos 6.9 all resolved   - CTM    #Hypernatremia- improving   Na 150--> 154--> 146  - DC free water flushes   - cw D5  today  - repeat PM BMP     HEME  No active issues    Endo  #Hypothyroid  Takes home synthroid  - c/w this med     #Hyperglycemia  Sugars stable  Not requiring a lot of sliding scale     #Gout  - Hold allopurinol     ID  #Fever  Now afebrile on meropenam   - Continues to fever despite broad spectrum antibiotics  - Blood and sputum cultures negative   - Presumed to have bacterial superinfection over covid pneumpnia  - fungal cultures pending   - C/w meropenam      F: None   E: Replete as necessary K>4 Mg>2  N: Tube feeds   DVT Prophylaxis: Lovenox 40mg daily   GI prophylaxis: IV PPI  CODE STATUS: FULL

## 2021-12-10 NOTE — CHART NOTE - NSCHARTNOTESELECT_GEN_ALL_CORE
Anti-infective approval note/Event Note
Palliative Care Coordination
Palliative Care Coordination
Follow-up/Nutrition Services
Nutrition Follow Up/Nutrition Services
Proning
Proning/Event Note

## 2021-12-10 NOTE — PROGRESS NOTE ADULT - ASSESSMENT
88 y/o male with HTN, gout, hypothyroid, and previous TIA on xarelto, and with a recent diagnosis of Covid-19 on 11/29 who presented with acute altered mental status change, HD instability, and hypoxia 2/2 covid pneumonia with superimposed bacterial pneumonia, found to have b/l pulmonary consolidations on CT scan, s/p intubation, started on multiple pressors and antibiotics, patient now off pressors and more stable, pending improvement in MS for possible extubation.     NEURO  Encephalopathy in the setting of sepsis and hypoxia   Patient AOx0 in the ED, agitated acute mental status change per coworkers  Patient initially hypertensive and tachycardic but later became profoundly hypotensive   Presumed to be due to sepsis in the setting of covid pneumonia and possible super-imposed bacterial infection  was on several pressors as well as sedatives for several days, now off all pressors and on sedation with propofol   - Patient very alert and following commands today  - However appears uncomfortable, not ready to be extubated as he is pretty tachypneic on high pressure support  - Up on Fentanyl drip, started precedex  - Patient made DNR/DN reintubate    #Prior TIA  Patient reportedly taking xarelto for this, discussed with his wife  - Will hold in case he may need procedure  - Also should consider substituting for ASA later      CARDS  #Shock 2/2 sepsis- Resolving   Meeting 4/4 SIRS criteria in the ED  Hypotensive with MAPS in 50s  Now off all pressors   - Will continue meropenam for 10 day course- need to reach out to ID about treatment course   - Reached out to vascular about digital necrosis- No acute vascular surgical intervention at this time as wounds are not infected patient is still intubated and actively with covid infection. Please reconsult once patient recovers and wants to pursue amputation per GOC  - Hand surgery consulted for digital necrosis of fingers   - Daily wound care with betadine to gangrenous fingers and toes, keep gangrenous areas dry  - Ongoing GOC with family, palliative following     #Ischemic limb necrosis   - Vascular consulted for LE ischemia  - Hand surgery consulted for UE digit necrosis  - Starting Heparin ggt today  - Palliative reached out to patients wife about this yesterday, she has not made any decisions in regards to what they would want done   - Wife does not want to pursue amputations at this time, if patient does not make meaningful recovery by Monday, family considering terminal wean     #HTN  Patients pressures increasing off pressors  Unable to take home ACE given MARISABEL  - Holding lisinopril    #HLD  - holding home statin in setting of transaminitis     #NSTEMI  Presented with elevated troponins, ST depressions in V2-V3- was not trending  Patient noted to have ST depressions last night- Trop T 1.36, CKMB 7  Downtrended in the AM    PULM  #AHRF in the setting of COVID PNA  CT scan with b/l consolidations  Hypoxic into the 70s, altered, meeting 4/4 sirs criteria  Intubated. Patient tested + for covid on 11/29 and apparently is vaccinated   LFTs elevated. Good UOP. Bx cultures neg. P/F ratios improved   - had been tolerating CPAP trials well, however overnight patient very tachypneic, belly breathing, uncomfortable appearing-possibly due to aggitation from inadequate sedaton vs worsening of pulmonary process  On CPAP all night- Still on high pressure support and PEEP, slightly less tachypneic than yesterday but appears uncomfortable  - Up on fentanyl   - Deproned  - sputum culture Neg  - Remdesivir held in setting of transaminitis   - Completed decadron  today  - now on polly    - s/p Toculizumab x1 on 12/1  - Patient largely positive overnight, will do 40mg IV Lasix trial     #Tachypnea  Tachypneic and overbreathing vent overnight  May be pain/anxiety driven  - Requiring a lot of pressure support on CPAP  - fentanyl drip   - Not ready for extubation today     GI  # transaminitis   In the setting of sepsis  improving, initiation of remdesivir may be contributing  - hold remdesivir and statin   - ctm     #Nutrition  - NGT w/tube feeds     RENAL  # MARISABEL most likely ATN from shock state- Resolved   Baseline of 1.1 and came up to 2.4, now close to back at baseline  A/w several electrolyte abnormalities - K 5.6, Phos 6.9 all resolved   - CTM    #Hypernatremia- Resolved    HEME  No active issues    Endo  #Hypothyroid  Takes home synthroid  - c/w this med     #Gout  - Hold allopurinol     ID  #Fever  Now afebrile on meropenam   - Continues to fever despite broad spectrum antibiotics  - Blood and sputum cultures negative   - Presumed to have bacterial superinfection over covid pneumpnia  - fungal cultures pending   - C/w meropenam    - Clarify with ID about length of duration    F: None   E: Replete as necessary K>4 Mg>2  N: Tube feeds   DVT Prophylaxis: Heparin  GI prophylaxis: IV PPI  CODE STATUS: DNR

## 2021-12-10 NOTE — PROGRESS NOTE ADULT - SUBJECTIVE AND OBJECTIVE BOX
OVERNIGHT EVENTS:  - Supratherapeutic PTT, went down on heparin  - MAPS <65, patient started on levo and went down on sedation  - CPAP all night    SUBJECTIVE:   - Appearing uncomfortable    VITAL SIGNS:  T(F): 99.1 (12-10-21 @ 09:00)  HR: 79 (12-10-21 @ 12:00)  BP: 138/60 (12-10-21 @ 12:00)  RR: 33 (12-10-21 @ 12:00)  SpO2: 89% (12-10-21 @ 12:00    12-09-21 @ 07:01  -  12-10-21 @ 07:00  --------------------------------------------------------  IN: 4737.4 mL / OUT: 1305 mL / NET: 3432.4 mL    12-10-21 @ 07:01  -  12-10-21 @ 13:22  --------------------------------------------------------  IN: 526.3 mL / OUT: 490 mL / NET: 36.3 mL    Drips:  - Fentanyl 15  - Precedex  - Off propofol and levo  - Heparin     PHYSICAL EXAM:    Constitutional: Laying in bed, intubated, appears uncomfortable, belly breathing   Neurological: On sedation, however more alert today, opening his eyes, moving all 4 extremities, squeezing hand on command, shaking his head  HEENT:  neck supple, no masses, no JVD  Respiratory: Belly breathing, minor crackles bl, otherwise clear   Cardiovascular: RRR, normal S1S2, no murmurs or rubs   Gastrointestinal: +BS, not distended, not rigid  Extremities: Extremities are more warm distally in all 4 extremities, weak distal pulses  Skin: Patients feet, toes and fingers now appear mottled and dark, large open wound on the right forearm - worsening, new left forearm wound    MEDICATIONS  (STANDING):  chlorhexidine 0.12% Liquid 15 milliLiter(s) Oral Mucosa every 12 hours  chlorhexidine 2% Cloths 1 Application(s) Topical <User Schedule>  dexAMETHasone  Injectable 6 milliGRAM(s) IV Push every 24 hours  dexMEDEtomidine Infusion 0.2 MICROgram(s)/kG/Hr (4.23 mL/Hr) IV Continuous <Continuous>  dextrose 40% Gel 15 Gram(s) Oral once  dextrose 5%. 1000 milliLiter(s) (50 mL/Hr) IV Continuous <Continuous>  dextrose 5%. 1000 milliLiter(s) (100 mL/Hr) IV Continuous <Continuous>  dextrose 50% Injectable 25 Gram(s) IV Push once  dextrose 50% Injectable 12.5 Gram(s) IV Push once  dextrose 50% Injectable 25 Gram(s) IV Push once  fentaNYL   Infusion. 0.5 MICROgram(s)/kG/Hr (4.23 mL/Hr) IV Continuous <Continuous>  glucagon  Injectable 1 milliGRAM(s) IntraMuscular once  heparin  Infusion 1300 Unit(s)/Hr (13 mL/Hr) IV Continuous <Continuous>  insulin lispro (ADMELOG) corrective regimen sliding scale   SubCutaneous every 6 hours  levothyroxine Injectable 37.5 MICROGram(s) IV Push at bedtime  meropenem  IVPB 1000 milliGRAM(s) IV Intermittent every 8 hours  norepinephrine Infusion 0.05 MICROgram(s)/kG/Min (7.93 mL/Hr) IV Continuous <Continuous>  pantoprazole   Suspension 40 milliGRAM(s) Enteral Tube every 24 hours  senna 2 Tablet(s) Oral at bedtime    MEDICATIONS  (PRN):  acetaminophen    Suspension .. 650 milliGRAM(s) Enteral Tube every 6 hours PRN Temp greater or equal to 38C (100.4F)    Allergies    Allergy Status Unknown    Intolerances    LABS:                        11.5   16.26 )-----------( 207      ( 10 Dec 2021 06:29 )             36.2     12-10    143  |  108  |  42<H>  ----------------------------<  128<H>  4.1   |  26  |  1.14    Ca    8.0<L>      10 Dec 2021 06:28  Phos  3.3     12-10  Mg     2.3     12-10    TPro  4.9<L>  /  Alb  2.4<L>  /  TBili  0.5  /  DBili  x   /  AST  93<H>  /  ALT  95<H>  /  AlkPhos  124<H>  12-10    PT/INR - ( 09 Dec 2021 11:22 )   PT: 14.3 sec;   INR: 1.20          PTT - ( 10 Dec 2021 12:15 )  PTT:79.1 sec    RADIOLOGY & ADDITIONAL TESTS:  Reviewed    MICRO:     Culture - Fungal, Blood (collected 09 Dec 2021 02:50)  Source: .Blood Blood  Preliminary Report (10 Dec 2021 08:05):    Testing in progress    Culture - Blood (collected 08 Dec 2021 14:45)  Source: .Blood Blood  Preliminary Report (09 Dec 2021 15:00):    No growth at 1 day.    Culture - Sputum (collected 08 Dec 2021 11:46)  Source: .Sputum Sputum  Gram Stain (08 Dec 2021 20:35):    Few-moderate epithelial cells    Numerous white blood cells    No organisms seen  Final Report (10 Dec 2021 10:48):    Mold growth    Testing performed by:    [facility]    [address] [place] [address]    Urinalysis with Rflx Culture (collected 08 Dec 2021 10:23)    Culture - Sputum (collected 07 Dec 2021 13:34)  Source: .Sputum Sputum  Gram Stain (07 Dec 2021 17:32):    Few-moderate epithelial cells    Few-moderate White blood cells    No organisms seen  Final Report (07 Dec 2021 17:32):    Sputum specimen rejected.  Microscopic examination indicates    oropharyngeal contamination.  Please repeat.

## 2021-12-10 NOTE — CHART NOTE - NSCHARTNOTEFT_GEN_A_CORE
Admitting Diagnosis:   Patient is a 87y old  Male who presents with a chief complaint of COVID respiratory failure (08 Dec 2021 13:55)      PAST MEDICAL & SURGICAL HISTORY:      Current Nutrition Order:  Jevity 1.5 Eliceo @ 53ml/hr x 24hrs plus 3 LPS (300kcal, 45g pro) via NG (1272ml TV, 2208kcal/2475kcal w/propofol, 126g pro, 965ml free H2O, 1.5g/kg ABW protein)     PO Intake: Good (%) [   ]  Fair (50-75%) [   ] Poor (<25%) [   ]- NA NPO w/EN    GI Issues: Unable to assess at this time 2/2 vent   No episodes of emesis recorded  BM 12/9    Pain: Unable to assess at this time 2/2 vent, sedated    Skin Integrity: Kiran 9, B/L arms 2+ edema, B/L ankles 3+ edema  Intact pressure-wise     Labs:   12-10    143  |  108  |  42<H>  ----------------------------<  128<H>  4.1   |  26  |  1.14    Ca    8.0<L>      10 Dec 2021 06:28  Phos  3.3     12-10  Mg     2.3     12-10    TPro  4.9<L>  /  Alb  2.4<L>  /  TBili  0.5  /  DBili  x   /  AST  93<H>  /  ALT  95<H>  /  AlkPhos  124<H>  12-10    CAPILLARY BLOOD GLUCOSE      POCT Blood Glucose.: 198 mg/dL (10 Dec 2021 00:06)  POCT Blood Glucose.: 142 mg/dL (09 Dec 2021 17:19)  POCT Blood Glucose.: 97 mg/dL (09 Dec 2021 12:05)      Medications:  MEDICATIONS  (STANDING):  chlorhexidine 0.12% Liquid 15 milliLiter(s) Oral Mucosa every 12 hours  chlorhexidine 2% Cloths 1 Application(s) Topical <User Schedule>  dexAMETHasone  Injectable 6 milliGRAM(s) IV Push every 24 hours  dexMEDEtomidine Infusion 0.2 MICROgram(s)/kG/Hr (4.23 mL/Hr) IV Continuous <Continuous>  dextrose 40% Gel 15 Gram(s) Oral once  dextrose 5%. 1000 milliLiter(s) (50 mL/Hr) IV Continuous <Continuous>  dextrose 5%. 1000 milliLiter(s) (100 mL/Hr) IV Continuous <Continuous>  dextrose 50% Injectable 25 Gram(s) IV Push once  dextrose 50% Injectable 12.5 Gram(s) IV Push once  dextrose 50% Injectable 25 Gram(s) IV Push once  fentaNYL   Infusion. 0.5 MICROgram(s)/kG/Hr (4.23 mL/Hr) IV Continuous <Continuous>  glucagon  Injectable 1 milliGRAM(s) IntraMuscular once  heparin  Infusion 1300 Unit(s)/Hr (13 mL/Hr) IV Continuous <Continuous>  insulin lispro (ADMELOG) corrective regimen sliding scale   SubCutaneous every 6 hours  levothyroxine Injectable 37.5 MICROGram(s) IV Push at bedtime  meropenem  IVPB 1000 milliGRAM(s) IV Intermittent every 8 hours  norepinephrine Infusion 0.05 MICROgram(s)/kG/Min (7.93 mL/Hr) IV Continuous <Continuous>  pantoprazole   Suspension 40 milliGRAM(s) Enteral Tube every 24 hours  propofol Infusion 10.008 MICROgram(s)/kG/Min (5.08 mL/Hr) IV Continuous <Continuous>  senna 2 Tablet(s) Oral at bedtime    MEDICATIONS  (PRN):  acetaminophen    Suspension .. 650 milliGRAM(s) Enteral Tube every 6 hours PRN Temp greater or equal to 38C (100.4F)      Anthropometrics:  Ht: 188cm (74")  Wt: 84.6kg (12/1)- Admit     IBW: 190# (86.4kg)  % IBW: 98%    Weight Change: No new weight per EMR. Recommend daily weights.     Estimated energy needs: 84.6kg  Actual BW used for calculations as pt weighs <100% of IBW, per critical care nutrition guidelines. Needs estimated for age and adjusted for vent demands, COVID infection.   Calories: 2100-2550kcals (25-30kcals/kg)  Protein: 110-135g (1.3-1.6g/kg)  Fluid: Per team 2/2 compromised respiratory status    Subjective:   88 y/o male with HTN, gout, hypothyroid, and previous TIA on xarelto, and with a recent diagnosis of Covid-19 2 days ago who is presenting with acute altered mental status change and HD instability, found to have b/l pulmonary consolidations on CT scan, now intubated for respiratory distress, started on levophed and antibiotics. Course c/b limb/digit ischemia, vascular following. Unable to tolerate SAT/SBT due to hypoxia and tachypnea concerning for ongoing infection. Palliative/GOC discussions ongoing- currently DNR/do not reintubate and unlikely to have trach/PEG. Pt discussed during MICU rounds. Pt remains intubated, on spontaneous breathing trial this AM. Sedated on propofol @ 10.1ml/hr (267kcal/day from lipids), and fentanyl. MAP 65- not on pressors at this time. EN running at goal of 53ml/hr with no s/s intolerance. BM 12/9. Temp 99.1F this AM. Pertinent labs: WBC 16.26 (H), lytes WNL, POC , 142mg/dL, BUN 42/Cr 1.14 (H). Will continue to follow per RD protocol.       Previous Nutrition Diagnosis: Inadequate oral intake R/T intubated % nutrient needs via dobhoff (EN)     Active [ X  ]  Resolved [  ]    If resolved, new PES:    Goal: Meet >75% of nutrient needs via appropriate route     Recommendations:  1. Continue with current EN order. Monitor for s/s intolerance; maintain aspiration precautions at all times. Additional free H2O flushes per team   2. Monitor lytes and replete prn. POC BG q6hr (goal 140-180mg/dL)  3. Pain and bowel regimens per team   4. Keep nutrition aligned with GOC at all times   *d/w team during rounds    Education: not indicated/appropriate at this time     Risk Level: High [ x  ] Moderate [   ] Low [   ]

## 2021-12-11 NOTE — PROGRESS NOTE ADULT - SUBJECTIVE AND OBJECTIVE BOX
OVERNIGHT EVENTS: NAEO    SUBJECTIVE / INTERVAL HPI: Patient seen and examined at bedside. Intubated and sedated.    Remaining ROS negative     PHYSICAL EXAM:  Constitutional: Laying in bed, intubated, appears uncomfortable, belly breathing   Neurological: On sedation, however more alert today, opening his eyes, moving all 4 extremities, squeezing hand on command, shaking his head  HEENT:  neck supple, no masses, no JVD  Respiratory: Belly breathing, minor crackles bl, otherwise clear   Cardiovascular: RRR, normal S1S2, no murmurs or rubs   Gastrointestinal: +BS, not distended, not rigid  Extremities: Extremities are more warm distally in all 4 extremities, weak distal pulses  Skin: Patients feet, toes and fingers now appear mottled and dark, large open wound on the right forearm - worsening, new left forearm wound    VITAL SIGNS:  Vital Signs Last 24 Hrs  T(C): 38.2 (11 Dec 2021 13:36), Max: 38.2 (11 Dec 2021 13:36)  T(F): 100.8 (11 Dec 2021 13:36), Max: 100.8 (11 Dec 2021 13:36)  HR: 70 (11 Dec 2021 16:00) (55 - 103)  BP: 119/58 (11 Dec 2021 16:00) (94/44 - 181/78)  BP(mean): 84 (11 Dec 2021 16:00) (63 - 112)  RR: 26 (11 Dec 2021 16:00) (19 - 47)  SpO2: 98% (11 Dec 2021 16:00) (84% - 98%)    MEDICATIONS:  MEDICATIONS  (STANDING):  albuterol/ipratropium for Nebulization 3 milliLiter(s) Nebulizer every 6 hours  chlorhexidine 0.12% Liquid 15 milliLiter(s) Oral Mucosa every 12 hours  chlorhexidine 2% Cloths 1 Application(s) Topical <User Schedule>  dexMEDEtomidine Infusion 0.2 MICROgram(s)/kG/Hr (4.23 mL/Hr) IV Continuous <Continuous>  dextrose 40% Gel 15 Gram(s) Oral once  dextrose 5%. 1000 milliLiter(s) (50 mL/Hr) IV Continuous <Continuous>  dextrose 5%. 1000 milliLiter(s) (100 mL/Hr) IV Continuous <Continuous>  dextrose 50% Injectable 25 Gram(s) IV Push once  dextrose 50% Injectable 12.5 Gram(s) IV Push once  dextrose 50% Injectable 25 Gram(s) IV Push once  fentaNYL   Infusion. 0.5 MICROgram(s)/kG/Hr (4.23 mL/Hr) IV Continuous <Continuous>  glucagon  Injectable 1 milliGRAM(s) IntraMuscular once  heparin  Infusion 1300 Unit(s)/Hr (13 mL/Hr) IV Continuous <Continuous>  insulin lispro (ADMELOG) corrective regimen sliding scale   SubCutaneous every 6 hours  levothyroxine Injectable 37.5 MICROGram(s) IV Push at bedtime  meropenem  IVPB 1000 milliGRAM(s) IV Intermittent every 8 hours  metoclopramide Injectable 5 milliGRAM(s) IV Push every 8 hours  norepinephrine Infusion 0.02 MICROgram(s)/kG/Min (3.17 mL/Hr) IV Continuous <Continuous>  pantoprazole   Suspension 40 milliGRAM(s) Enteral Tube every 24 hours  propofol Infusion 5 MICROgram(s)/kG/Min (2.54 mL/Hr) IV Continuous <Continuous>  senna 2 Tablet(s) Oral at bedtime    MEDICATIONS  (PRN):  acetaminophen    Suspension .. 650 milliGRAM(s) Enteral Tube every 6 hours PRN Temp greater or equal to 38C (100.4F)      ALLERGIES:  Allergy Status Unknown    LABS:                        12.0   14.90 )-----------( 206      ( 11 Dec 2021 05:37 )             36.5     12-11    145  |  110<H>  |  56<H>  ----------------------------<  106<H>  4.6   |  27  |  0.99    Ca    8.4      11 Dec 2021 05:38  Phos  3.7     12-11  Mg     2.5     12-11    TPro  5.2<L>  /  Alb  2.5<L>  /  TBili  0.5  /  DBili  x   /  AST  125<H>  /  ALT  118<H>  /  AlkPhos  130<H>  12-11    PTT - ( 11 Dec 2021 07:26 )  PTT:88.5 sec    CAPILLARY BLOOD GLUCOSE  POCT Blood Glucose.: 130 mg/dL (11 Dec 2021 12:42)    RADIOLOGY & ADDITIONAL TESTS: Reviewed.

## 2021-12-11 NOTE — BRIEF OPERATIVE NOTE - NSICDXBRIEFPOSTOP_GEN_ALL_CORE_FT
POST-OP DIAGNOSIS:  Acute hypoxemic respiratory failure due to COVID-19 11-Dec-2021 16:08:15  Juan Manuel Hawkins R

## 2021-12-11 NOTE — BRIEF OPERATIVE NOTE - OPERATION/FINDINGS
PROCEDURE DESCRIPTION:  Procedure was performed emergently under implied consent, due to hyperacute respiratory distress/extremis while on ventilator. Patient was already receiving sedative infusions while intubated in ICU. An Ambu-Slim flexible, disposable bronchoscope was inserted through size 7.5mm ETT and into the distal trachea. The airways were inspected to their segmental levels bilaterally to evaluate for obstruction or mucus plugging. Bronchial lavage performed bilaterally, each with 10mL saline. The bronchoscope was then withdrawn and the procedure completed.     PROCEDURE FINDINGS:  Distal trachea appeared normal. Main faye was sharp. The bilateral lobar airways and segments were clear and patent. There were no visualized endobronchial lesions. Bronchial secretions were thin, mucoid and scant overall. Bronchial mucosa appeared erythematous.

## 2021-12-11 NOTE — BRIEF OPERATIVE NOTE - NSICDXBRIEFPREOP_GEN_ALL_CORE_FT
PRE-OP DIAGNOSIS:  Acute hypoxemic respiratory failure due to COVID-19 11-Dec-2021 16:08:04  Juan Manuel Hawkins R

## 2021-12-11 NOTE — PROGRESS NOTE ADULT - ASSESSMENT
86 y/o male with HTN, gout, hypothyroid, and previous TIA on xarelto, and with a recent diagnosis of Covid-19 on 11/29 who presented with acute altered mental status change, HD instability, and hypoxia 2/2 covid pneumonia with superimposed bacterial pneumonia, found to have b/l pulmonary consolidations on CT scan, s/p intubation, started on multiple pressors and antibiotics, patient now off pressors and more stable, pending improvement in MS for possible extubation.     NEURO  Encephalopathy in the setting of sepsis and hypoxia   Patient AOx0 in the ED, agitated acute mental status change per coworkers  Patient initially hypertensive and tachycardic but later became profoundly hypotensive   Presumed to be due to sepsis in the setting of covid pneumonia and possible super-imposed bacterial infection  was on several pressors as well as sedatives for several days, now off all pressors and on sedation with propofol   - Patient very alert and following commands today  - However appears uncomfortable, not ready to be extubated as he is pretty tachypneic on high pressure support  - Up on Fentanyl drip and propofol  - Patient made DNR/DN reintubate    #Prior TIA  Patient reportedly taking xarelto for this, discussed with his wife  - Will hold in case he may need procedure  - Also should consider substituting for ASA later      CARDS  #Shock 2/2 sepsis- Resolving   Meeting 4/4 SIRS criteria in the ED  Hypotensive with MAPS in 50s  Now on Levophed  - Will continue meropenam for 10 day course- need to reach out to ID about treatment course   - Reached out to vascular about digital necrosis- No acute vascular surgical intervention at this time as wounds are not infected patient is still intubated and actively with covid infection. Please reconsult once patient recovers and wants to pursue amputation per GOC  - Hand surgery consulted for digital necrosis of fingers   - Daily wound care with betadine to gangrenous fingers and toes, keep gangrenous areas dry  - Ongoing GOC with family, palliative following     #Ischemic limb necrosis   - Vascular consulted for LE ischemia  - Hand surgery consulted for UE digit necrosis  - Starting Heparin ggt today  - Palliative reached out to patients wife about this yesterday, she has not made any decisions in regards to what they would want done   - Wife does not want to pursue amputations at this time, if patient does not make meaningful recovery by Monday, family considering terminal wean     #HTN  Patients pressures increasing off pressors  Unable to take home ACE given MARISABEL  - Holding lisinopril    #HLD  - holding home statin in setting of transaminitis     #NSTEMI  Presented with elevated troponins, ST depressions in V2-V3- was not trending  Patient noted to have ST depressions last night- Trop T 1.36, CKMB 7  Downtrended in the AM    PULM  #AHRF in the setting of COVID PNA  CT scan with b/l consolidations  Hypoxic into the 70s, altered, meeting 4/4 sirs criteria  Intubated. Patient tested + for covid on 11/29 and apparently is vaccinated   LFTs elevated. Good UOP. Bx cultures neg. P/F ratios improved   - had been tolerating CPAP trials well, however overnight patient very tachypneic, belly breathing, uncomfortable appearing-possibly due to aggitation from inadequate sedaton vs worsening of pulmonary process  On CPAP all night- Still on high pressure support and PEEP, slightly less tachypneic than yesterday but appears uncomfortable  - Up on fentanyl   - Deproned  - sputum culture Neg  - Remdesivir held in setting of transaminitis   - Completed decadron  today  - now on polly    - s/p Toculizumab x1 on 12/1  - 40mg IV Lasix trial   - 12/11, increasing vent requirements - bedside bronch and visualization of ETT with glidescope unrevealing, CXR unchanged       #Tachypnea  Tachypneic and overbreathing vent overnight  May be pain/anxiety driven  - Requiring a lot of pressure support on CPAP  - fentanyl drip   - Not ready for extubation today     GI  # transaminitis   In the setting of sepsis  improving, initiation of remdesivir may be contributing  - hold remdesivir and statin   - ctm   - started with 5 Reglan TID    #Nutrition  - NGT w/tube feeds     RENAL  # MARISABEL most likely ATN from shock state- Resolved   Baseline of 1.1 and came up to 2.4, now close to back at baseline  A/w several electrolyte abnormalities - K 5.6, Phos 6.9 all resolved   - CTM    #Hypernatremia- Resolved    HEME  No active issues    Endo  #Hypothyroid  Takes home synthroid  - c/w this med     #Gout  - Hold allopurinol     ID  #Fever  Now afebrile on meropenam. Bronch cultures with mold growth.  - Continues to fever despite broad spectrum antibiotics  - Blood and sputum cultures negative   - Presumed to have bacterial superinfection over covid pneumpnia  - fungal cultures pending   - C/w meropenam    - Clarify with ID about length of duration    F: None   E: Replete as necessary K>4 Mg>2  N: Tube feeds   DVT Prophylaxis: Heparin drip  GI prophylaxis: IV PPI    CODE STATUS: DNR

## 2021-12-12 NOTE — PROGRESS NOTE ADULT - SUBJECTIVE AND OBJECTIVE BOX
OVERNIGHT EVENTS:    SUBJECTIVE:     VITAL SIGNS:  T(F): 102 (12-12-21 @ 01:13)  HR: 65 (12-12-21 @ 16:00)  BP: 105/51 (12-12-21 @ 16:00)  RR: 25 (12-12-21 @ 16:00)  SpO2: 98% (12-12-21 @ 16:00)  Wt(kg): --      12-11-21 @ 07:01  -  12-12-21 @ 07:00  --------------------------------------------------------  IN: 2174.9 mL / OUT: 3230 mL / NET: -1055.1 mL    12-12-21 @ 07:01  -  12-12-21 @ 17:06  --------------------------------------------------------  IN: 25 mL / OUT: 1325 mL / NET: -1300 mL    Drips:  - fentanyl 1  - propofol 40  - heparin  - levo .076     PHYSICAL EXAM:    Constitutional: Laying in bed, intubated, appears uncomfortable, belly breathing   Neurological: On sedation, however more alert today, opening his eyes, moving all 4 extremities, squeezing hand on command, shaking his head  HEENT:  neck supple, no masses, no JVD  Respiratory: Belly breathing, minor crackles bl, otherwise clear   Cardiovascular: RRR, normal S1S2, no murmurs or rubs   Gastrointestinal: +BS, not distended, not rigid  Extremities: Extremities are more warm distally in all 4 extremities, weak distal pulses  Skin: Patients feet, toes and fingers now appear mottled and dark, large open wound on the right forearm - worsening, new left forearm wound    MEDICATIONS  (STANDING):  albuterol/ipratropium for Nebulization 3 milliLiter(s) Nebulizer every 6 hours  chlorhexidine 0.12% Liquid 15 milliLiter(s) Oral Mucosa every 12 hours  chlorhexidine 2% Cloths 1 Application(s) Topical <User Schedule>  dexMEDEtomidine Infusion 0.2 MICROgram(s)/kG/Hr (4.23 mL/Hr) IV Continuous <Continuous>  dextrose 40% Gel 15 Gram(s) Oral once  dextrose 5%. 1000 milliLiter(s) (50 mL/Hr) IV Continuous <Continuous>  dextrose 5%. 1000 milliLiter(s) (100 mL/Hr) IV Continuous <Continuous>  dextrose 50% Injectable 25 Gram(s) IV Push once  dextrose 50% Injectable 12.5 Gram(s) IV Push once  dextrose 50% Injectable 25 Gram(s) IV Push once  fentaNYL   Infusion. 0.5 MICROgram(s)/kG/Hr (4.23 mL/Hr) IV Continuous <Continuous>  glucagon  Injectable 1 milliGRAM(s) IntraMuscular once  heparin  Infusion 1300 Unit(s)/Hr (13 mL/Hr) IV Continuous <Continuous>  insulin lispro (ADMELOG) corrective regimen sliding scale   SubCutaneous every 6 hours  levothyroxine Injectable 37.5 MICROGram(s) IV Push at bedtime  meropenem  IVPB 1000 milliGRAM(s) IV Intermittent every 8 hours  norepinephrine Infusion 0.02 MICROgram(s)/kG/Min (3.17 mL/Hr) IV Continuous <Continuous>  pantoprazole   Suspension 40 milliGRAM(s) Enteral Tube every 24 hours  propofol Infusion 5 MICROgram(s)/kG/Min (2.54 mL/Hr) IV Continuous <Continuous>  senna 2 Tablet(s) Oral at bedtime    MEDICATIONS  (PRN):  acetaminophen    Suspension .. 650 milliGRAM(s) Enteral Tube every 6 hours PRN Temp greater or equal to 38C (100.4F)    Allergies    Allergy Status Unknown    Intolerances    LABS:                        12.1   18.20 )-----------( 258      ( 12 Dec 2021 06:04 )             38.5     12-12    149<H>  |  114<H>  |  55<H>  ----------------------------<  115<H>  5.6<H>   |  27  |  1.04    Ca    8.5      12 Dec 2021 06:04  Phos  4.4     12-12  Mg     2.9     12-12    TPro  5.8<L>  /  Alb  2.7<L>  /  TBili  0.4  /  DBili  x   /  AST  174<H>  /  ALT  188<H>  /  AlkPhos  127<H>  12-12    PTT - ( 11 Dec 2021 07:26 )  PTT:88.5 sec    RADIOLOGY & ADDITIONAL TESTS:  Reviewed     OVERNIGHT EVENTS:  HARRY    SUBJECTIVE:   Intubated and sedated    VITAL SIGNS:  T(F): 102 (12-12-21 @ 01:13)  HR: 65 (12-12-21 @ 16:00)  BP: 105/51 (12-12-21 @ 16:00)  RR: 25 (12-12-21 @ 16:00)  SpO2: 98% (12-12-21 @ 16:00)  Wt(kg): --      12-11-21 @ 07:01  -  12-12-21 @ 07:00  --------------------------------------------------------  IN: 2174.9 mL / OUT: 3230 mL / NET: -1055.1 mL    12-12-21 @ 07:01  -  12-12-21 @ 17:06  --------------------------------------------------------  IN: 25 mL / OUT: 1325 mL / NET: -1300 mL    Drips:  - fentanyl 1  - propofol 40  - heparin  - levo .076     PHYSICAL EXAM:    Constitutional: Laying in bed, intubated, appears uncomfortable, belly breathing   Neurological: On sedation, however more alert today, opening his eyes, moving all 4 extremities, squeezing hand on command, shaking his head  HEENT:  neck supple, no masses, no JVD  Respiratory: Belly breathing, minor crackles bl, otherwise clear   Cardiovascular: RRR, normal S1S2, no murmurs or rubs   Gastrointestinal: +BS, not distended, not rigid  Extremities: Extremities are more warm distally in all 4 extremities, weak distal pulses  Skin: Patients feet, toes and fingers now appear mottled and dark, large open wound on the right forearm - worsening, new left forearm wound    MEDICATIONS  (STANDING):  albuterol/ipratropium for Nebulization 3 milliLiter(s) Nebulizer every 6 hours  chlorhexidine 0.12% Liquid 15 milliLiter(s) Oral Mucosa every 12 hours  chlorhexidine 2% Cloths 1 Application(s) Topical <User Schedule>  dexMEDEtomidine Infusion 0.2 MICROgram(s)/kG/Hr (4.23 mL/Hr) IV Continuous <Continuous>  dextrose 40% Gel 15 Gram(s) Oral once  dextrose 5%. 1000 milliLiter(s) (50 mL/Hr) IV Continuous <Continuous>  dextrose 5%. 1000 milliLiter(s) (100 mL/Hr) IV Continuous <Continuous>  dextrose 50% Injectable 25 Gram(s) IV Push once  dextrose 50% Injectable 12.5 Gram(s) IV Push once  dextrose 50% Injectable 25 Gram(s) IV Push once  fentaNYL   Infusion. 0.5 MICROgram(s)/kG/Hr (4.23 mL/Hr) IV Continuous <Continuous>  glucagon  Injectable 1 milliGRAM(s) IntraMuscular once  heparin  Infusion 1300 Unit(s)/Hr (13 mL/Hr) IV Continuous <Continuous>  insulin lispro (ADMELOG) corrective regimen sliding scale   SubCutaneous every 6 hours  levothyroxine Injectable 37.5 MICROGram(s) IV Push at bedtime  meropenem  IVPB 1000 milliGRAM(s) IV Intermittent every 8 hours  norepinephrine Infusion 0.02 MICROgram(s)/kG/Min (3.17 mL/Hr) IV Continuous <Continuous>  pantoprazole   Suspension 40 milliGRAM(s) Enteral Tube every 24 hours  propofol Infusion 5 MICROgram(s)/kG/Min (2.54 mL/Hr) IV Continuous <Continuous>  senna 2 Tablet(s) Oral at bedtime    MEDICATIONS  (PRN):  acetaminophen    Suspension .. 650 milliGRAM(s) Enteral Tube every 6 hours PRN Temp greater or equal to 38C (100.4F)    Allergies    Allergy Status Unknown    Intolerances    LABS:                        12.1   18.20 )-----------( 258      ( 12 Dec 2021 06:04 )             38.5     12-12    149<H>  |  114<H>  |  55<H>  ----------------------------<  115<H>  5.6<H>   |  27  |  1.04    Ca    8.5      12 Dec 2021 06:04  Phos  4.4     12-12  Mg     2.9     12-12    TPro  5.8<L>  /  Alb  2.7<L>  /  TBili  0.4  /  DBili  x   /  AST  174<H>  /  ALT  188<H>  /  AlkPhos  127<H>  12-12    PTT - ( 11 Dec 2021 07:26 )  PTT:88.5 sec    RADIOLOGY & ADDITIONAL TESTS:  Reviewed

## 2021-12-12 NOTE — PROGRESS NOTE ADULT - ASSESSMENT
88 y/o male with HTN, gout, hypothyroid, and previous TIA on xarelto, and with a recent diagnosis of Covid-19 on 11/29 who presented with acute altered mental status change, HD instability, and hypoxia 2/2 covid pneumonia with superimposed bacterial pneumonia, found to have b/l pulmonary consolidations on CT scan, s/p intubation, started on multiple pressors and antibiotics, patient now off pressors and more stable, pending improvement in MS for possible extubation.     NEURO  Encephalopathy in the setting of sepsis and hypoxia   Patient AOx0 in the ED, agitated acute mental status change per coworkers  Patient initially hypertensive and tachycardic but later became profoundly hypotensive   Presumed to be due to sepsis in the setting of covid pneumonia and possible super-imposed bacterial infection  was on several pressors as well as sedatives for several days, now off all pressors and on sedation with propofol   - Patient very alert and following commands today  - However appears uncomfortable, not ready to be extubated as he is pretty tachypneic on high pressure support  - Up on Fentanyl drip and propofol, titrate to CAROLYN-3 to -4 for comfort   - Patient made DNR/DN reintubate    #Prior TIA  Patient reportedly taking xarelto for this, discussed with his wife  - Will hold in case he may need procedure  - Also should consider substituting for ASA later    - heparin ggt     CARDS  #Shock 2/2 sepsis- Resolving   Meeting 4/4 SIRS criteria in the ED  Hypotensive with MAPS in 50s  Now on Levophed  - Will continue meropenam for 10 day course- need to reach out to ID about treatment course   - Reached out to vascular about digital necrosis- No acute vascular surgical intervention at this time as wounds are not infected patient is still intubated and actively with covid infection. Please reconsult once patient recovers and wants to pursue amputation per GOC  - Hand surgery consulted for digital necrosis of fingers, no current indication for amputation   - Daily wound care with betadine to gangrenous fingers and toes, keep gangrenous areas dry  - Ongoing GOC with family, palliative following   - Hypotensive again today, restarted on levo     #Ischemic limb necrosis   - Vascular consulted for LE ischemia  - Hand surgery consulted for UE digit necrosis  - Starting Heparin ggt today  - Palliative reached out to patients wife about this yesterday, she has not made any decisions in regards to what they would want done   - Wife does not want to pursue amputations at this time, if patient does not make meaningful recovery by Monday, family considering terminal wean     #HTN  Patients pressures increasing off pressors  Unable to take home ACE given MARISABEL  - Holding lisinopril    #HLD  - holding home statin in setting of transaminitis     #NSTEMI  Presented with elevated troponins, ST depressions in V2-V3- was not trending  Patient noted to have ST depressions last night- Trop T 1.36, CKMB 7  Downtrended in the AM    PULM  #AHRF in the setting of COVID PNA  CT scan with b/l consolidations  Hypoxic into the 70s, altered, meeting 4/4 sirs criteria  Intubated. Patient tested + for covid on 11/29 and apparently is vaccinated   LFTs elevated. Good UOP. Bx cultures neg. P/F ratios improved   - had been tolerating CPAP trials well, however overnight patient very tachypneic, belly breathing, uncomfortable appearing-possibly due to aggitation from inadequate sedaton vs worsening of pulmonary process  On CPAP all night- Still on high pressure support and PEEP, slightly less tachypneic than yesterday but appears uncomfortable  - Up on fentanyl   - Deproned  - sputum culture Neg  - Remdesivir held in setting of transaminitis   - now on polly    - s/p Toculizumab x1 on 12/1  - 40mg IV Lasix trial   - 12/11, increasing vent requirements - bedside bronch and visualization of ETT with glidescope unrevealing, CXR unchanged       #Tachypnea  Tachypneic and overbreathing vent overnight  May be pain/anxiety driven  - Requiring a lot of pressure support on CPAP  - fentanyl drip   - Not ready for extubation today     GI  # transaminitis   In the setting of sepsis  improving, initiation of remdesivir may be contributing  - hold remdesivir and statin   - ctm   - started with 5 Reglan TID    #Nutrition  - NGT w/tube feeds     RENAL  # MARISABEL most likely ATN from shock state- Resolved   Baseline of 1.1 and came up to 2.4, now close to back at baseline  A/w several electrolyte abnormalities - K 5.6, Phos 6.9 all resolved   - CTM    #Hypernatremia- worsening again  - ctm     HEME  No active issues    Endo  #Hypothyroid  Takes home synthroid  - c/w this med     #Gout  - Hold allopurinol     ID  #Fever  Now afebrile on meropenam. Bronch cultures with mold growth.  - Continues to fever despite broad spectrum antibiotics  - recultured 12/12  - Blood and sputum cultures negative   - Presumed to have bacterial superinfection over covid pneumpnia  - fungal cultures pending   - C/w meropenam    - Clarify with ID about length of duration    F: None   E: Replete as necessary K>4 Mg>2  N: Tube feeds   DVT Prophylaxis: Heparin drip  GI prophylaxis: IV PPI    CODE STATUS: DNR

## 2021-12-13 NOTE — PROGRESS NOTE ADULT - ASSESSMENT
86 y/o male with HTN, gout, hypothyroid, and previous TIA on xarelto, and with a recent diagnosis of Covid-19 on 11/29 who presented with acute altered mental status change, HD instability, and hypoxia 2/2 covid pneumonia with superimposed bacterial pneumonia, found to have b/l pulmonary consolidations on CT scan, s/p intubation, started on multiple pressors and antibiotics, patient now off pressors and more stable, pending improvement in MS for possible extubation.     NEURO  Encephalopathy in the setting of sepsis and hypoxia   Patient AOx0 in the ED, agitated acute mental status change per coworkers  Patient initially hypertensive and tachycardic but later became profoundly hypotensive   Presumed to be due to sepsis in the setting of covid pneumonia and possible super-imposed bacterial infection  was on several pressors as well as sedatives for several days, now off all pressors and on sedation with propofol   - Up on Fentanyl drip and propofol, titrate to CAROLYN-3 to -4 for comfort   - Patient made DNR/DN reintubate  - Plan per wife is to palliatively extubate the patient tomorrow     #Prior TIA  Patient reportedly taking xarelto for this, discussed with his wife  - Will hold in case he may need procedure  - Also should consider substituting for ASA later    - heparin ggt- will dc    CARDS  #Shock 2/2 sepsis  Meeting 4/4 SIRS criteria in the ED  Hypotensive with MAPS in 50s  Now on Levophed  - Will continue meropenam for 10 day course- need to reach out to ID about treatment course   - Reached out to vascular about digital necrosis- No acute vascular surgical intervention at this time as wounds are not infected patient is still intubated and actively with covid infection. Please reconsult once patient recovers and wants to pursue amputation per GOC  - Hand surgery consulted for digital necrosis of fingers, no current indication for amputation   - Daily wound care with betadine to gangrenous fingers and toes, keep gangrenous areas dry  - Ongoing GOC with family, palliative following   - Hypotensive again today, restarted on levo     #Ischemic limb necrosis   - Vascular consulted for LE ischemia  - Hand surgery consulted for UE digit necrosis  - Palliative reached out to patients wife about this yesterday, she has not made any decisions in regards to what they would want done   - Wife does not want to pursue amputations at this time, if patient does not make meaningful recovery by Monday, family considering terminal wean     #HTN  Patients pressures increasing off pressors  Unable to take home ACE given MARISABEL  - Holding lisinopril    #HLD  - holding home statin in setting of transaminitis     #NSTEMI  Presented with elevated troponins, ST depressions in V2-V3- was not trending  Patient noted to have ST depressions last night- Trop T 1.36, CKMB 7  Downtrended in the AM    PULM  #AHRF in the setting of COVID PNA- worsening   CT scan with b/l consolidations  Hypoxic into the 70s, altered, meeting 4/4 sirs criteria  Intubated. Patient tested + for covid on 11/29 and apparently is vaccinated   LFTs elevated. Good UOP. Bx cultures neg. P/F ratios improved   - had been tolerating CPAP trials well, however overnight patient very tachypneic, belly breathing, uncomfortable appearing-possibly due to aggitation from inadequate sedaton vs worsening of pulmonary process  On CPAP all night- Still on high pressure support and PEEP, slightly less tachypneic than yesterday but appears uncomfortable  - Up on fentanyl   - Deproned  - sputum culture Neg  - Remdesivir held in setting of transaminitis   - now on polly    - s/p Toculizumab x1 on 12/1  - 40mg IV Lasix trial   - 12/11, increasing vent requirements - bedside bronch and visualization of ETT with glidescope unrevealing, CXR unchanged     - continues to be uncomfortable on the vent    #Tachypnea  Tachypneic and overbreathing vent overnight  May be pain/anxiety driven  - Requiring a lot of pressure support on CPAP  - fentanyl drip   - Not ready for extubation    GI  # transaminitis   In the setting of sepsis  improving, initiation of remdesivir may be contributing  - hold remdesivir and statin   - ctm   - started with 5 Reglan TID    #Nutrition  - NGT w/tube feeds     RENAL  # MARISABEL most likely ATN from shock state- Resolved   Baseline of 1.1 and came up to 2.4, now close to back at baseline  A/w several electrolyte abnormalities - K 5.6, Phos 6.9 all resolved   - CTM    #Hypernatremia- worsening again  - ctm     HEME  No active issues    Endo  #Hypothyroid  Takes home synthroid  - c/w this med     #Gout  - Hold allopurinol     ID  #Fever  Now afebrile on meropenam. Bronch cultures with mold growth.  - Continues to fever despite broad spectrum antibiotics  - Blood and sputum cultures negative   - C/w meropenam    - Clarify with ID about length of duration    F: None   E: Replete as necessary K>4 Mg>2  N: Tube feeds   DVT Prophylaxis: DC  GI prophylaxis: IV PPI    CODE STATUS: DNR 88 y/o male with HTN, gout, hypothyroid, and previous TIA on xarelto, and with a recent diagnosis of Covid-19 on 11/29 who presented with acute altered mental status change, HD instability, and hypoxia 2/2 covid pneumonia with superimposed bacterial pneumonia, found to have b/l pulmonary consolidations on CT scan, s/p intubation, started on multiple pressors and antibiotics, patient now off pressors and more stable, pending improvement in MS for possible extubation.     NEURO  Encephalopathy in the setting of sepsis and hypoxia   Patient AOx0 in the ED, agitated acute mental status change per coworkers  Patient initially hypertensive and tachycardic but later became profoundly hypotensive   Presumed to be due to sepsis in the setting of covid pneumonia and possible super-imposed bacterial infection  was on several pressors as well as sedatives for several days, now off all pressors and on sedation with propofol   - Up on Fentanyl drip and propofol, titrate to CAROLYN-3 to -4 for comfort   - Patient made DNR/DN reintubate  - Plan per wife is to palliatively extubate the patient tomorrow   -Palliative following   -recommend Fentanyl 50mcg IV q1h PRN for Pain and/or RR >25  -recommend Ativan 1mg IV q2h PRN for Anxiety/Agitation  -recommend Robinul 0.4mg IV q4h PRN for Excessive Secretions  -consider placing Scopolamine Patch today.    #Prior TIA  Patient reportedly taking xarelto for this, discussed with his wife  - Will hold in case he may need procedure  - Also should consider substituting for ASA later    - heparin ggt- will dc    CARDS  #Shock 2/2 sepsis  Meeting 4/4 SIRS criteria in the ED  Hypotensive with MAPS in 50s  Now on Levophed  - DC meropenam   - Reached out to vascular about digital necrosis- No acute vascular surgical intervention at this time as wounds are not infected patient is still intubated and actively with covid infection. Please reconsult once patient recovers and wants to pursue amputation per GOC  - Hand surgery consulted for digital necrosis of fingers, no current indication for amputation   - Daily wound care with betadine to gangrenous fingers and toes, keep gangrenous areas dry  - Ongoing GOC with family, palliative following   - Hypotensive again today, restarted on levo     #Ischemic limb necrosis   - Vascular consulted for LE ischemia  - Hand surgery consulted for UE digit necrosis  - Palliative reached out to patients wife about this yesterday, she has not made any decisions in regards to what they would want done   - Wife does not want to pursue amputations at this time, if patient does not make meaningful recovery by Monday, family considering terminal wean     #HTN  Patients pressures increasing off pressors  Unable to take home ACE given MARISABEL  - Holding lisinopril    #HLD  - holding home statin in setting of transaminitis     #NSTEMI  Presented with elevated troponins, ST depressions in V2-V3- was not trending  Patient noted to have ST depressions last night- Trop T 1.36, CKMB 7  Downtrended in the AM    PULM  #AHRF in the setting of COVID PNA- worsening   CT scan with b/l consolidations  Hypoxic into the 70s, altered, meeting 4/4 sirs criteria  Intubated. Patient tested + for covid on 11/29 and apparently is vaccinated   LFTs elevated. Good UOP. Bx cultures neg. P/F ratios improved   - had been tolerating CPAP trials well, however overnight patient very tachypneic, belly breathing, uncomfortable appearing-possibly due to aggitation from inadequate sedaton vs worsening of pulmonary process  On CPAP all night- Still on high pressure support and PEEP, slightly less tachypneic than yesterday but appears uncomfortable  - Up on fentanyl   - Deproned  - sputum culture Neg  - Remdesivir held in setting of transaminitis   - now on polly    - s/p Toculizumab x1 on 12/1  - 40mg IV Lasix trial   - 12/11, increasing vent requirements - bedside bronch and visualization of ETT with glidescope unrevealing, CXR unchanged     - continues to be uncomfortable on the vent    #Tachypnea  Tachypneic and overbreathing vent overnight  May be pain/anxiety driven  - Requiring a lot of pressure support on CPAP  - fentanyl drip   - Not ready for extubation    GI  # transaminitis   In the setting of sepsis  improving, initiation of remdesivir may be contributing  - hold remdesivir and statin   - ctm   - started with 5 Reglan TID    #Nutrition  -Holding tube feeds    RENAL  # MARISABEL most likely ATN from shock state- Resolved   Baseline of 1.1 and came up to 2.4, now close to back at baseline  A/w several electrolyte abnormalities - K 5.6, Phos 6.9 all resolved   - CTM    #Hypernatremia- worsening again  - ctm     HEME  No active issues    Endo  #Hypothyroid  Takes home synthroid  - c/w this med     #Gout  - Hold allopurinol     ID  #Fever  Now afebrile on meropenam. Bronch cultures with mold growth.  - Continues to fever despite broad spectrum antibiotics  - Blood and sputum cultures negative   - C/w meropenam    - Clarify with ID about length of duration    F: None   E: Replete as necessary K>4 Mg>2  N: NPO  DVT Prophylaxis: DC  GI prophylaxis: IV PPI    CODE STATUS: DNR

## 2021-12-13 NOTE — PROGRESS NOTE ADULT - TREATMENT GUIDELINES
Do Not Re-Intubate/DNR Order
DNR Order/Comfort measures only/Do not re-hospitalize/No blood draws/No artificial nutrition/No antibiotics

## 2021-12-13 NOTE — PROGRESS NOTE ADULT - CONVERSATION DETAILS
Met with wife to review hospital course and expected disease trajectory. She is clear that patient would not accept trach/PEG or amputations. She wishes to proceed with compassionate extubation but will wait until tomorrow so a family member can travel to [facility] and support her. She wishes that the patient's comfort be prioritized over all other things. She is ok with stopping Abx, FS, unnecessary medications, feeds, etc. No interventions that do not contribute to patient's comfort. Met with wife to review hospital course and expected disease trajectory. She is clear that patient would not accept trach/PEG or amputations. She wishes to proceed with compassionate extubation but will wait until tomorrow so a family member can travel to [facility] and support her. She wishes that the patient's comfort be prioritized over all other things. She is ok with stopping Abx, FS, unnecessary medications, feeds, etc. No interventions that do not contribute to patient's comfort. Introduced the potential need for inpatient hospice transfer if the patient persists after extubation. Expect patient to be on a hours-days timeline after extubation.

## 2021-12-13 NOTE — PROGRESS NOTE ADULT - ASSESSMENT
86yo M with PMH of HTN, Hypothyroidism, on Anticoagulation p/w respiratory failure and found to have COVID. Palliative consulted for complex medical decision making in the setting of critical illness / ICU trigger.    ·	transition to symptom-directed/comfort care; plan for compassionate extubation tomorrow afternoon  -discontinue FS/Insulin, Abx, Lab work, TF -> maintain adequate sedation for comfort  -recommend Fentanyl 50mcg IV q1h PRN for Pain and/or RR >25  -recommend Ativan 1mg IV q2h PRN for Anxiety/Agitation  -recommend Robinul 0.4mg IV q4h PRN for Excessive Secretions    ·	Pre-Extubation recommendations in anticipation of compassionate extubation tomorrow:  -stop tube feeds to prevent aspiration; pull OGT when extubated  -consider placing Scopolamine Patch today  -discontinue pressors after pre-medication immediately before extubation

## 2021-12-13 NOTE — PROGRESS NOTE ADULT - SUBJECTIVE AND OBJECTIVE BOX
OVERNIGHT EVENTS: HARRY    SUBJECTIVE: Intubated and sedated    VITAL SIGNS:  T(F): 99.5 (12-13-21 @ 10:00)  HR: 74 (12-13-21 @ 12:00)  BP: 112/56 (12-13-21 @ 12:00)  RR: 25 (12-13-21 @ 12:00)  SpO2: 98% (12-13-21 @ 12:00)    12-12-21 @ 07:01  -  12-13-21 @ 07:00  --------------------------------------------------------  IN: 1960.8 mL / OUT: 2135 mL / NET: -174.2 mL    12-13-21 @ 07:01  -  12-13-21 @ 13:11  --------------------------------------------------------  IN: 320.4 mL / OUT: 125 mL / NET: 195.4 mL    Drips:  - Fentanyl  - Levo  - Propofol     PHYSICAL EXAM:    Constitutional: Laying in bed, intubated, heavily sedated  Neurological: On sedation,  HEENT:  neck supple, no masses, no JVD  Respiratory: Belly breathing, minor crackles bl, otherwise clear   Cardiovascular: RRR, normal S1S2, no murmurs or rubs   Gastrointestinal: +BS, not distended, not rigid  Extremities: Extremities are more warm distally in all 4 extremities, weak distal pulses  Skin: Patients feet, toes and fingers now appear mottled and dark, large open wound on the right forearm - worsening, new left forearm wound    MEDICATIONS  (STANDING):  albuterol/ipratropium for Nebulization 3 milliLiter(s) Nebulizer every 6 hours  chlorhexidine 0.12% Liquid 15 milliLiter(s) Oral Mucosa every 12 hours  chlorhexidine 2% Cloths 1 Application(s) Topical <User Schedule>  dexMEDEtomidine Infusion 0.2 MICROgram(s)/kG/Hr (4.23 mL/Hr) IV Continuous <Continuous>  dextrose 40% Gel 15 Gram(s) Oral once  dextrose 5%. 1000 milliLiter(s) (50 mL/Hr) IV Continuous <Continuous>  dextrose 5%. 1000 milliLiter(s) (100 mL/Hr) IV Continuous <Continuous>  dextrose 50% Injectable 25 Gram(s) IV Push once  dextrose 50% Injectable 12.5 Gram(s) IV Push once  dextrose 50% Injectable 25 Gram(s) IV Push once  fentaNYL   Infusion. 0.5 MICROgram(s)/kG/Hr (4.23 mL/Hr) IV Continuous <Continuous>  glucagon  Injectable 1 milliGRAM(s) IntraMuscular once  heparin  Infusion 1300 Unit(s)/Hr (13 mL/Hr) IV Continuous <Continuous>  insulin lispro (ADMELOG) corrective regimen sliding scale   SubCutaneous every 6 hours  levothyroxine Injectable 37.5 MICROGram(s) IV Push at bedtime  meropenem  IVPB 1000 milliGRAM(s) IV Intermittent every 8 hours  norepinephrine Infusion 0.02 MICROgram(s)/kG/Min (3.17 mL/Hr) IV Continuous <Continuous>  pantoprazole   Suspension 40 milliGRAM(s) Enteral Tube every 24 hours  propofol Infusion 5 MICROgram(s)/kG/Min (2.54 mL/Hr) IV Continuous <Continuous>  senna 2 Tablet(s) Oral at bedtime    MEDICATIONS  (PRN):  acetaminophen    Suspension .. 650 milliGRAM(s) Enteral Tube every 6 hours PRN Temp greater or equal to 38C (100.4F)    Allergies    Allergy Status Unknown    Intolerances    LABS:                        10.7   13.78 )-----------( 235      ( 13 Dec 2021 04:22 )             34.8     12-13    150<H>  |  115<H>  |  60<H>  ----------------------------<  156<H>  5.1   |  28  |  1.06    Ca    8.3<L>      13 Dec 2021 04:22  Phos  5.0     12-13  Mg     2.8     12-13    TPro  5.1<L>  /  Alb  2.5<L>  /  TBili  0.4  /  DBili  x   /  AST  128<H>  /  ALT  151<H>  /  AlkPhos  109  12-13      RADIOLOGY & ADDITIONAL TESTS:  Reviewed    MICRO:     Culture - Blood (collected 12-12-21 @ 18:19)  Source: .Blood Blood  Preliminary Report (12-13-21 @ 07:00):    No growth at 12 hours

## 2021-12-13 NOTE — PROGRESS NOTE ADULT - ATTENDING COMMENTS
86 yo M w/ septic shock and AHRF 2/2 COVID complicated by superimposed pneumonia, remains off pressors but was spiking high fevers despite vanc/zosyn. Unable to tolerate SAT/SBT due to hypoxia and tachypnea concerning for ongoing infection. Escalated to meropenem and fevers have improved, although cultures still with NGTD. Continues to have extensive necrosis of fingers/toes but appears to be dry gangrene, less likely with active infection. Tachypnea on CPAP but may be pain driven, will optimize fentanyl gtt and attept to wean sedation. Still requiring CPAP 10/8 FiO2 60% not ready for extubation. Required levo overnight, more likely related to sedation, now off pressors again. Hold antihypertensives.  Per vascular, will ultimately need amputation, recommends hep gtt for now. Hypernatremia continued to improved, now off D5W, remains significantly volume up (+3L in last 24 hrs), attempt to diurese.   GoC discussions ongoing, DNR and likely will not pursue trach/peg given living will that stated if he was not to return to good functional status he would not want aggressive treatment. Family would also not want to pursue amputation. Likely will need palliative wean.
86 yo M w/ septic shock and AHRF 2/2 COVID complicated by superimposed pneumonia, remains off pressors but was spiking high fevers despite vanc/zosyn. Unable to tolerate SAT/SBT due to hypoxia and tachypnea concerning for ongoing infection. Escalated to meropenem and fevers have improved, although cultures still with NGTD. Continues to have extensive necrosis of fingers/toes but appears to be dry gangrene, less likely with active infection. GoC discussions ongoing, DNR and likely will not pursue trach/peg given living will that stated if he was not to return to good functional status he would not want aggressive treatment.     Acute worsening of hypoxia and respiratory failure yesterday, unclear etiology but required escalation of FiO2, ventilatory support, and sedation. Family meeting to occur on Monday, but per discussion over phone with wife yesterday after event she requests that we just ensure he is "comfortable". Focus will be on symptom management as further aggressive work up and treatment is unlikely to change outcome.
86 yo M w/ septic shock and AHRF 2/2 COVID complicated by superimposed pneumonia, remains off pressors but was spiking high fevers despite vanc/zosyn. Unable to tolerate SAT/SBT due to hypoxia and tachypnea concerning for ongoing infection. Escalated to meropenem and fevers have improved, although cultures still with NGTD. Continues to have extensive necrosis of fingers/toes but appears to be dry gangrene, less likely with active infection. GoC discussions ongoing, DNR and likely will not pursue trach/peg given living will that stated if he was not to return to good functional status he would not want aggressive treatment.     This morning had been tolerating CPAP trial although mildly tachypnic, able to answer basic questions with nodding of head. Began having more SOB and after being turned in bed was noted to be in significant distress, paradoxical abdominal breathing on ventilator, desaturating to low 80s, hypertensive. POCUS with diffuse B line pattern and + lung sliding. Unable to assess airway pressures due to pt distress and "sucking" of air, required sedation with propofol and fentanyl and ultimately paralysis. Glidescope used to assess that ETT had not been dislodged into esophagus, but ETT noted to be passing through vocal cords. He remained hypoxic and PIP noted to be 35 with plateau pressure of 27, no audible wheezing. Able to pass suction catheter with no difficulty. Was noted to have increased residuals via NGT, bronchoscopy performed at bedside to assess for aspiration, but airways noted to be clean. Despite above interventions, remained severely hypoxic and requiring 100% FiO2. Possible flash pulmonary edema as pt was initially hypertensive? But he had diuresed 800cc this AM prior to episode and would usually respond to increasing PEEP for which he did not. Will continue to have GoC discussion as his respiratory failure continues to worsen.
Acute hypoxemic respiratory failure 2/2 COVID 19 pneumonia in severe ARDS with shock. S/p rem/toci; finish dex course. Patient vaccinated with Pfizer x 2.  Prone ventilation started on 12/1/2021 to 12/4; low tidal volumes; P/F >200 when supine. Broad spectrum abx. Omicron sequencing pending. Oliguric renal failure improving. Off sedation without mental status; if persists will need imaging.
86 yo M w/ septic shock and AHRF 2/2 COVID complicated by superimposed pneumonia, remains off pressors but was spiking high fevers despite vanc/zosyn. Unable to tolerate SAT/SBT due to hypoxia and tachypnea concerning for ongoing infection. Escalated to meropenem and fevers have improved, although cultures still with NGTD. Continues to have extensive necrosis of fingers/toes but appears to be dry gangrene, less likely with active infection. Per vascular, will ultimately need amputation, recommends hep gtt for now. Hypernatremia improved on FW + D5W, will decrease FW via PEG and trend. GoC discussions ongoing, DNR and likely will not pursue trach/peg given living will that stated if he was not to return to good functional status he would not want aggressive treatment. Seems discomfort is driving much of his tachypnea, will try fentanyl gtt for comfort and try to wean sedation.
88 yo M w/ septic shock and AHRF 2/2 COVID complicated by superimposed pneumonia, now off pressors. Wean sedation and assess mental status, currently tolerating CPAP but minimally alert (opening eyes with noxious stimuli), not following commands. May be worsened by hypernatremia. Continue with SAT/SBT trials, increase free water. Persistent urinary retention with >500cc after straight cath x 2, will place karimi. Recently found living will that stated if he was not to return to good functional status he would not want aggressive treatment, will discuss new living will finding with wife. If unable to be extubated would not pursue trach/PEG. BP labile, continue with home lisinopril and d/c carvedilol as this was not a home medication. To complete 10 day course of zosyn for superimposed bacterial infection in setting of immunosuppression from steroids + toci. Was previously on xarelto for TIA? No hx of AF, no evidence of AF on telemetry, continue to hold anticoagulation and c/w hep SQ ppx.
88 yo M w/ septic shock and AHRF 2/2 COVID complicated by superimposed pneumonia, remains off pressors but spiking high fevers despite vanc/zosyn. Unable to tolerate SAT/SBT due to hypoxia and tachypnea concerning for ongoing infection. Bedside POCUS with diffuse B line pattern, minimal effusions, no obvious consolidation or abscess formation. Fever may also be related to extensive necrosis of fingers/toes but appears to be dry gangrene, less likely with active infection. Will re-culture sputum, obtain UA, F/U BCx, and escalate to meropenem for now. Consult vascular surgery to assess limb necrosis. Persistently hypernatremic, will c/w FW via NGT and add D5W with close monitoring of FSG, may require basal insulin. Recently found living will that stated if he was not to return to good functional status he would not want aggressive treatment, will discuss new living will finding with wife. If unable to be extubated would not pursue trach/PEG. BP labile, had restarted lisinopril but in setting of fever and borderline hypotension will d/c. Noted to have ST depressions on monitor, though not noted on full 12 lead EKG. Troponin elevated overnight, had been elevated on admission and unclear when peaked, but currently now down trending. Hemodynamically stable, likely NSTEMI in setting of severe infection, will ultimately need cardiology consultation, but will address current sepsis and GoC for now.
Acute hypoxemic respiratory failure 2/2 COVID 19 pneumonia in severe ARDS with shock. S/p rem/toci; finish dex course. Patient vaccinated with Pfizer x 2.  Prone ventilation started on 12/1/2021 to 12/4; low tidal volumes; P/F >200 when supine. Broad spectrum abx. Omicron sequencing pending. Oliguric renal failure; monitoring for now.
Severe hypoxic respiratory failure 2/2 COVID pna unable to be weaned from ventilator with associated limb necrosis which would require amputation. Given patient's documented living will stating he would not want to be on a ventilator without meaningful recovery, family meeting today for likely de escalation of care. Ensure patient is comfortable.
Acute hypoxemic respiratory failure 2/2 COVID 19 pneumonia in severe ARDS with shock. Patient vaccinated with Pfizer x 2.  C/w prone ventilation started on 12/1/2021; low tidal volumes. Broad spectrum abx. Cultures negative to date. Dex/rem/toci. Will send for Omicron sequencing. Mildly improved.
Acute hypoxemic respiratory failure 2/2 COVID 19 pneumonia in severe ARDS with shock. Patient vaccinated with Pfizer x 2.  C/w prone ventilation started on 12/1/2021; low tidal volumes. Broad spectrum abx. Cultures negative to date. Dex/rem/toci. Will send for Omicron sequencing. P/F ratio improving daily. Oliguric renal failure; monitoring for now.
Acute hypoxemic respiratory failure 2/2 COVID 19 pneumonia in severe ARDS with shock. S/p rem/toci; finished dex course. Patient vaccinated with Pfizer x 2.  Prone ventilation started on 12/1/2021 to 12/4; low tidal volumes; P/F >200 when supine. Broad spectrum abx. Omicron sequencing pending. Oliguric renal failure improving. Off sedation without mental status; if persists will need imaging [facility] since history of stroke.

## 2021-12-13 NOTE — PROGRESS NOTE ADULT - ATTENDING SUPERVISION STATEMENT
Resident
Resident/Fellow
Resident
Resident/Fellow
Resident
Resident
Resident/Fellow
Resident/Fellow
Resident
Resident/Fellow

## 2021-12-13 NOTE — PROGRESS NOTE ADULT - CONVERSATION/DISCUSSION
Prognosis/Treatment Options/Hospice Referral
DISPLAY PLAN FREE TEXT
Diagnosis/Prognosis/MOLST Discussed/Treatment Options

## 2021-12-13 NOTE — PROGRESS NOTE ADULT - SUBJECTIVE AND OBJECTIVE BOX
[facility] and Palliative [facility]  Chirag Finley, Palliative Care Attending  Contact Info: Call [phone] (HEAL Line) or message on Microsoft Teams (Chirag Finley)    SUBJECTIVE AND OBJECTIVE:  INTERVAL HPI/OVERNIGHT EVENTS: Interval events noted. Patient remains intubated and sedated. Patient received no PRNs in past 24hrs. Now comfortably sedated. Met with patient's to discuss next steps, ultimately the plan is for compassionate extubation tomorrow and otherwise pursue fully symptom-directed care.    ALLERGIES:  Allergy Status Unknown    MEDICATIONS  (STANDING):  albuterol/ipratropium for Nebulization 3 milliLiter(s) Nebulizer every 6 hours  chlorhexidine 0.12% Liquid 15 milliLiter(s) Oral Mucosa every 12 hours  chlorhexidine 2% Cloths 1 Application(s) Topical <User Schedule>  dexMEDEtomidine Infusion 0.2 MICROgram(s)/kG/Hr (4.23 mL/Hr) IV Continuous <Continuous>  fentaNYL   Infusion. 0.5 MICROgram(s)/kG/Hr (4.23 mL/Hr) IV Continuous <Continuous>  glucagon  Injectable 1 milliGRAM(s) IntraMuscular once  insulin lispro (ADMELOG) corrective regimen sliding scale   SubCutaneous every 6 hours  levothyroxine Injectable 37.5 MICROGram(s) IV Push at bedtime  meropenem  IVPB 1000 milliGRAM(s) IV Intermittent every 8 hours  norepinephrine Infusion 0.02 MICROgram(s)/kG/Min (3.17 mL/Hr) IV Continuous <Continuous>  pantoprazole   Suspension 40 milliGRAM(s) Enteral Tube every 24 hours  propofol Infusion 5 MICROgram(s)/kG/Min (2.54 mL/Hr) IV Continuous <Continuous>  senna 2 Tablet(s) Oral at bedtime    MEDICATIONS  (PRN):  acetaminophen    Suspension .. 650 milliGRAM(s) Enteral Tube every 6 hours PRN Temp greater or equal to 38C (100.4F)    ITEMS UNCHECKED ARE NOT PRESENT    PRESENT SYMPTOMS: [x]Unable to obtain due to poor mentation   Source if other than patient:  []Family   []Team     PAIN AD Score: 0  [email or web address] (press ctrl +  left click to view)    Dyspnea:                           []Mild  []Moderate []Severe  Anxiety:                             []Mild []Moderate []Severe  Fatigue:                             []Mild []Moderate []Severe  Nausea:                             []Mild []Moderate []Severe  Loss of Appetite:              []Mild []Moderate []Severe  Constipation:                    []Mild []Moderate []Severe    Other Symptoms:  [x]All Other Review Of Systems Negative     Palliative Performance Status Version 2:  10%    [email or web address]    PHYSICAL EXAM:  GENERAL:  []Alert  []Oriented x   []Lethargic  []Cachexia  [x]Unarousable  []Verbal  [x]Non-Verbal  Behavioral:   []Anxiety  [x]Delirium []Agitation []Cooperative  HEENT:  []Normal   []Dry mouth   [x]ET Tube/Trach  []Oral lesions  PULMONARY:   []Clear [x]Tachypnea  []Audible excessive secretions   [x]Rhonchi        []Right []Left [x]Bilateral  []Crackles        []Right []Left []Bilateral  []Wheezing     []Right []Left []Bilateral  CARDIOVASCULAR:    []Regular []Irregular [x]Tachy  []Jony []Murmur []Other  GASTROINTESTINAL:  [x]Soft  [x]Distended   [x]+BS  []Non tender []Tender  []PEG [x]OGT/ NGT  Last BM:   GENITOURINARY:  []Normal [] Incontinent   []Oliguria/Anuria   [x]Mitchell  MUSCULOSKELETAL:   []Normal   []Weakness  [x]Bed/Wheelchair bound []Edema  NEUROLOGIC:   []No focal deficits  []Cognitive impairment  []Dysphagia []Dysarthria []Paresis [x]Encephalopathic   SKIN:   []Normal   []Pressure ulcer(s)  [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    CRITICAL CARE:  [x]Shock Present  [x]Septic [ ]Cardiogenic [ ]Neurologic [ ]Hypovolemic  [x]Vasopressors [ ]Inotropes   [x]Respiratory failure present [x]Mechanical Ventilation [ ]Non-invasive ventilatory support [ ]High-Flow  [x]Acute  [ ]Chronic [x]Hypoxic  [ ]Hypercarbic  [ ]Other organ failure    Vital Signs Last 24 Hrs  T(C): 37.5 (13 Dec 2021 10:00), Max: 37.6 (13 Dec 2021 06:30)  T(F): 99.5 (13 Dec 2021 10:00), Max: 99.7 (13 Dec 2021 06:30)  HR: 77 (13 Dec 2021 14:00) (64 - 82)  BP: 103/54 (13 Dec 2021 14:00) (92/51 - 116/57)  BP(mean): 77 (13 Dec 2021 14:00) (65 - 82)  RR: 25 (13 Dec 2021 14:00) (16 - 28)  SpO2: 98% (13 Dec 2021 14:00) (93% - 99%) I&O's Summary    12 Dec 2021 07:01  -  13 Dec 2021 07:00  --------------------------------------------------------  IN: 1960.8 mL / OUT: 2135 mL / NET: -174.2 mL    13 Dec 2021 07:01  -  13 Dec 2021 14:28  --------------------------------------------------------  IN: 572.6 mL / OUT: 375 mL / NET: 197.6 mL    LABS:              10.7   13.78 )-----------( 235      ( 13 Dec 2021 04:22 )             34.8   12-13    150<H>  |  115<H>  |  60<H>  ----------------------------<  156<H>  5.1   |  28  |  1.06    Ca    8.3<L>      13 Dec 2021 04:22  Phos  5.0     12-13  Mg     2.8     12-13  TPro  5.1<L>  /  Alb  2.5<L>  /  TBili  0.4  /  DBili  x   /  AST  128<H>  /  ALT  151<H>  /  AlkPhos  109  12-13    RADIOLOGY & ADDITIONAL STUDIES: None new    REFERRALS:  [x]Social Work  []Case management []PT/OT []Chaplaincy  []Hospice  []Patient/Family Support    DISCUSSION OF CASE: Fredi Schreiber - to provide updates and emotional support    Goals of Care Document:   Care Coordination Document:     PROGRESS NOTE  Date & Time of Note   2021-12-13 14:07  Notes: Case discussed in IDR. Per MICU team, patient for possible extubation  later this afternoon. Anticipated dispo plan: pending hospital course and  progress. CM remains available.  Electronically signed by:  Leticia Mims  Electronically signed on:  2021-12-13  14:09

## 2021-12-14 NOTE — PROGRESS NOTE ADULT - ASSESSMENT
86yo M with PMH of HTN, Hypothyroidism, on Anticoagulation p/w respiratory failure and found to have COVID. Palliative consulted for complex medical decision making in the setting of critical illness / ICU trigger.    ·	at time of extubation: discontinue Propofol and Pressors  ·	c/w Fentanyl gtt, patient with intact respiratory drive  ·	Fentanyl 50mcg IV q1h PRN for Pain  ·	Fentanyl 50mcg IV q1h PRN for RR >25  ·	Robinul 0.4mg IV q4h PRN for Excessive Secretions

## 2021-12-14 NOTE — PROGRESS NOTE ADULT - ASSESSMENT
88 y/o male with HTN, gout, hypothyroid, and previous TIA on xarelto, and with a recent diagnosis of Covid-19 on 11/29 who presented with acute altered mental status change, HD instability, and hypoxia 2/2 covid pneumonia with superimposed bacterial pneumonia, found to have b/l pulmonary consolidations on CT scan, s/p intubation, started on multiple pressors and antibiotics, patient now off pressors and more stable, pending improvement in MS for possible extubation.     NEURO  Encephalopathy in the setting of sepsis and hypoxia   Patient AOx0 in the ED, agitated acute mental status change per coworkers  Patient initially hypertensive and tachycardic but later became profoundly hypotensive   Presumed to be due to sepsis in the setting of covid pneumonia and possible super-imposed bacterial infection  was on several pressors as well as sedatives for several days, now off all pressors and on sedation with propofol   - Up on Fentanyl drip and propofol, titrate to CAROLYN-3 to -4 for comfort   - Patient made DNR/DN reintubate  - Patient being palliatively extubated today  -Palliative following   -recommend Fentanyl 50mcg IV q1h PRN for Pain and/or RR >25  -recommend Ativan 1mg IV q2h PRN for Anxiety/Agitation  -recommend Robinul 0.4mg IV q4h PRN for Excessive Secretions  -consider placing Scopolamine Patch today.    #Prior TIA  Patient reportedly taking xarelto for this, discussed with his wife  - Will hold in case he may need procedure  - Also should consider substituting for ASA later    - heparin ggt- will dc    CARDS  #Shock 2/2 sepsis  Meeting 4/4 SIRS criteria in the ED  Hypotensive with MAPS in 50s  Now on Levophed  - DC meropenam   - Reached out to vascular about digital necrosis- No acute vascular surgical intervention at this time as wounds are not infected patient is still intubated and actively with covid infection. Please reconsult once patient recovers and wants to pursue amputation per GOC  - Hand surgery consulted for digital necrosis of fingers, no current indication for amputation   - Daily wound care with betadine to gangrenous fingers and toes, keep gangrenous areas dry  - Ongoing GOC with family, palliative following   - Hypotensive again today, restarted on levo     #Ischemic limb necrosis   - Vascular consulted for LE ischemia  - Hand surgery consulted for UE digit necrosis  - Palliative reached out to patients wife about this yesterday, she has not made any decisions in regards to what they would want done   - Wife does not want to pursue amputations at this time, if patient does not make meaningful recovery by Monday, family considering terminal wean     #HTN  Patients pressures increasing off pressors  Unable to take home ACE given MARISABEL  - Holding lisinopril    #HLD  - holding home statin in setting of transaminitis     #NSTEMI  Presented with elevated troponins, ST depressions in V2-V3- was not trending  Patient noted to have ST depressions last night- Trop T 1.36, CKMB 7  Downtrended in the AM    PULM  #AHRF in the setting of COVID PNA- worsening   CT scan with b/l consolidations  Hypoxic into the 70s, altered, meeting 4/4 sirs criteria  Intubated. Patient tested + for covid on 11/29 and apparently is vaccinated   LFTs elevated. Good UOP. Bx cultures neg. P/F ratios improved   - had been tolerating CPAP trials well, however overnight patient very tachypneic, belly breathing, uncomfortable appearing-possibly due to aggitation from inadequate sedaton vs worsening of pulmonary process  On CPAP all night- Still on high pressure support and PEEP, slightly less tachypneic than yesterday but appears uncomfortable  - Up on fentanyl   - Deproned  - sputum culture Neg  - Remdesivir held in setting of transaminitis   - now on polly    - s/p Toculizumab x1 on 12/1  - 40mg IV Lasix trial   - 12/11, increasing vent requirements - bedside bronch and visualization of ETT with glidescope unrevealing, CXR unchanged     - continues to be uncomfortable on the vent    #Tachypnea  Tachypneic and overbreathing vent overnight  May be pain/anxiety driven  - Requiring a lot of pressure support on CPAP  - fentanyl drip   - Not ready for extubation    GI  # transaminitis   In the setting of sepsis  improving, initiation of remdesivir may be contributing  - hold remdesivir and statin   - ctm   - started with 5 Reglan TID    #Nutrition  -Holding tube feeds    RENAL  # MARISABEL most likely ATN from shock state- Resolved   Baseline of 1.1 and came up to 2.4, now close to back at baseline  A/w several electrolyte abnormalities - K 5.6, Phos 6.9 all resolved   - CTM    #Hypernatremia- worsening again  - ctm     HEME  No active issues    Endo  #Hypothyroid  Takes home synthroid  - c/w this med     #Gout  - Hold allopurinol     ID  #Fever  Now afebrile on meropenam. Bronch cultures with mold growth.  - Continues to fever despite broad spectrum antibiotics  - Blood and sputum cultures negative   - C/w meropenam    - Clarify with ID about length of duration    F: None   E: Replete as necessary K>4 Mg>2  N: NPO  DVT Prophylaxis: DC  GI prophylaxis: IV PPI    CODE STATUS: DNR

## 2021-12-14 NOTE — PROGRESS NOTE ADULT - SUBJECTIVE AND OBJECTIVE BOX
[facility] Geriatrics and Palliative Care  Chirag Finley, Palliative Care Attending  Contact Info: Call [phone] (HEAL Line) or message on Microsoft Teams (Chirag Finley)    SUBJECTIVE AND OBJECTIVE:  INTERVAL HPI/OVERNIGHT EVENTS: Interval events noted. Patient remains intubated and sedated. Multiple calls with wife to coordinate extubation today. She will be arriving in the evening. Patient required no additional PRNs in past 24hrs. No unexpected adverse effects of opiates noted.    ALLERGIES:  Allergy Status Unknown    MEDICATIONS  (STANDING):  albuterol/ipratropium for Nebulization 3 milliLiter(s) Nebulizer every 6 hours  chlorhexidine 0.12% Liquid 15 milliLiter(s) Oral Mucosa every 12 hours  chlorhexidine 2% Cloths 1 Application(s) Topical <User Schedule>  fentaNYL   Infusion. 0.5 MICROgram(s)/kG/Hr (4.23 mL/Hr) IV Continuous <Continuous>  norepinephrine Infusion 0.02 MICROgram(s)/kG/Min (3.17 mL/Hr) IV Continuous <Continuous>  propofol Infusion 5 MICROgram(s)/kG/Min (2.54 mL/Hr) IV Continuous <Continuous>  scopolamine 1 mG/72 Hr(s) Patch 1 Patch Transdermal once    MEDICATIONS  (PRN):  acetaminophen    Suspension .. 650 milliGRAM(s) Enteral Tube every 6 hours PRN Temp greater or equal to 38C (100.4F)  fentaNYL    Injectable 50 MICROGram(s) IV Push every 1 hour PRN RR>25  fentaNYL    Injectable 50 MICROGram(s) IV Push every 1 hour PRN Moderate Pain (4 - 6)  glycopyrrolate Injectable 0.4 milliGRAM(s) IV Push every 4 hours PRN excessive secretions  LORazepam   Injectable 1 milliGRAM(s) IV Push every 2 hours PRN Anxiety/Agitation      ITEMS UNCHECKED ARE NOT PRESENT    PRESENT SYMPTOMS: []Unable to obtain due to poor mentation   Source if other than patient:  []Family   []Team     PRESENT SYMPTOMS: [x]Unable to obtain due to poor mentation   Source if other than patient:  []Family   []Team     PAIN AD Score: 0  [email or web address] (press ctrl +  left click to view)    Dyspnea:                           []Mild  []Moderate []Severe  Anxiety:                             []Mild []Moderate []Severe  Fatigue:                             []Mild []Moderate []Severe  Nausea:                             []Mild []Moderate []Severe  Loss of Appetite:              []Mild []Moderate []Severe  Constipation:                    []Mild []Moderate []Severe    Other Symptoms:  [x]All Other Review Of Systems Negative     Palliative Performance Status Version 2:  10%    [email or web address]    PHYSICAL EXAM:  GENERAL:  []Alert  []Oriented x   []Lethargic  []Cachexia  [x]Unarousable  []Verbal  [x]Non-Verbal  Behavioral:   []Anxiety  [x]Delirium []Agitation []Cooperative  HEENT:  []Normal   []Dry mouth   [x]ET Tube/Trach  []Oral lesions  PULMONARY:   []Clear [x]Tachypnea  []Audible excessive secretions   [x]Rhonchi        []Right []Left [x]Bilateral  []Crackles        []Right []Left []Bilateral  []Wheezing     []Right []Left []Bilateral  CARDIOVASCULAR:    []Regular []Irregular [x]Tachy  []Jony []Murmur []Other  GASTROINTESTINAL:  [x]Soft  [x]Distended   [x]+BS  []Non tender []Tender  []PEG [x]OGT/ NGT  Last BM:   GENITOURINARY:  []Normal [] Incontinent   []Oliguria/Anuria   [x]Mitchell  MUSCULOSKELETAL:   []Normal   []Weakness  [x]Bed/Wheelchair bound []Edema  NEUROLOGIC:   []No focal deficits  []Cognitive impairment  []Dysphagia []Dysarthria []Paresis [x]Encephalopathic   SKIN:   []Normal   []Pressure ulcer(s)  [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    CRITICAL CARE:  [x]Shock Present  [x]Septic [ ]Cardiogenic [ ]Neurologic [ ]Hypovolemic  [x]Vasopressors [ ]Inotropes   [x]Respiratory failure present [x]Mechanical Ventilation [ ]Non-invasive ventilatory support [ ]High-Flow  [x]Acute  [ ]Chronic [x]Hypoxic  [ ]Hypercarbic  [ ]Other organ failure    Vital Signs Last 24 Hrs  T(C): 37.6 (14 Dec 2021 14:00), Max: 37.6 (13 Dec 2021 18:12)  T(F): 99.7 (14 Dec 2021 14:00), Max: 99.7 (13 Dec 2021 18:12)  HR: 99 (14 Dec 2021 17:30) (71 - 99)  BP: 112/53 (14 Dec 2021 17:00) (85/42 - 118/57)  BP(mean): 77 (14 Dec 2021 17:00) (60 - 82)  RR: 18 (14 Dec 2021 17:30) (18 - 26)  SpO2: 72% (14 Dec 2021 17:30) (72% - 100%) I&O's Summary    13 Dec 2021 07:01  -  14 Dec 2021 07:00  --------------------------------------------------------  IN: 1552.2 mL / OUT: 1005 mL / NET: 547.2 mL    14 Dec 2021 07:01  -  14 Dec 2021 17:53  --------------------------------------------------------  IN: 171.2 mL / OUT: 320 mL / NET: -148.8 mL    LABS:                         10.7   13.78 )-----------( 235      ( 13 Dec 2021 04:22 )             34.8   12-13    150<H>  |  115<H>  |  60<H>  ----------------------------<  156<H>  5.1   |  28  |  1.06    Ca    8.3<L>      13 Dec 2021 04:22  Phos  5.0     12-13  Mg     2.8     12-13    TPro  5.1<L>  /  Alb  2.5<L>  /  TBili  0.4  /  DBili  x   /  AST  128<H>  /  ALT  151<H>  /  AlkPhos  109  12-13    RADIOLOGY & ADDITIONAL STUDIES: None new    PROTEIN CALORIE MALNUTRITION PRESENT: [ ]mild [ ]moderate [ ]severe [ ]underweight [ ]morbid obesity  [] PPSV2 < or = 30% [] significant weight loss [] poor nutritional intake [] anasarca [] catabolic state    Artificial Nutrition []     REFERRALS:  [x]Social Work  []Case management []PT/OT []Chaplaincy  []Hospice  []Patient/Family Support    DISCUSSION OF CASE: Alice Schreiber - to provide updates and emotional support [facility] Geriatrics and Palliative Care  Chirag Finley, Palliative Care Attending  Contact Info: Call [phone] (HEAL Line) or message on Microsoft Teams (Chirag Finley)    SUBJECTIVE AND OBJECTIVE:  INTERVAL HPI/OVERNIGHT EVENTS: Interval events noted. Patient remains intubated and sedated. Multiple calls with wife to coordinate extubation today. She will be arriving in the evening. Patient required no additional PRNs in past 24hrs. No unexpected adverse effects of opiates noted.    ALLERGIES:  Allergy Status Unknown    MEDICATIONS  (STANDING):  albuterol/ipratropium for Nebulization 3 milliLiter(s) Nebulizer every 6 hours  chlorhexidine 0.12% Liquid 15 milliLiter(s) Oral Mucosa every 12 hours  chlorhexidine 2% Cloths 1 Application(s) Topical <User Schedule>  fentaNYL   Infusion. 0.5 MICROgram(s)/kG/Hr (4.23 mL/Hr) IV Continuous <Continuous>  norepinephrine Infusion 0.02 MICROgram(s)/kG/Min (3.17 mL/Hr) IV Continuous <Continuous>  propofol Infusion 5 MICROgram(s)/kG/Min (2.54 mL/Hr) IV Continuous <Continuous>  scopolamine 1 mG/72 Hr(s) Patch 1 Patch Transdermal once    MEDICATIONS  (PRN):  acetaminophen    Suspension .. 650 milliGRAM(s) Enteral Tube every 6 hours PRN Temp greater or equal to 38C (100.4F)  fentaNYL    Injectable 50 MICROGram(s) IV Push every 1 hour PRN RR>25  fentaNYL    Injectable 50 MICROGram(s) IV Push every 1 hour PRN Moderate Pain (4 - 6)  glycopyrrolate Injectable 0.4 milliGRAM(s) IV Push every 4 hours PRN excessive secretions  LORazepam   Injectable 1 milliGRAM(s) IV Push every 2 hours PRN Anxiety/Agitation      ITEMS UNCHECKED ARE NOT PRESENT    PRESENT SYMPTOMS: [x]Unable to obtain due to poor mentation   Source if other than patient:  []Family   []Team     PAIN AD Score: 0  [email or web address] (press ctrl +  left click to view)    Dyspnea:                           []Mild  []Moderate []Severe  Anxiety:                             []Mild []Moderate []Severe  Fatigue:                             []Mild []Moderate []Severe  Nausea:                             []Mild []Moderate []Severe  Loss of Appetite:              []Mild []Moderate []Severe  Constipation:                    []Mild []Moderate []Severe    Other Symptoms:  [x]All Other Review Of Systems Negative     Palliative Performance Status Version 2:  10%    [email or web address]/[email or web address]_performance_scale_[email or web address]    PHYSICAL EXAM:  GENERAL:  []Alert  []Oriented x   []Lethargic  []Cachexia  [x]Unarousable  []Verbal  [x]Non-Verbal  Behavioral:   []Anxiety  [x]Delirium []Agitation []Cooperative  HEENT:  []Normal   []Dry mouth   [x]ET Tube/Trach  []Oral lesions  PULMONARY:   []Clear [x]Tachypnea  []Audible excessive secretions   [x]Rhonchi        []Right []Left [x]Bilateral  []Crackles        []Right []Left []Bilateral  []Wheezing     []Right []Left []Bilateral  CARDIOVASCULAR:    []Regular []Irregular [x]Tachy  []Jony []Murmur []Other  GASTROINTESTINAL:  [x]Soft  [x]Distended   [x]+BS  []Non tender []Tender  []PEG [x]OGT/ NGT  Last BM:   GENITOURINARY:  []Normal [] Incontinent   []Oliguria/Anuria   [x]Mitchell  MUSCULOSKELETAL:   []Normal   []Weakness  [x]Bed/Wheelchair bound []Edema  NEUROLOGIC:   []No focal deficits  []Cognitive impairment  []Dysphagia []Dysarthria []Paresis [x]Encephalopathic   SKIN:   []Normal   []Pressure ulcer(s)  [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    CRITICAL CARE:  [x]Shock Present  [x]Septic [ ]Cardiogenic [ ]Neurologic [ ]Hypovolemic  [x]Vasopressors [ ]Inotropes   [x]Respiratory failure present [x]Mechanical Ventilation [ ]Non-invasive ventilatory support [ ]High-Flow  [x]Acute  [ ]Chronic [x]Hypoxic  [ ]Hypercarbic  [ ]Other organ failure    Vital Signs Last 24 Hrs  T(C): 37.6 (14 Dec 2021 14:00), Max: 37.6 (13 Dec 2021 18:12)  T(F): 99.7 (14 Dec 2021 14:00), Max: 99.7 (13 Dec 2021 18:12)  HR: 99 (14 Dec 2021 17:30) (71 - 99)  BP: 112/53 (14 Dec 2021 17:00) (85/42 - 118/57)  BP(mean): 77 (14 Dec 2021 17:00) (60 - 82)  RR: 18 (14 Dec 2021 17:30) (18 - 26)  SpO2: 72% (14 Dec 2021 17:30) (72% - 100%) I&O's Summary    13 Dec 2021 07:01  -  14 Dec 2021 07:00  --------------------------------------------------------  IN: 1552.2 mL / OUT: 1005 mL / NET: 547.2 mL    14 Dec 2021 07:01  -  14 Dec 2021 17:53  --------------------------------------------------------  IN: 171.2 mL / OUT: 320 mL / NET: -148.8 mL    LABS:                         10.7   13.78 )-----------( 235      ( 13 Dec 2021 04:22 )             34.8   12-13    150<H>  |  115<H>  |  60<H>  ----------------------------<  156<H>  5.1   |  28  |  1.06    Ca    8.3<L>      13 Dec 2021 04:22  Phos  5.0     12-13  Mg     2.8     12-13    TPro  5.1<L>  /  Alb  2.5<L>  /  TBili  0.4  /  DBili  x   /  AST  128<H>  /  ALT  151<H>  /  AlkPhos  109  12-13    RADIOLOGY & ADDITIONAL STUDIES: None new    PROTEIN CALORIE MALNUTRITION PRESENT: [ ]mild [ ]moderate [ ]severe [ ]underweight [ ]morbid obesity  [] PPSV2 < or = 30% [] significant weight loss [] poor nutritional intake [] anasarca [] catabolic state    Artificial Nutrition []     REFERRALS:  [x]Social Work  []Case management []PT/OT []Chaplaincy  []Hospice  []Patient/Family Support    DISCUSSION OF CASE: Alice Schreiber - to provide updates and emotional support

## 2021-12-14 NOTE — PROGRESS NOTE ADULT - SUBJECTIVE AND OBJECTIVE BOX
OVERNIGHT EVENTS:  HARRY    SUBJECTIVE:   Intubated and sedated    VITAL SIGNS:  T(F): 99.7 (12-14-21 @ 14:00)  HR: 99 (12-14-21 @ 17:30)  BP: 112/53 (12-14-21 @ 17:00)  RR: 18 (12-14-21 @ 17:30)  SpO2: 72% (12-14-21 @ 17:30)    12-13-21 @ 07:01  -  12-14-21 @ 07:00  --------------------------------------------------------  IN: 1552.2 mL / OUT: 1005 mL / NET: 547.2 mL    12-14-21 @ 07:01  -  12-14-21 @ 17:56  --------------------------------------------------------  IN: 171.2 mL / OUT: 320 mL / NET: -148.8 mL    PHYSICAL EXAM:    Constitutional: Laying in bed, intubated, heavily sedated  Neurological: On sedation,  HEENT:  neck supple, no masses, no JVD  Respiratory: Belly breathing, minor crackles bl, otherwise clear   Cardiovascular: RRR, normal S1S2, no murmurs or rubs   Gastrointestinal: +BS, not distended, not rigid  Extremities: Extremities are more warm distally in all 4 extremities, weak distal pulses  Skin: Patients feet, toes and fingers now appear mottled and dark, large open wound on the right forearm - worsening, new left forearm wound    MEDICATIONS  (STANDING):  albuterol/ipratropium for Nebulization 3 milliLiter(s) Nebulizer every 6 hours  chlorhexidine 0.12% Liquid 15 milliLiter(s) Oral Mucosa every 12 hours  chlorhexidine 2% Cloths 1 Application(s) Topical <User Schedule>  fentaNYL   Infusion. 0.5 MICROgram(s)/kG/Hr (4.23 mL/Hr) IV Continuous <Continuous>  norepinephrine Infusion 0.02 MICROgram(s)/kG/Min (3.17 mL/Hr) IV Continuous <Continuous>  propofol Infusion 5 MICROgram(s)/kG/Min (2.54 mL/Hr) IV Continuous <Continuous>  scopolamine 1 mG/72 Hr(s) Patch 1 Patch Transdermal once    MEDICATIONS  (PRN):  acetaminophen    Suspension .. 650 milliGRAM(s) Enteral Tube every 6 hours PRN Temp greater or equal to 38C (100.4F)  fentaNYL    Injectable 50 MICROGram(s) IV Push every 1 hour PRN RR>25  fentaNYL    Injectable 50 MICROGram(s) IV Push every 1 hour PRN Moderate Pain (4 - 6)  glycopyrrolate Injectable 0.4 milliGRAM(s) IV Push every 4 hours PRN excessive secretions  LORazepam   Injectable 1 milliGRAM(s) IV Push every 2 hours PRN Anxiety/Agitation    Allergies    Allergy Status Unknown    Intolerances    LABS:                        10.7   13.78 )-----------( 235      ( 13 Dec 2021 04:22 )             34.8     12-13    150<H>  |  115<H>  |  60<H>  ----------------------------<  156<H>  5.1   |  28  |  1.06    Ca    8.3<L>      13 Dec 2021 04:22  Phos  5.0     12-13  Mg     2.8     12-13    TPro  5.1<L>  /  Alb  2.5<L>  /  TBili  0.4  /  DBili  x   /  AST  128<H>  /  ALT  151<H>  /  AlkPhos  109  12-13

## 2021-12-15 NOTE — PROGRESS NOTE ADULT - PROBLEM SELECTOR PLAN 3
.  -no plans to pursue amputation, patient would not accept that QoL
.  In the setting of COVID  -wife is in agreement that patient would not be accepting of trach/PEG if he cannot be extubated

## 2021-12-15 NOTE — PROGRESS NOTE ADULT - PROBLEM SELECTOR PLAN 1
.  -c/w Fentanyl 50mcg IV q1h PRN for Pain and/or RR >25  -c/w Ativan 1mg IV q2h PRN for Anxiety/Agitation  -c/w Robinul 0.4mg IV q4h PRN for Excessive Secretions  -c/w Scopolamine Patch  -d/c Propofol but maintain Fentanyl gtt at time of extubation
.  -recommend Fentanyl 50mcg IV q1h PRN for Pain and/or RR >25  -recommend Ativan 1mg IV q2h PRN for Anxiety/Agitation  -recommend Robinul 0.4mg IV q4h PRN for Excessive Secretions  -consider placing Scopolamine Patch today
.  -recommend transitioning to Dilaudid gtt @ 2.5mg/hr and Dilaudid 5mg IV q2h PRN for RR >22 if being transferred to floor  -c/w Ativan 1mg IV q2h PRN for Anxiety/Agitation  -c/w Robinul 0.4mg IV q4h PRN for Excessive Secretions  -c/w Scopolamine Patch
.  PPSV = 10%, requires total assistance for all ADLs  -c/w supportive care

## 2021-12-15 NOTE — PROGRESS NOTE ADULT - SUBJECTIVE AND OBJECTIVE BOX
[facility] Geriatrics and Palliative Care  Chirag Finley, Palliative Care Attending  Contact Info: Call [phone] (HEAL Line) or message on [employer] Teams (Chirag Finley)    SUBJECTIVE AND OBJECTIVE:  INTERVAL HPI/OVERNIGHT EVENTS: Interval events noted. Patient noted to be tachypneic to 30 this morning as well as most of the night according to the flowsheets. Asked RN to provide Fentanyl PRN now. Discussed with primary team, they plan to step down the patient. Patient received PRNs of Fentanyl x1 in past 24hrs.    ALLERGIES:  Allergy Status Unknown    MEDICATIONS  (STANDING):  HYDROmorphone Infusion 2.5 mG/Hr (2.5 mL/Hr) IV Continuous <Continuous>  LORazepam   Injectable 1 milliGRAM(s) IV Push every 8 hours    MEDICATIONS  (PRN):  acetaminophen    Suspension .. 650 milliGRAM(s) Enteral Tube every 6 hours PRN Temp greater or equal to 38C (100.4F)  glycopyrrolate Injectable 0.4 milliGRAM(s) IV Push every 4 hours PRN excessive secretions  HYDROmorphone  Injectable 5 milliGRAM(s) IV Push every 2 hours PRN Moderate or severe pain (4 - 10)  HYDROmorphone  Injectable 5 milliGRAM(s) IV Push every 2 hours PRN Tachypnea (RR > 22)  LORazepam   Injectable 1 milliGRAM(s) IV Push every 2 hours PRN Anxiety/Agitation      ITEMS UNCHECKED ARE NOT PRESENT    PRESENT SYMPTOMS: [x]Unable to obtain due to poor mentation   Source if other than patient:  []Family   []Team     PAIN AD Score: 4  [email or web address]/[email or web address] (press ctrl +  left click to view)    Dyspnea:                           []Mild  []Moderate []Severe  Anxiety:                             []Mild []Moderate []Severe  Fatigue:                             []Mild []Moderate []Severe  Nausea:                             []Mild []Moderate []Severe  Loss of Appetite:              []Mild []Moderate []Severe  Constipation:                    []Mild []Moderate []Severe    Other Symptoms:  [x]All Other Review Of Systems Negative     Palliative Performance Status Version 2:  10%    http://[facility]pc[facility].org/files/news/palliative_performance_scale_[email or web address].pdf    PHYSICAL EXAM:  GENERAL:  []Alert  []Oriented x   []Lethargic  []Cachexia  [x]Unarousable  []Verbal  [x]Non-Verbal  Behavioral:   []Anxiety  [x]Delirium []Agitation []Cooperative  HEENT:  []Normal   []Dry mouth   [x]ET Tube/Trach  []Oral lesions  PULMONARY:   []Clear [x]Tachypnea  []Audible excessive secretions   [x]Rhonchi        []Right []Left [x]Bilateral  []Crackles        []Right []Left []Bilateral  []Wheezing     []Right []Left []Bilateral  CARDIOVASCULAR:    []Regular []Irregular [x]Tachy  []Jony []Murmur []Other  GASTROINTESTINAL:  [x]Soft  [x]Distended   [x]+BS  []Non tender []Tender  []PEG [x]OGT/ NGT  Last BM:   GENITOURINARY:  []Normal [] Incontinent   []Oliguria/Anuria   [x]Mitchell  MUSCULOSKELETAL:   []Normal   []Weakness  [x]Bed/Wheelchair bound []Edema  NEUROLOGIC:   []No focal deficits  []Cognitive impairment  []Dysphagia []Dysarthria []Paresis [x]Encephalopathic   SKIN:   []Normal   []Pressure ulcer(s)  [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    CRITICAL CARE:  [x]Shock Present  [x]Septic [ ]Cardiogenic [ ]Neurologic [ ]Hypovolemic  [x]Vasopressors [ ]Inotropes   [x]Respiratory failure present [ ]Mechanical Ventilation [ ]Non-invasive ventilatory support [ ]High-Flow  [x]Acute  [ ]Chronic [x]Hypoxic  [ ]Hypercarbic  [ ]Other organ failure    Vital Signs Last 24 Hrs  T(C): 36.6 (15 Dec 2021 09:00), Max: 37.6 (14 Dec 2021 14:00)  T(F): 97.9 (15 Dec 2021 09:00), Max: 99.7 (14 Dec 2021 14:00)  HR: 98 (15 Dec 2021 12:00) (74 - 111)  BP: 61/38 (15 Dec 2021 12:00) (61/38 - 127/61)  BP(mean): 45 (15 Dec 2021 12:00) (45 - 88)  RR: 17 (15 Dec 2021 12:00) (17 - 36)  SpO2: 76% (15 Dec 2021 12:00) (51% - 100%) I&O's Summary    14 Dec 2021 07:01  -  15 Dec 2021 07:00  --------------------------------------------------------  IN: 567.2 mL / OUT: 2270 mL / NET: -1702.8 mL    15 Dec 2021 07:01  -  15 Dec 2021 13:47  --------------------------------------------------------  IN: 40.2 mL / OUT: 220 mL / NET: -179.8 mL    LABS: None new    RADIOLOGY & ADDITIONAL STUDIES: None new    REFERRALS:  [x]Social Work  []Case management []PT/OT []Chaplaincy  []Hospice  []Patient/Family Support    Goals of Care Document:   Care Coordination Document:    PROGRESS NOTE  Date & Time of Note   2021-12-13 14:07  Notes: Case discussed in IDR. Per MICU team, patient for possible extubation  later this afternoon. Anticipated dispo plan: pending hospital course and  progress. CM remains available.  Electronically signed by:  Leticia Mims  Electronically signed on:  2021-12-13  14:09

## 2021-12-15 NOTE — PROGRESS NOTE ADULT - SUBJECTIVE AND OBJECTIVE BOX
HOSPITAL COURSE: 88 y/o male with unknown PMHx but known to be on "blood thinners", recent diagnosis of Covid-19 2 days prior to admission, who presented after being found altered, naked, and confused in his apartment. Patient admitted for AHRF with O2 sats in the 70s, hypotensive so placed on Levo, and then intubated for respiratory distress and transferred to MICU for further management of encephalopathy. Patient also has digital necrosis of fingers, vascular and hand surgery consulted with no intervention given COVID infection and GOC discussion with family. Regarding pulmonary process, Sputum cultures NGTD, however worsening of pulmonary process with patient consistently tachypneic and belly breathing. Palliative consulted. Now patient off all pressors, only sedation with propofol. Compassionate Palliative extubation 12/14, family aware. Stable for stepdown for comfort care.    OVERNIGHT EVENTS: Comfort measures initiated. Plan to step down to [facility] for comfort care.    SUBJECTIVE:  Patient seen and examined at bedside. [email or web address], Unable to obtain further questioning.    Vital Signs Last 12 Hrs  T(F): 97.9 (12-15-21 @ 09:00), Max: 99 (12-15-21 @ 01:04)  HR: 100 (12-15-21 @ 11:00) (98 - 111)  BP: 68/37 (12-15-21 @ 11:00) (68/37 - 127/61)  BP(mean): 48 (12-15-21 @ 11:00) (48 - 88)  RR: 21 (12-15-21 @ 11:00) (21 - 36)  SpO2: 68% (12-15-21 @ 11:00) (51% - 68%)  I&O's Summary    14 Dec 2021 07:01  -  15 Dec 2021 07:00  --------------------------------------------------------  IN: 567.2 mL / OUT: 2270 mL / NET: -1702.8 mL    15 Dec 2021 07:01  -  15 Dec 2021 11:36  --------------------------------------------------------  IN: 38.2 mL / OUT: 205 mL / NET: -166.8 mL      PHYSICAL EXAM:  Constitutional: Laying in bed, intubated, heavily sedated  Neurological: Inubated& Sedated  HEENT:  neck supple, no masses, no JVD  Respiratory: Belly breathing, minor crackles bl, otherwise clear   Cardiovascular: RRR, normal S1S2, no murmurs or rubs   Gastrointestinal: +BS, not distended, not rigid  Extremities: Extremities are more warm distally in all 4 extremities, weak distal pulses  Skin: Patients feet, toes and fingers now appear mottled and dark, large open wound on the right forearm - worsening, new left forearm wound      LABS:    RADIOLOGY & ADDITIONAL TESTS:    MEDICATIONS  (STANDING):  chlorhexidine 0.12% Liquid 15 milliLiter(s) Oral Mucosa every 12 hours  chlorhexidine 2% Cloths 1 Application(s) Topical <User Schedule>  HYDROmorphone Infusion 2 mG/Hr (2 mL/Hr) IV Continuous <Continuous>  LORazepam   Injectable 1 milliGRAM(s) IV Push every 8 hours    MEDICATIONS  (PRN):  acetaminophen    Suspension .. 650 milliGRAM(s) Enteral Tube every 6 hours PRN Temp greater or equal to 38C (100.4F)  glycopyrrolate Injectable 0.4 milliGRAM(s) IV Push every 4 hours PRN excessive secretions  HYDROmorphone  Injectable 4 milliGRAM(s) IV Push every 2 hours PRN Moderate or severe pain (4 - 10)  HYDROmorphone  Injectable 4 milliGRAM(s) IV Push every 2 hours PRN Tachypnea (RR > 22)  LORazepam   Injectable 1 milliGRAM(s) IV Push every 2 hours PRN Anxiety/Agitation

## 2021-12-15 NOTE — PROGRESS NOTE ADULT - PROBLEM SELECTOR PLAN 2
.  PPSV = 10%, requires total assistance for all ADLs  -c/w supportive care
.  -wife states that amputations would not be within the patient's GOC

## 2021-12-15 NOTE — PROGRESS NOTE ADULT - ASSESSMENT
88 y/o male with HTN, gout, hypothyroid, and previous TIA on xarelto, and with a recent diagnosis of Covid-19 on 11/29 who presented with acute altered mental status change, HD instability, and hypoxia 2/2 covid pneumonia with superimposed bacterial pneumonia, found to have b/l pulmonary consolidations on CT scan, s/p intubation, started on multiple pressors and antibiotics, patient now off pressors and more stable, s/p pallitaive extubation 12/14, now on comfort measures.    NEURO  #Encephalopathy in the setting of sepsis and hypoxia   I/s/o covid pneumonia and possible super-imposed bacterial infection  AAOx0  DNR/DNI  D/C fentanyl  Plan:  - palliatively extubation 12/14  - Palliative following  - C/w Ativan 1mg IV q2h PRN for Anxiety/Agitation  - C/w Robinul 0.4mg IV q4h PRN for Excessive Secretions    #Prior TIA  - D/C'd heparin gtt, comfort care    CARDS  #Shock 2/2 sepsis RESOLVED  Meeting 4/4 SIRS criteria in the ED, i/s/o COVID PNA and superimposed bacterial infxns  - Previously on levophed  - Vascular following: SIGNED OFF, No acute vascular surgical intervention at this time as wounds are not infected patient is still intubated and actively with covid infection. Please reconsult once patient recovers and wants to pursue amputation per GOC  - Hand surgery consulted for digital necrosis of fingers, no current indication for amputation   - Daily wound care with betadine to gangrenous fingers and toes, keep gangrenous areas dry  Plan:  - Ongoing GOC with family, palliative following     #Ischemic limb necrosis   - Vascular consulted for LE ischemia, recs appreciaed  - Hand surgery consulted for UE digit necrosis, recs appreciated  - Comfort care: Wife does not want to pursue amputations at this time, family considering terminal wean     #HTN  Normotensive  Unable to take home ACE given MARISABEL  - Holding lisinopril    #HLD  - holding home statin in setting of transaminitis     #NSTEMI RESOLVED  Presented with elevated troponins, ST depressions in V2-V3- was not trending    PULM  #AHRF in the setting of COVID PNA  CT scan with b/l consolidations  Hypoxic into the 70s, altered, meeting 4/4 sirs criteria  Intubated. Patient tested + for covid on 11/29 and apparently is vaccinated   LFTs elevated. Good UOP. Bx cultures neg, sputum cx negative. P/F ratios improved, s/p Toculizumab x1 on 12/1  Fentanyl D/C  S/p lasix trial  - Deproned  - Remdesivir held in setting of transaminitis  Plan:   - Comfort care  - Palliative extubation 12/14    #Tachypnea  Likely 2/2 pain/anxiety driven  - C/w fentanyl drip   - Ativan pushes  - comfort care    GI  #Transaminitis  2/2 sepsis  - holding remdesivir and statin     #Nutrition  -Holding tube feeds    RENAL  #MARISABEL most likely ATN from shock state- RESOLVED  Baseline of 1.1 and came up to 2.4, now close to back at baseline  A/w several electrolyte abnormalities - K 5.6, Phos 6.9 all resolved   - CTM    #Hypernatremia- worsening again  - ctm     HEME  No active issues    Endo  #Hypothyroid  - Comfort care, not on synthroid    #Gout  - Hold allopurinol     ID      F: None   E: Replete as necessary K>4 Mg>2  N: NPO  DVT Prophylaxis: DC  GI prophylaxis: IV PPI    CODE STATUS: DNR

## 2021-12-15 NOTE — PROGRESS NOTE ADULT - PROBLEM SELECTOR PLAN 5
.  Previously vaccinated  -required proning for ARDS
.  In addition to the E/M visit, an advance care planning meeting was performed. Start time: 5:00PM; End time: 5:16PM; Total time: 16min. A face to face meeting to discuss advance care planning was held today regarding: ARMINDA BARRY. Primary decision maker: Patient is unable to participate in decision making; Alternate/surrogate: Fredi Barry. Discussed advance directives including, but not limited to, healthcare proxy and code status. Decision regarding code status: DNR/DNI; Documentation completed today: [facility] note

## 2021-12-15 NOTE — PROGRESS NOTE ADULT - PROVIDER SPECIALTY LIST ADULT
MICU
Palliative Care
MICU
MICU
Palliative Care

## 2021-12-15 NOTE — PROGRESS NOTE ADULT - PROBLEM SELECTOR PLAN 4
.  In the setting of COVID  -patient would not want trach/PEG if he cannot be extubated  -compassionate extubation performed today  -tube feeds and pressors discontinued
.  In the setting of COVID  -patient would not want trach/PEG if he cannot be extubated  -compassionate extubation performed today  -tube feeds and pressors discontinued
.  In the setting of COVID  -patient would not want trach/PEG if he cannot be extubated  -plan for compassionate extubation tomorrow afternoon  -stop tube feeds to prevent aspiration  -discontinue pressors after pre-medication immediately before extubation
.  Previously vaccinated  -required proning for ARDS

## 2021-12-15 NOTE — PROGRESS NOTE ADULT - PROBLEM SELECTOR PROBLEM 1
Dyspnea and respiratory abnormality
Functional quadriplegia
Dyspnea and respiratory abnormality
Dyspnea and respiratory abnormality

## 2021-12-15 NOTE — PROGRESS NOTE ADULT - PROBLEM SELECTOR PROBLEM 4
Pneumonia due to COVID-19 virus
Acute respiratory failure with hypoxia

## 2021-12-15 NOTE — DISCHARGE NOTE FOR THE EXPIRED PATIENT - HOSPITAL COURSE
86 y/o male with unknown PMHx but known to be on "blood thinners", recent diagnosis of Covid-19 2 days prior to admission, who presented after being found altered, naked, and confused in his apartment. Patient admitted for AHRF with O2 sats in the 70s, hypotensive so placed on Levo, and then intubated for respiratory distress and transferred to MICU for further management of encephalopathy. Patient also has digital necrosis of fingers, vascular and hand surgery consulted with no intervention given COVID infection and GOC discussion with family. Regarding pulmonary process, Sputum cultures NGTD, however worsening of pulmonary process with patient consistently tachypneic and belly breathing. Palliative consulted. Now patient off all pressors, only sedation with propofol. Compassionate Palliative extubation , family aware. Patient  on Dec 15, 2021 at 12:56PM. No spontaneous movements were present. There was not response to verbal, tactile, or painful stimuli. Pupils were mid-dilated and fixed. No breath sounds were appreciated over both lung fields. No femoral, radial, or carotid pulses were palpable. No heart sounds were auscultated over entire precordium. Family, resident, and attending physician, were notified.

## 2021-12-15 NOTE — PROGRESS NOTE ADULT - PROBLEM SELECTOR PROBLEM 6
Advanced care planning/counseling discussion
Advanced care planning/counseling discussion
Encounter for palliative care
Advanced care planning/counseling discussion

## 2021-12-15 NOTE — PROGRESS NOTE ADULT - PROBLEM SELECTOR PLAN 7
Complex medical decision making in the setting of critical illness.    Compassionately extubated this afternoon, complex symptom management needs at end of life requiring ordering and monitoring of parenteral controlled substances.  Will continue to follow for ongoing titration of palliative regimen.   Emotional support provided, questions answered.  Active Psychosocial Referrals: [facility]    Coping: [ ] well [ ] with difficulty [ ] poor coping [x] unable to assess  Support system: [ ] strong [x] adequate [ ] inadequate    For new or uncontrolled symptoms, please call Palliative Care at [phone]-[facility]. The service is available 24/7 (including nights & weekends) to provide symptom management recommendations over the phone as appropriate
Complex medical decision making in the setting of critical illness.    Decision made to de-escalate care due to poor prognosis.  Will continue to follow for ongoing titration of palliative regimen.   Emotional support provided, questions answered.  Active Psychosocial Referrals: SW    Coping: [ ] well [ ] with difficulty [ ] poor coping [x] unable to assess  Support system: [ ] strong [x] adequate [ ] inadequate    For new or uncontrolled symptoms, please call Palliative Care at [phone]. The service is available 24/7 (including nights & weekends) to provide symptom management recommendations over the phone as appropriate
Complex medical decision making in the setting of critical illness.    Actively dying. Required ongoing ordering and titration of parenteral controlled substances to maximize comfort.  Will continue to follow for ongoing titration of palliative regimen.   Emotional support provided, questions answered.  Active Psychosocial Referrals: SERENA    Coping: [ ] well [ ] with difficulty [ ] poor coping [x] unable to assess  Support system: [ ] strong [x] adequate [ ] inadequate    For new or uncontrolled symptoms, please call Palliative Care at [phone]. The service is available 24/7 (including nights & weekends) to provide symptom management recommendations over the phone as appropriate

## 2021-12-15 NOTE — PROGRESS NOTE ADULT - PROBLEM SELECTOR PROBLEM 5
Pneumonia due to COVID-19 virus
Advanced care planning/counseling discussion
Pneumonia due to COVID-19 virus
Pneumonia due to COVID-19 virus

## 2021-12-15 NOTE — PROGRESS NOTE ADULT - ASSESSMENT
86yo M with PMH of HTN, Hypothyroidism, on Anticoagulation p/w respiratory failure and found to have COVID. Palliative consulted for complex medical decision making in the setting of critical illness / ICU trigger.    ·	patient's symptoms are not adequately controlled, asked RN to provide a Fentanyl PRN now  ·	on re-evaluation, Fentanyl gtt had been increased to 2mcg/kg/hr and patient appeared more comfortable  ·	recommend transitioning to Dilaudid gtt @ 2.5mg/hr and Dilaudid 5mg IV q2h PRN for RR >22 if being transferred to floor

## 2021-12-15 NOTE — PROGRESS NOTE ADULT - PROBLEM SELECTOR PLAN 6
.  Patient is DNR/DNI, MOLST in chart  -see GOC note from 12/9 re: code status  -see GOC note from 12/9 re: compassionate extubation    Patient assessed on 12-14-21 to manage: GOC/Advanced Directives, Symptoms, and Supportive Care. 35 Minutes; Start: 5:00PM,End: 5:35PM, Non-Face-to-Face prolonged service provided that relates to Face-to-Face care that has occurred and ongoing patient management, including one or more of the following: - Reviewed documentation from other physicians and other health care professional services - Reviewed other medical records and diagnostic / radiology study results - Coordination with patient's support system
.  Patient is DNR/DNI, MOLST in chart  -see GOC note from 12/9 re: code status  -see GOC note from 12/9 re: compassionate extubation
.  In addition to the E/M visit, an advance care planning meeting was performed. Start time: 12:30PM; End time: 12:46PM; Total time: 16min. A face to face meeting to discuss advance care planning was held today regarding: ARMINDA BARRY. Primary decision maker: Patient is unable to participate in decision making; Alternate/surrogate: Fredi Barry. Discussed advance directives including, but not limited to, healthcare proxy and code status. Decision regarding code status: DNR/DNI; Documentation completed today: [facility] note
Complex medical decision making in the setting of critical illness.    Decision made to be DNR.  Will continue to follow for ongoing GOC discussion.   Emotional support provided, questions answered.  Active Psychosocial Referrals: SW    Coping: [ ] well [ ] with difficulty [ ] poor coping [x] unable to assess  Support system: [ ] strong [x] adequate [ ] inadequate    For new or uncontrolled symptoms, please call Palliative Care at [phone]. The service is available 24/7 (including nights & weekends) to provide symptom management recommendations over the phone as appropriate

## 2021-12-17 LAB
CULTURE RESULTS: SIGNIFICANT CHANGE UP
SPECIMEN SOURCE: SIGNIFICANT CHANGE UP

## 2021-12-20 DIAGNOSIS — Z79.01 LONG TERM (CURRENT) USE OF ANTICOAGULANTS: ICD-10-CM

## 2021-12-20 DIAGNOSIS — R53.2 FUNCTIONAL QUADRIPLEGIA: ICD-10-CM

## 2021-12-20 DIAGNOSIS — E87.0 HYPEROSMOLALITY AND HYPERNATREMIA: ICD-10-CM

## 2021-12-20 DIAGNOSIS — I21.4 NON-ST ELEVATION (NSTEMI) MYOCARDIAL INFARCTION: ICD-10-CM

## 2021-12-20 DIAGNOSIS — Z66 DO NOT RESUSCITATE: ICD-10-CM

## 2021-12-20 DIAGNOSIS — R33.9 RETENTION OF URINE, UNSPECIFIED: ICD-10-CM

## 2021-12-20 DIAGNOSIS — I10 ESSENTIAL (PRIMARY) HYPERTENSION: ICD-10-CM

## 2021-12-20 DIAGNOSIS — J80 ACUTE RESPIRATORY DISTRESS SYNDROME: ICD-10-CM

## 2021-12-20 DIAGNOSIS — A41.9 SEPSIS, UNSPECIFIED ORGANISM: ICD-10-CM

## 2021-12-20 DIAGNOSIS — J90 PLEURAL EFFUSION, NOT ELSEWHERE CLASSIFIED: ICD-10-CM

## 2021-12-20 DIAGNOSIS — G92.8 OTHER TOXIC ENCEPHALOPATHY: ICD-10-CM

## 2021-12-20 DIAGNOSIS — E16.2 HYPOGLYCEMIA, UNSPECIFIED: ICD-10-CM

## 2021-12-20 DIAGNOSIS — R74.01 ELEVATION OF LEVELS OF LIVER TRANSAMINASE LEVELS: ICD-10-CM

## 2021-12-20 DIAGNOSIS — U07.1 COVID-19: ICD-10-CM

## 2021-12-20 DIAGNOSIS — E78.5 HYPERLIPIDEMIA, UNSPECIFIED: ICD-10-CM

## 2021-12-20 DIAGNOSIS — M10.9 GOUT, UNSPECIFIED: ICD-10-CM

## 2021-12-20 DIAGNOSIS — I96 GANGRENE, NOT ELSEWHERE CLASSIFIED: ICD-10-CM

## 2021-12-20 DIAGNOSIS — Z86.73 PERSONAL HISTORY OF TRANSIENT ISCHEMIC ATTACK (TIA), AND CEREBRAL INFARCTION WITHOUT RESIDUAL DEFICITS: ICD-10-CM

## 2021-12-20 DIAGNOSIS — E03.9 HYPOTHYROIDISM, UNSPECIFIED: ICD-10-CM

## 2021-12-20 DIAGNOSIS — A41.89 OTHER SPECIFIED SEPSIS: ICD-10-CM

## 2021-12-20 DIAGNOSIS — N17.9 ACUTE KIDNEY FAILURE, UNSPECIFIED: ICD-10-CM

## 2021-12-20 DIAGNOSIS — J12.82 PNEUMONIA DUE TO CORONAVIRUS DISEASE 2019: ICD-10-CM

## 2021-12-20 DIAGNOSIS — R73.9 HYPERGLYCEMIA, UNSPECIFIED: ICD-10-CM

## 2021-12-20 DIAGNOSIS — Z51.5 ENCOUNTER FOR PALLIATIVE CARE: ICD-10-CM

## 2021-12-20 DIAGNOSIS — R65.21 SEVERE SEPSIS WITH SEPTIC SHOCK: ICD-10-CM

## 2021-12-20 DIAGNOSIS — J15.9 UNSPECIFIED BACTERIAL PNEUMONIA: ICD-10-CM

## 2021-12-30 PROCEDURE — 83605 ASSAY OF LACTIC ACID: CPT

## 2021-12-30 PROCEDURE — 85730 THROMBOPLASTIN TIME PARTIAL: CPT

## 2021-12-30 PROCEDURE — 86769 SARS-COV-2 COVID-19 ANTIBODY: CPT

## 2021-12-30 PROCEDURE — 71045 X-RAY EXAM CHEST 1 VIEW: CPT

## 2021-12-30 PROCEDURE — 82010 KETONE BODYS QUAN: CPT

## 2021-12-30 PROCEDURE — 96376 TX/PRO/DX INJ SAME DRUG ADON: CPT

## 2021-12-30 PROCEDURE — 70450 CT HEAD/BRAIN W/O DYE: CPT | Mod: MB

## 2021-12-30 PROCEDURE — 83880 ASSAY OF NATRIURETIC PEPTIDE: CPT

## 2021-12-30 PROCEDURE — 94640 AIRWAY INHALATION TREATMENT: CPT

## 2021-12-30 PROCEDURE — 82550 ASSAY OF CK (CPK): CPT

## 2021-12-30 PROCEDURE — 96375 TX/PRO/DX INJ NEW DRUG ADDON: CPT

## 2021-12-30 PROCEDURE — 80053 COMPREHEN METABOLIC PANEL: CPT

## 2021-12-30 PROCEDURE — 87449 NOS EACH ORGANISM AG IA: CPT

## 2021-12-30 PROCEDURE — 93005 ELECTROCARDIOGRAM TRACING: CPT

## 2021-12-30 PROCEDURE — 94003 VENT MGMT INPAT SUBQ DAY: CPT

## 2021-12-30 PROCEDURE — 87086 URINE CULTURE/COLONY COUNT: CPT

## 2021-12-30 PROCEDURE — 80048 BASIC METABOLIC PNL TOTAL CA: CPT

## 2021-12-30 PROCEDURE — 80307 DRUG TEST PRSMV CHEM ANLYZR: CPT

## 2021-12-30 PROCEDURE — 87635 SARS-COV-2 COVID-19 AMP PRB: CPT

## 2021-12-30 PROCEDURE — 82553 CREATINE MB FRACTION: CPT

## 2021-12-30 PROCEDURE — 94002 VENT MGMT INPAT INIT DAY: CPT

## 2021-12-30 PROCEDURE — 83036 HEMOGLOBIN GLYCOSYLATED A1C: CPT

## 2021-12-30 PROCEDURE — 85027 COMPLETE CBC AUTOMATED: CPT

## 2021-12-30 PROCEDURE — 84478 ASSAY OF TRIGLYCERIDES: CPT

## 2021-12-30 PROCEDURE — 71275 CT ANGIOGRAPHY CHEST: CPT | Mod: MA

## 2021-12-30 PROCEDURE — 73120 X-RAY EXAM OF HAND: CPT

## 2021-12-30 PROCEDURE — 82330 ASSAY OF CALCIUM: CPT

## 2021-12-30 PROCEDURE — 87103 BLOOD FUNGUS CULTURE: CPT

## 2021-12-30 PROCEDURE — 87070 CULTURE OTHR SPECIMN AEROBIC: CPT

## 2021-12-30 PROCEDURE — 82962 GLUCOSE BLOOD TEST: CPT

## 2021-12-30 PROCEDURE — 85610 PROTHROMBIN TIME: CPT

## 2021-12-30 PROCEDURE — 84295 ASSAY OF SERUM SODIUM: CPT

## 2021-12-30 PROCEDURE — 84484 ASSAY OF TROPONIN QUANT: CPT

## 2021-12-30 PROCEDURE — 80076 HEPATIC FUNCTION PANEL: CPT

## 2021-12-30 PROCEDURE — 83735 ASSAY OF MAGNESIUM: CPT

## 2021-12-30 PROCEDURE — 74177 CT ABD & PELVIS W/CONTRAST: CPT | Mod: MA

## 2021-12-30 PROCEDURE — 85025 COMPLETE CBC W/AUTO DIFF WBC: CPT

## 2021-12-30 PROCEDURE — 72125 CT NECK SPINE W/O DYE: CPT | Mod: MD

## 2021-12-30 PROCEDURE — 84100 ASSAY OF PHOSPHORUS: CPT

## 2021-12-30 PROCEDURE — 84132 ASSAY OF SERUM POTASSIUM: CPT

## 2021-12-30 PROCEDURE — 80202 ASSAY OF VANCOMYCIN: CPT

## 2021-12-30 PROCEDURE — 87106 FUNGI IDENTIFICATION YEAST: CPT

## 2021-12-30 PROCEDURE — 96374 THER/PROPH/DIAG INJ IV PUSH: CPT

## 2021-12-30 PROCEDURE — 99285 EMERGENCY DEPT VISIT HI MDM: CPT | Mod: 25

## 2021-12-30 PROCEDURE — 87040 BLOOD CULTURE FOR BACTERIA: CPT

## 2021-12-30 PROCEDURE — 83690 ASSAY OF LIPASE: CPT

## 2021-12-30 PROCEDURE — 36415 COLL VENOUS BLD VENIPUNCTURE: CPT

## 2021-12-30 PROCEDURE — 82803 BLOOD GASES ANY COMBINATION: CPT

## 2021-12-30 PROCEDURE — 81001 URINALYSIS AUTO W/SCOPE: CPT

## 2022-01-08 LAB
CULTURE RESULTS: SIGNIFICANT CHANGE UP
SPECIMEN SOURCE: SIGNIFICANT CHANGE UP

## 2022-09-22 NOTE — ED ADULT TRIAGE NOTE - BEFAST LAST WELL KNOWN
Phone call to patient to discuss procedure reschedule. Left message with wife, will await return call.    Unknown

## 2022-10-12 LAB
CULTURE RESULTS: SIGNIFICANT CHANGE UP
SPECIMEN SOURCE: SIGNIFICANT CHANGE UP

## 2023-02-08 NOTE — PROGRESS NOTE ADULT - PROBLEM/PLAN-3
DISPLAY PLAN FREE TEXT
Acute encephalopathy

## 2024-12-06 NOTE — PROGRESS NOTE ADULT - CRITICAL CARE SERVICES PROVIDED
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  - R-ISS stage I (beta 2 microglobulin 1.9, albumin 3.5, , normal karyotype and no high-risk mutations on fish panel  - PET-CT ( 4/10/2023) - showed extensive lytic lesions in the axial skeleton and mild L1 compression deformity.  - Started with Induction chemotherapy with VRD regimen (Velcde/Revlimid/Decadron) - 05/10/2023   - Started Aspirin daily p.o for thrombosis prophylaxis; Acyclovir 400 mg p.o BID for herpes Zoster prophylaxis .  __________________________________________________  --S/p Kyphoplasty 06/08/23    Pt seen by transplant team BMT.  However, she has declined transplant at this time   Repeat PET-CT on 1/18/24 with significantly decreased FDG uptake associated with the previously identified lesions and no new FDG avid lesions   BM Biopsy 02/13/2024 shows significant improvement, with the marrow now showing only 5% plasma cells ( was 60% at baseline)    Labs reviewed  Will resend phosnak for low phosphorus   Repeat phos in one week   Ok to proceed darzalex/zometa today   Pt notes currently out of revlimid--pt notes contacted pharmacy and authorization needed--will contact to get resolved    RTC in 4 weeks with repeat labs for Darzalex      
Sent a fax to [phone] with paperwork from patient's insurance company.    Please review the information and complete sections 21 and 22. If any additional information is necessary use section 32.    When finished fax to patient’s pharmacy @ [phone] and update this encounter. Thank you.  
Critical care services provided